# Patient Record
Sex: FEMALE | Race: WHITE | NOT HISPANIC OR LATINO | Employment: OTHER | URBAN - METROPOLITAN AREA
[De-identification: names, ages, dates, MRNs, and addresses within clinical notes are randomized per-mention and may not be internally consistent; named-entity substitution may affect disease eponyms.]

---

## 2021-04-08 DIAGNOSIS — Z23 ENCOUNTER FOR IMMUNIZATION: ICD-10-CM

## 2024-05-15 ENCOUNTER — HOSPITAL ENCOUNTER (INPATIENT)
Dept: RADIOLOGY | Facility: HOSPITAL | Age: 74
LOS: 2 days | Discharge: HOME | DRG: 024 | End: 2024-05-17
Attending: RADIOLOGY | Admitting: EMERGENCY MEDICINE
Payer: COMMERCIAL

## 2024-05-15 ENCOUNTER — DOCUMENTATION (OUTPATIENT)
Dept: OTHER | Facility: HOSPITAL | Age: 74
End: 2024-05-15

## 2024-05-15 ENCOUNTER — APPOINTMENT (OUTPATIENT)
Dept: RADIOLOGY | Facility: HOSPITAL | Age: 74
DRG: 024 | End: 2024-05-15
Payer: COMMERCIAL

## 2024-05-15 ENCOUNTER — ANESTHESIA (OUTPATIENT)
Dept: RADIOLOGY | Facility: HOSPITAL | Age: 74
DRG: 024 | End: 2024-05-15
Payer: COMMERCIAL

## 2024-05-15 ENCOUNTER — HOSPITAL ENCOUNTER (EMERGENCY)
Facility: HOSPITAL | Age: 74
End: 2024-05-15
Attending: STUDENT IN AN ORGANIZED HEALTH CARE EDUCATION/TRAINING PROGRAM
Payer: COMMERCIAL

## 2024-05-15 ENCOUNTER — ANESTHESIA EVENT (OUTPATIENT)
Dept: RADIOLOGY | Facility: HOSPITAL | Age: 74
DRG: 024 | End: 2024-05-15
Payer: COMMERCIAL

## 2024-05-15 ENCOUNTER — APPOINTMENT (EMERGENCY)
Dept: CT IMAGING | Facility: HOSPITAL | Age: 74
End: 2024-05-15
Payer: COMMERCIAL

## 2024-05-15 VITALS
DIASTOLIC BLOOD PRESSURE: 81 MMHG | HEIGHT: 60 IN | SYSTOLIC BLOOD PRESSURE: 162 MMHG | HEART RATE: 83 BPM | RESPIRATION RATE: 18 BRPM | WEIGHT: 152.56 LBS | TEMPERATURE: 98.1 F | BODY MASS INDEX: 29.95 KG/M2 | OXYGEN SATURATION: 95 %

## 2024-05-15 DIAGNOSIS — I63.511 ACUTE ISCHEMIC RIGHT MCA STROKE (HCC): Primary | ICD-10-CM

## 2024-05-15 DIAGNOSIS — R73.03 PREDIABETES: ICD-10-CM

## 2024-05-15 DIAGNOSIS — I63.9 ACUTE CVA (CEREBROVASCULAR ACCIDENT) (HCC): ICD-10-CM

## 2024-05-15 DIAGNOSIS — N13.30 HYDROURETERONEPHROSIS: ICD-10-CM

## 2024-05-15 DIAGNOSIS — I63.511 ACUTE ISCHEMIC RIGHT MCA STROKE (HCC): ICD-10-CM

## 2024-05-15 DIAGNOSIS — R29.810 FACIAL DROOP: Primary | ICD-10-CM

## 2024-05-15 DIAGNOSIS — I63.411 STROKE DUE TO EMBOLISM OF RIGHT MIDDLE CEREBRAL ARTERY (HCC): ICD-10-CM

## 2024-05-15 LAB
ANION GAP SERPL CALCULATED.3IONS-SCNC: 6 MMOL/L (ref 4–13)
APTT PPP: 28 SECONDS (ref 23–37)
BUN SERPL-MCNC: 23 MG/DL (ref 5–25)
CALCIUM SERPL-MCNC: 8.6 MG/DL (ref 8.4–10.2)
CHLORIDE SERPL-SCNC: 104 MMOL/L (ref 96–108)
CHOLEST SERPL-MCNC: 180 MG/DL
CO2 SERPL-SCNC: 27 MMOL/L (ref 21–32)
CREAT SERPL-MCNC: 1.02 MG/DL (ref 0.6–1.3)
ERYTHROCYTE [DISTWIDTH] IN BLOOD BY AUTOMATED COUNT: 12.3 % (ref 11.6–15.1)
EST. AVERAGE GLUCOSE BLD GHB EST-MCNC: 126 MG/DL
GFR SERPL CREATININE-BSD FRML MDRD: 55 ML/MIN/1.73SQ M
GLUCOSE SERPL-MCNC: 99 MG/DL (ref 65–140)
HBA1C MFR BLD: 6 %
HCT VFR BLD AUTO: 38.1 % (ref 34.8–46.1)
HDLC SERPL-MCNC: 45 MG/DL
HGB BLD-MCNC: 12.7 G/DL (ref 11.5–15.4)
INR PPP: 1.17 (ref 0.84–1.19)
LDLC SERPL CALC-MCNC: 99 MG/DL (ref 0–100)
MCH RBC QN AUTO: 30 PG (ref 26.8–34.3)
MCHC RBC AUTO-ENTMCNC: 33.3 G/DL (ref 31.4–37.4)
MCV RBC AUTO: 90 FL (ref 82–98)
NONHDLC SERPL-MCNC: 135 MG/DL
PLATELET # BLD AUTO: 299 THOUSANDS/UL (ref 149–390)
PMV BLD AUTO: 10 FL (ref 8.9–12.7)
POTASSIUM SERPL-SCNC: 3.8 MMOL/L (ref 3.5–5.3)
PROTHROMBIN TIME: 15.2 SECONDS (ref 11.6–14.5)
RBC # BLD AUTO: 4.24 MILLION/UL (ref 3.81–5.12)
SODIUM SERPL-SCNC: 137 MMOL/L (ref 135–147)
TRIGL SERPL-MCNC: 179 MG/DL
WBC # BLD AUTO: 7.34 THOUSAND/UL (ref 4.31–10.16)

## 2024-05-15 PROCEDURE — 82948 REAGENT STRIP/BLOOD GLUCOSE: CPT

## 2024-05-15 PROCEDURE — 93005 ELECTROCARDIOGRAM TRACING: CPT

## 2024-05-15 PROCEDURE — 99285 EMERGENCY DEPT VISIT HI MDM: CPT

## 2024-05-15 PROCEDURE — 76937 US GUIDE VASCULAR ACCESS: CPT

## 2024-05-15 PROCEDURE — C1769 GUIDE WIRE: HCPCS

## 2024-05-15 PROCEDURE — 36223 PLACE CATH CAROTID/INOM ART: CPT

## 2024-05-15 PROCEDURE — 85027 COMPLETE CBC AUTOMATED: CPT | Performed by: STUDENT IN AN ORGANIZED HEALTH CARE EDUCATION/TRAINING PROGRAM

## 2024-05-15 PROCEDURE — 80048 BASIC METABOLIC PNL TOTAL CA: CPT | Performed by: STUDENT IN AN ORGANIZED HEALTH CARE EDUCATION/TRAINING PROGRAM

## 2024-05-15 PROCEDURE — 85610 PROTHROMBIN TIME: CPT | Performed by: STUDENT IN AN ORGANIZED HEALTH CARE EDUCATION/TRAINING PROGRAM

## 2024-05-15 PROCEDURE — C1887 CATHETER, GUIDING: HCPCS

## 2024-05-15 PROCEDURE — 36224 PLACE CATH CAROTD ART: CPT

## 2024-05-15 PROCEDURE — 36215 PLACE CATHETER IN ARTERY: CPT

## 2024-05-15 PROCEDURE — 99291 CRITICAL CARE FIRST HOUR: CPT | Performed by: PSYCHIATRY & NEUROLOGY

## 2024-05-15 PROCEDURE — C1760 CLOSURE DEV, VASC: HCPCS

## 2024-05-15 PROCEDURE — 70498 CT ANGIOGRAPHY NECK: CPT

## 2024-05-15 PROCEDURE — 36415 COLL VENOUS BLD VENIPUNCTURE: CPT | Performed by: STUDENT IN AN ORGANIZED HEALTH CARE EDUCATION/TRAINING PROGRAM

## 2024-05-15 PROCEDURE — 96365 THER/PROPH/DIAG IV INF INIT: CPT

## 2024-05-15 PROCEDURE — 83036 HEMOGLOBIN GLYCOSYLATED A1C: CPT

## 2024-05-15 PROCEDURE — 85730 THROMBOPLASTIN TIME PARTIAL: CPT | Performed by: STUDENT IN AN ORGANIZED HEALTH CARE EDUCATION/TRAINING PROGRAM

## 2024-05-15 PROCEDURE — 80061 LIPID PANEL: CPT

## 2024-05-15 PROCEDURE — C1757 CATH, THROMBECTOMY/EMBOLECT: HCPCS

## 2024-05-15 PROCEDURE — 70551 MRI BRAIN STEM W/O DYE: CPT

## 2024-05-15 PROCEDURE — 70496 CT ANGIOGRAPHY HEAD: CPT

## 2024-05-15 PROCEDURE — 99291 CRITICAL CARE FIRST HOUR: CPT | Performed by: STUDENT IN AN ORGANIZED HEALTH CARE EDUCATION/TRAINING PROGRAM

## 2024-05-15 PROCEDURE — C1894 INTRO/SHEATH, NON-LASER: HCPCS

## 2024-05-15 PROCEDURE — B31RYZZ FLUOROSCOPY OF INTRACRANIAL ARTERIES USING OTHER CONTRAST: ICD-10-PCS | Performed by: RADIOLOGY

## 2024-05-15 PROCEDURE — 03CG3Z7 EXTIRPATION OF MATTER FROM INTRACRANIAL ARTERY USING STENT RETRIEVER, PERCUTANEOUS APPROACH: ICD-10-PCS | Performed by: RADIOLOGY

## 2024-05-15 PROCEDURE — 99223 1ST HOSP IP/OBS HIGH 75: CPT | Performed by: EMERGENCY MEDICINE

## 2024-05-15 RX ORDER — SODIUM CHLORIDE 9 MG/ML
INJECTION, SOLUTION INTRAVENOUS CONTINUOUS PRN
Status: DISCONTINUED | OUTPATIENT
Start: 2024-05-15 | End: 2024-05-15

## 2024-05-15 RX ORDER — HYDRALAZINE HYDROCHLORIDE 20 MG/ML
5 INJECTION INTRAMUSCULAR; INTRAVENOUS EVERY 6 HOURS PRN
Status: DISCONTINUED | OUTPATIENT
Start: 2024-05-15 | End: 2024-05-17 | Stop reason: HOSPADM

## 2024-05-15 RX ORDER — IODIXANOL 320 MG/ML
200 INJECTION, SOLUTION INTRAVASCULAR
Status: COMPLETED | OUTPATIENT
Start: 2024-05-15 | End: 2024-05-15

## 2024-05-15 RX ORDER — SODIUM CHLORIDE 9 MG/ML
1000 INJECTION, SOLUTION INTRAVENOUS CONTINUOUS
Status: DISCONTINUED | OUTPATIENT
Start: 2024-05-15 | End: 2024-05-15 | Stop reason: HOSPADM

## 2024-05-15 RX ORDER — LABETALOL HYDROCHLORIDE 5 MG/ML
10 INJECTION, SOLUTION INTRAVENOUS EVERY 6 HOURS PRN
Status: DISCONTINUED | OUTPATIENT
Start: 2024-05-15 | End: 2024-05-17 | Stop reason: HOSPADM

## 2024-05-15 RX ORDER — ATORVASTATIN CALCIUM 40 MG/1
40 TABLET, FILM COATED ORAL
Status: DISCONTINUED | OUTPATIENT
Start: 2024-05-15 | End: 2024-05-17 | Stop reason: HOSPADM

## 2024-05-15 RX ORDER — FENTANYL CITRATE 50 UG/ML
INJECTION, SOLUTION INTRAMUSCULAR; INTRAVENOUS AS NEEDED
Status: DISCONTINUED | OUTPATIENT
Start: 2024-05-15 | End: 2024-05-15

## 2024-05-15 RX ORDER — CHLORHEXIDINE GLUCONATE ORAL RINSE 1.2 MG/ML
15 SOLUTION DENTAL EVERY 12 HOURS SCHEDULED
Status: DISCONTINUED | OUTPATIENT
Start: 2024-05-15 | End: 2024-05-16

## 2024-05-15 RX ORDER — FENTANYL CITRATE/PF 50 MCG/ML
SYRINGE (ML) INJECTION
Status: DISCONTINUED
Start: 2024-05-15 | End: 2024-05-16 | Stop reason: WASHOUT

## 2024-05-15 RX ORDER — SODIUM CHLORIDE, SODIUM GLUCONATE, SODIUM ACETATE, POTASSIUM CHLORIDE, MAGNESIUM CHLORIDE, SODIUM PHOSPHATE, DIBASIC, AND POTASSIUM PHOSPHATE .53; .5; .37; .037; .03; .012; .00082 G/100ML; G/100ML; G/100ML; G/100ML; G/100ML; G/100ML; G/100ML
1000 INJECTION, SOLUTION INTRAVENOUS ONCE
Status: COMPLETED | OUTPATIENT
Start: 2024-05-15 | End: 2024-05-15

## 2024-05-15 RX ORDER — ASPIRIN 81 MG/1
81 TABLET, CHEWABLE ORAL DAILY
Status: ON HOLD | COMMUNITY
End: 2024-05-17

## 2024-05-15 RX ADMIN — ATORVASTATIN CALCIUM 40 MG: 40 TABLET, FILM COATED ORAL at 20:09

## 2024-05-15 RX ADMIN — FENTANYL CITRATE 25 MCG: 50 INJECTION INTRAMUSCULAR; INTRAVENOUS at 16:36

## 2024-05-15 RX ADMIN — SODIUM CHLORIDE: 0.9 INJECTION, SOLUTION INTRAVENOUS at 15:14

## 2024-05-15 RX ADMIN — FENTANYL CITRATE 25 MCG: 50 INJECTION INTRAMUSCULAR; INTRAVENOUS at 16:31

## 2024-05-15 RX ADMIN — IODIXANOL 55 ML: 320 INJECTION, SOLUTION INTRAVASCULAR at 17:19

## 2024-05-15 RX ADMIN — SODIUM CHLORIDE, SODIUM GLUCONATE, SODIUM ACETATE, POTASSIUM CHLORIDE, MAGNESIUM CHLORIDE, SODIUM PHOSPHATE, DIBASIC, AND POTASSIUM PHOSPHATE 1000 ML: .53; .5; .37; .037; .03; .012; .00082 INJECTION, SOLUTION INTRAVENOUS at 14:56

## 2024-05-15 RX ADMIN — CHLORHEXIDINE GLUCONATE 15 ML: 1.2 SOLUTION ORAL at 21:17

## 2024-05-15 RX ADMIN — SODIUM CHLORIDE 1000 ML/HR: 0.9 INJECTION, SOLUTION INTRAVENOUS at 15:25

## 2024-05-15 RX ADMIN — PHENYLEPHRINE HYDROCHLORIDE 10 MCG/MIN: 10 INJECTION INTRAVENOUS at 16:37

## 2024-05-15 RX ADMIN — LABETALOL HYDROCHLORIDE 10 MG: 5 INJECTION, SOLUTION INTRAVENOUS at 18:39

## 2024-05-15 RX ADMIN — Medication 17 MG: at 15:00

## 2024-05-15 RX ADMIN — FENTANYL CITRATE 25 MCG: 50 INJECTION INTRAMUSCULAR; INTRAVENOUS at 16:39

## 2024-05-15 RX ADMIN — IOHEXOL 85 ML: 350 INJECTION, SOLUTION INTRAVENOUS at 14:42

## 2024-05-15 RX ADMIN — FENTANYL CITRATE 25 MCG: 50 INJECTION INTRAMUSCULAR; INTRAVENOUS at 16:44

## 2024-05-15 NOTE — PLAN OF CARE
Problem: NEUROSENSORY - ADULT  Goal: Achieves stable or improved neurological status  Description: INTERVENTIONS  - Monitor and report changes in neurological status  - Monitor vital signs such as temperature, blood pressure, glucose, and any other labs ordered   - Initiate measures to prevent increased intracranial pressure  - Monitor for seizure activity and implement precautions if appropriate      Outcome: Progressing     Problem: Neurological Deficit  Goal: Neurological status is stable or improving  Description: Interventions:  - Monitor and assess patient's level of consciousness, motor function, sensory function, and level of assistance needed for ADLs.   - Monitor and report changes from baseline. Collaborate with interdisciplinary team to initiate plan and implement interventions as ordered.   - Provide and maintain a safe environment.  - Consider seizure precautions.  - Consider fall precautions.  - Consider aspiration precautions.  - Consider bleeding precautions.  Outcome: Progressing     Problem: METABOLIC, FLUID AND ELECTROLYTES - ADULT  Goal: Electrolytes maintained within normal limits  Description: INTERVENTIONS:  - Monitor labs and assess patient for signs and symptoms of electrolyte imbalances  - Administer electrolyte replacement as ordered  - Monitor response to electrolyte replacements, including repeat lab results as appropriate  - Instruct patient on fluid and nutrition as appropriate  Outcome: Progressing

## 2024-05-15 NOTE — PLAN OF CARE
Problem: Prexisting or High Potential for Compromised Skin Integrity  Goal: Skin integrity is maintained or improved  Description: INTERVENTIONS:  - Identify patients at risk for skin breakdown  - Assess and monitor skin integrity  - Assess and monitor nutrition and hydration status  - Monitor labs   - Assess for incontinence   - Turn and reposition patient  - Assist with mobility/ambulation  - Relieve pressure over bony prominences  - Avoid friction and shearing  - Provide appropriate hygiene as needed including keeping skin clean and dry  - Evaluate need for skin moisturizer/barrier cream  - Collaborate with interdisciplinary team   - Patient/family teaching  - Consider wound care consult   Outcome: Progressing     Problem: NEUROSENSORY - ADULT  Goal: Achieves stable or improved neurological status  Description: INTERVENTIONS  - Monitor and report changes in neurological status  - Monitor vital signs such as temperature, blood pressure, glucose, and any other labs ordered   - Initiate measures to prevent increased intracranial pressure  - Monitor for seizure activity and implement precautions if appropriate      Outcome: Progressing  Goal: Remains free of injury related to seizures activity  Description: INTERVENTIONS  - Maintain airway, patient safety  and administer oxygen as ordered  - Monitor patient for seizure activity, document and report duration and description of seizure to physician/advanced practitioner  - If seizure occurs,  ensure patient safety during seizure  - Reorient patient post seizure  - Seizure pads on all 4 side rails  - Instruct patient/family to notify RN of any seizure activity including if an aura is experienced  - Instruct patient/family to call for assistance with activity based on nursing assessment  - Administer anti-seizure medications if ordered    Outcome: Progressing  Goal: Achieves maximal functionality and self care  Description: INTERVENTIONS  - Monitor swallowing and  airway patency with patient fatigue and changes in neurological status  - Encourage and assist patient to increase activity and self care.   - Encourage visually impaired, hearing impaired and aphasic patients to use assistive/communication devices  Outcome: Progressing     Problem: CARDIOVASCULAR - ADULT  Goal: Maintains optimal cardiac output and hemodynamic stability  Description: INTERVENTIONS:  - Monitor I/O, vital signs and rhythm  - Monitor for S/S and trends of decreased cardiac output  - Administer and titrate ordered vasoactive medications to optimize hemodynamic stability  - Assess quality of pulses, skin color and temperature  - Assess for signs of decreased coronary artery perfusion  - Instruct patient to report change in severity of symptoms  Outcome: Progressing  Goal: Absence of cardiac dysrhythmias or at baseline rhythm  Description: INTERVENTIONS:  - Continuous cardiac monitoring, vital signs, obtain 12 lead EKG if ordered  - Administer antiarrhythmic and heart rate control medications as ordered  - Monitor electrolytes and administer replacement therapy as ordered  Outcome: Progressing     Problem: RESPIRATORY - ADULT  Goal: Achieves optimal ventilation and oxygenation  Description: INTERVENTIONS:  - Assess for changes in respiratory status  - Assess for changes in mentation and behavior  - Position to facilitate oxygenation and minimize respiratory effort  - Oxygen administered by appropriate delivery if ordered  - Initiate smoking cessation education as indicated  - Encourage broncho-pulmonary hygiene including cough, deep breathe, Incentive Spirometry  - Assess the need for suctioning and aspirate as needed  - Assess and instruct to report SOB or any respiratory difficulty  - Respiratory Therapy support as indicated  Outcome: Progressing     Problem: GASTROINTESTINAL - ADULT  Goal: Minimal or absence of nausea and/or vomiting  Description: INTERVENTIONS:  - Administer IV fluids if ordered to  ensure adequate hydration  - Maintain NPO status until nausea and vomiting are resolved  - Nasogastric tube if ordered  - Administer ordered antiemetic medications as needed  - Provide nonpharmacologic comfort measures as appropriate  - Advance diet as tolerated, if ordered  - Consider nutrition services referral to assist patient with adequate nutrition and appropriate food choices  Outcome: Progressing  Goal: Maintains or returns to baseline bowel function  Description: INTERVENTIONS:  - Assess bowel function  - Encourage oral fluids to ensure adequate hydration  - Administer IV fluids if ordered to ensure adequate hydration  - Administer ordered medications as needed  - Encourage mobilization and activity  - Consider nutritional services referral to assist patient with adequate nutrition and appropriate food choices  Outcome: Progressing  Goal: Maintains adequate nutritional intake  Description: INTERVENTIONS:  - Monitor percentage of each meal consumed  - Identify factors contributing to decreased intake, treat as appropriate  - Assist with meals as needed  - Monitor I&O, weight, and lab values if indicated  - Obtain nutrition services referral as needed  Outcome: Progressing  Goal: Establish and maintain optimal ostomy function  Description: INTERVENTIONS:  - Assess bowel function  - Encourage oral fluids to ensure adequate hydration  - Administer IV fluids if ordered to ensure adequate hydration   - Administer ordered medications as needed  - Encourage mobilization and activity  - Nutrition services referral to assist patient with appropriate food choices  - Assess stoma site  - Consider wound care consult   Outcome: Progressing  Goal: Oral mucous membranes remain intact  Description: INTERVENTIONS  - Assess oral mucosa and hygiene practices  - Implement preventative oral hygiene regimen  - Implement oral medicated treatments as ordered  - Initiate Nutrition services referral as needed  Outcome: Progressing      Problem: GENITOURINARY - ADULT  Goal: Maintains or returns to baseline urinary function  Description: INTERVENTIONS:  - Assess urinary function  - Encourage oral fluids to ensure adequate hydration if ordered  - Administer IV fluids as ordered to ensure adequate hydration  - Administer ordered medications as needed  - Offer frequent toileting  - Follow urinary retention protocol if ordered  Outcome: Progressing  Goal: Absence of urinary retention  Description: INTERVENTIONS:  - Assess patient’s ability to void and empty bladder  - Monitor I/O  - Bladder scan as needed  - Discuss with physician/AP medications to alleviate retention as needed  - Discuss catheterization for long term situations as appropriate  Outcome: Progressing  Goal: Urinary catheter remains patent  Description: INTERVENTIONS:  - Assess patency of urinary catheter  - If patient has a chronic fritz, consider changing catheter if non-functioning  - Follow guidelines for intermittent irrigation of non-functioning urinary catheter  Outcome: Progressing     Problem: METABOLIC, FLUID AND ELECTROLYTES - ADULT  Goal: Electrolytes maintained within normal limits  Description: INTERVENTIONS:  - Monitor labs and assess patient for signs and symptoms of electrolyte imbalances  - Administer electrolyte replacement as ordered  - Monitor response to electrolyte replacements, including repeat lab results as appropriate  - Instruct patient on fluid and nutrition as appropriate  Outcome: Progressing  Goal: Fluid balance maintained  Description: INTERVENTIONS:  - Monitor labs   - Monitor I/O and WT  - Instruct patient on fluid and nutrition as appropriate  - Assess for signs & symptoms of volume excess or deficit  Outcome: Progressing  Goal: Glucose maintained within target range  Description: INTERVENTIONS:  - Monitor Blood Glucose as ordered  - Assess for signs and symptoms of hyperglycemia and hypoglycemia  - Administer ordered medications to maintain glucose  within target range  - Assess nutritional intake and initiate nutrition service referral as needed  Outcome: Progressing     Problem: SKIN/TISSUE INTEGRITY - ADULT  Goal: Skin Integrity remains intact(Skin Breakdown Prevention)  Description: Assess:  -Perform Ramon assessment every shift  -Clean and moisturize skin every 4  -Inspect skin when repositioning, toileting, and assisting with ADLS  -Assess under medical devices such as  every   -Assess extremities for adequate circulation and sensation     Bed Management:  -Have minimal linens on bed & keep smooth, unwrinkled  -Change linens as needed when moist or perspiring  -Avoid sitting or lying in one position for more than 2 hours while in bed  -Keep HOB at 30degrees     Toileting:  -Offer bedside commode  -Assess for incontinence every 2  -Use incontinent care products after each incontinent episode such as     Activity:  -Mobilize patient 3 times a day  -Encourage activity and walks on unit  -Encourage or provide ROM exercises   -Turn and reposition patient every 2 Hours  -Use appropriate equipment to lift or move patient in bed  -Instruct/ Assist with weight shifting every 2 when out of bed in chair  -Consider limitation of chair time 4 hour intervals    Skin Care:  -Avoid use of baby powder, tape, friction and shearing, hot water or constrictive clothing  -Relieve pressure over bony prominences using   -Do not massage red bony areas    Next Steps:  -Teach patient strategies to minimize risks such as    -Consider consults to  interdisciplinary teams such as   Outcome: Progressing  Goal: Incision(s), wounds(s) or drain site(s) healing without S/S of infection  Description: INTERVENTIONS  - Assess and document dressing, incision, wound bed, drain sites and surrounding tissue  - Provide patient and family education  - Perform skin care/dressing changes every   Outcome: Progressing  Goal: Pressure injury heals and does not worsen  Description: Interventions:  -  Implement low air loss mattress or specialty surface (Criteria met)  - Apply silicone foam dressing  - Instruct/assist with weight shifting every  minutes when in chair   - Limit chair time to 4 hour intervals  - Use special pressure reducing interventions such as  when in chair   - Apply fecal or urinary incontinence containment device   - Perform passive or active ROM every   - Turn and reposition patient & offload bony prominences every 2 hours   - Utilize friction reducing device or surface for transfers   - Consider consults to  interdisciplinary teams such as   - Use incontinent care products after each incontinent episode such as   - Consider nutrition services referral as needed  Outcome: Progressing     Problem: HEMATOLOGIC - ADULT  Goal: Maintains hematologic stability  Description: INTERVENTIONS  - Assess for signs and symptoms of bleeding or hemorrhage  - Monitor labs  - Administer supportive blood products/factors as ordered and appropriate  Outcome: Progressing     Problem: MUSCULOSKELETAL - ADULT  Goal: Maintain or return mobility to safest level of function  Description: INTERVENTIONS:  - Assess patient's ability to carry out ADLs; assess patient's baseline for ADL function and identify physical deficits which impact ability to perform ADLs (bathing, care of mouth/teeth, toileting, grooming, dressing, etc.)  - Assess/evaluate cause of self-care deficits   - Assess range of motion  - Assess patient's mobility  - Assess patient's need for assistive devices and provide as appropriate  - Encourage maximum independence but intervene and supervise when necessary  - Involve family in performance of ADLs  - Assess for home care needs following discharge   - Consider OT consult to assist with ADL evaluation and planning for discharge  - Provide patient education as appropriate  Outcome: Progressing  Goal: Maintain proper alignment of affected body part  Description: INTERVENTIONS:  - Support, maintain and  protect limb and body alignment  - Provide patient/ family with appropriate education  Outcome: Progressing     Problem: Neurological Deficit  Goal: Neurological status is stable or improving  Description: Interventions:  - Monitor and assess patient's level of consciousness, motor function, sensory function, and level of assistance needed for ADLs.   - Monitor and report changes from baseline. Collaborate with interdisciplinary team to initiate plan and implement interventions as ordered.   - Provide and maintain a safe environment.  - Consider seizure precautions.  - Consider fall precautions.  - Consider aspiration precautions.  - Consider bleeding precautions.  Outcome: Progressing     Problem: Activity Intolerance/Impaired Mobility  Goal: Mobility/activity is maintained at optimum level for patient  Description: Interventions:  - Assess and monitor patient  barriers to mobility and need for assistive/adaptive devices.  - Assess patient's emotional response to limitations.  - Collaborate with interdisciplinary team and initiate plans and interventions as ordered.  - Encourage independent activity per ability.  - Maintain proper body alignment.  - Perform active/passive rom as tolerated/ordered.  - Plan activities to conserve energy.  - Turn patient as appropriate  Outcome: Progressing     Problem: Communication Impairment  Goal: Ability to express needs and understand communication  Description: Assess patient's communication skills and ability to understand information.  Patient will demonstrate use of effective communication techniques, alternative methods of communication and understanding even if not able to speak.     - Encourage communication and provide alternate methods of communication as needed.  - Collaborate with case management/ for discharge needs.  - Include patient/family/caregiver in decisions related to communication.  Outcome: Progressing     Problem: Potential for  Aspiration  Goal: Non-ventilated patient's risk of aspiration is minimized  Description: Assess and monitor vital signs, respiratory status, and labs (WBC).  Monitor for signs of aspiration (tachypnea, cough, rales, wheezing, cyanosis, fever).    - Assess and monitor patient's ability to swallow.  - Place patient up in chair to eat if possible.  - HOB up at 90 degrees to eat if unable to get patient up into chair.  - Supervise patient during oral intake.   - Instruct patient/ family to take small bites.  - Instruct patient/ family to take small single sips when taking liquids.  - Follow patient-specific strategies generated by speech pathologist.  Outcome: Progressing  Goal: Ventilated patient's risk of aspiration is minimized  Description: Assess and monitor vital signs, respiratory status, airway cuff pressure, and labs (WBC).  Monitor for signs of aspiration (tachypnea, cough, rales, wheezing, cyanosis, fever).    - Elevate head of bed 30 degrees if patient has tube feeding.  - Monitor tube feeding.  Outcome: Progressing     Problem: Nutrition  Goal: Nutrition/Hydration status is improving  Description: Monitor and assess patient's nutrition/hydration status for malnutrition (ex- brittle hair, bruises, dry skin, pale skin and conjunctiva, muscle wasting, smooth red tongue, and disorientation). Collaborate with interdisciplinary team and initiate plan and interventions as ordered.  Monitor patient's weight and dietary intake as ordered or per policy. Utilize nutrition screening tool and intervene per policy. Determine patient's food preferences and provide high-protein, high-caloric foods as appropriate.     - Assist patient with eating.  - Allow adequate time for meals.  - Encourage patient to take dietary supplement as ordered.  - Collaborate with clinical nutritionist.  - Include patient/family/caregiver in decisions related to nutrition.  Outcome: Progressing

## 2024-05-15 NOTE — BRIEF OP NOTE (RAD/CATH)
IR STROKE ALERT Procedure Note    PATIENT NAME: Radha Dodson  : 1950  MRN: [id number]    Pre-op Diagnosis:   1. Stroke due to embolism of right middle cerebral artery (HCC)      Post-op Diagnosis:   1. Stroke due to embolism of right middle cerebral artery (HCC)        Surgeon:   Manjit An MD  Assistants:     No qualified resident was available, Resident is only observing    Estimated Blood Loss: 50 cc  Findings:   Distal right MCA M1 occlusion, TICI 0.  Successful mechanical thrombectomy, TICI 3    Right femoral sheath removed, closure device deployed.    Specimens: None    Complications: None    Anesthesia: MAC sedation    Manjit An MD     Date: 5/15/2024  Time: 5:14 PM

## 2024-05-15 NOTE — ED PROVIDER NOTES
History  Chief Complaint   Patient presents with    STROKE Alert     Sudden onset of slurred speech, left facial droop, left arm weakness while eating lunch at 1340.       History provided by:  Patient and EMS personnel    73-year-old female.  Presents with left-sided facial droop. Patient states that she was eating lunch approximately 50 minutes PTA, when she developed left facial droop, LUE weakness. Denies head injury. EMS found the patient to have left sided facial droop and flaccid left upper extremity. Bilateral lower extremity strength. Normal blood glucose. Stroke alert called.     Prior to Admission Medications   Prescriptions Last Dose Informant Patient Reported? Taking?   aspirin 81 mg chewable tablet 5/15/2024  Yes Yes   Sig: Chew 81 mg daily      Facility-Administered Medications: None       History reviewed. No pertinent past medical history.    History reviewed. No pertinent surgical history.    History reviewed. No pertinent family history.  I have reviewed and agree with the history as documented.    E-Cigarette/Vaping    E-Cigarette Use Never User      E-Cigarette/Vaping Substances     Social History     Tobacco Use    Smoking status: Never    Smokeless tobacco: Never   Vaping Use    Vaping status: Never Used   Substance Use Topics    Alcohol use: Not Currently    Drug use: Never     Review of Systems   Unable to perform ROS: Acuity of condition   Neurological:  Positive for facial asymmetry and weakness. Negative for dizziness, speech difficulty, light-headedness, numbness and headaches.   Hematological:  Does not bruise/bleed easily.   Psychiatric/Behavioral:  Negative for confusion and decreased concentration.      Physical Exam  Physical Exam  Vitals and nursing note reviewed.   Constitutional:       General: She is not in acute distress.     Appearance: She is not ill-appearing or toxic-appearing.   HENT:      Head: Normocephalic and atraumatic.      Right Ear: External ear normal.      Left  Ear: External ear normal.   Eyes:      General: No scleral icterus.        Right eye: No discharge.         Left eye: No discharge.      Extraocular Movements: Extraocular movements intact.      Conjunctiva/sclera: Conjunctivae normal.   Cardiovascular:      Rate and Rhythm: Normal rate and regular rhythm.      Pulses: Normal pulses.      Heart sounds: Normal heart sounds. No murmur heard.  Pulmonary:      Effort: Pulmonary effort is normal. No respiratory distress.      Breath sounds: Normal breath sounds. No stridor. No wheezing, rhonchi or rales.   Chest:      Chest wall: No tenderness.   Abdominal:      General: Bowel sounds are normal.      Palpations: Abdomen is soft.      Tenderness: There is no abdominal tenderness. There is no guarding or rebound.   Musculoskeletal:      Right lower leg: No edema.      Left lower leg: No edema.   Skin:     General: Skin is warm and dry.   Neurological:      Mental Status: She is alert and oriented to person, place, and time.      GCS: GCS eye subscore is 4. GCS verbal subscore is 5. GCS motor subscore is 6.      Cranial Nerves: Cranial nerve deficit and facial asymmetry present. No dysarthria.      Motor: No weakness or pronator drift.      Coordination: Coordination normal.   Psychiatric:         Mood and Affect: Mood normal.         Behavior: Behavior normal.         Vital Signs  ED Triage Vitals   Temperature Pulse Respirations Blood Pressure SpO2   05/15/24 1440 05/15/24 1440 05/15/24 1440 05/15/24 1440 05/15/24 1440   98.1 °F (36.7 °C) 93 16 135/97 97 %      Temp Source Heart Rate Source Patient Position - Orthostatic VS BP Location FiO2 (%)   05/15/24 1440 05/15/24 1440 05/15/24 1440 05/15/24 1515 --   Temporal Monitor Lying Right arm       Pain Score       --                  Vitals:    05/15/24 1451 05/15/24 1500 05/15/24 1515 05/15/24 1530   BP: (!) 171/74 163/76 (!) 172/82 162/81   Pulse: 84 83 85 83   Patient Position - Orthostatic VS:  Lying Lying Lying          Visual Acuity  Visual Acuity      Flowsheet Row Most Recent Value   L Pupil Size (mm) 2   R Pupil Size (mm) 2            ED Medications  Medications   sodium chloride 0.9 % infusion (1,000 mL/hr Intravenous New Bag 5/15/24 1525)   iohexol (OMNIPAQUE) 350 MG/ML injection (MULTI-DOSE) 85 mL (85 mL Intravenous Given 5/15/24 1442)   tenecteplase (TNKase) injection 17 mg (17 mg Intravenous Given 5/15/24 1500)   multi-electrolyte (ISOLYTE-S PH 7.4) bolus 1,000 mL (0 mL Intravenous Stopped 5/15/24 1521)       Diagnostic Studies  Results Reviewed       Procedure Component Value Units Date/Time    Basic metabolic panel [[id number]] Collected: 05/15/24 1455    Lab Status: Final result Specimen: Blood from Arm, Left Updated: 05/15/24 1528     Sodium 137 mmol/L      Potassium 3.8 mmol/L      Chloride 104 mmol/L      CO2 27 mmol/L      ANION GAP 6 mmol/L      BUN 23 mg/dL      Creatinine 1.02 mg/dL      Glucose 99 mg/dL      Calcium 8.6 mg/dL      eGFR 55 ml/min/1.73sq m     Narrative:      National Kid[employer] Foundation guidelines for Chronic Kidney Disease (CKD):     Stage 1 with normal or high GFR (GFR > 90 mL/min/1.73 square meters)    Stage 2 Mild CKD (GFR = 60-89 mL/min/1.73 square meters)    Stage 3A Moderate CKD (GFR = 45-59 mL/min/1.73 square meters)    Stage 3B Moderate CKD (GFR = 30-44 mL/min/1.73 square meters)    Stage 4 Severe CKD (GFR = 15-29 mL/min/1.73 square meters)    Stage 5 End Stage CKD (GFR <15 mL/min/1.73 square meters)  Note: GFR calculation is accurate only with a steady state creatinine    Protime-INR [[id number]]  (Abnormal) Collected: 05/15/24 1455    Lab Status: Final result Specimen: Blood from Arm, Left Updated: 05/15/24 1524     Protime 15.2 seconds      INR 1.17    APTT [[id number]]  (Normal) Collected: 05/15/24 1455    Lab Status: Final result Specimen: Blood from Arm, Left Updated: 05/15/24 1524     PTT 28 seconds     CBC and Platelet [[id number]]  (Normal) Collected: 05/15/24 0674     Lab Status: Final result Specimen: Blood from Arm, Left Updated: 05/15/24 1507     WBC 7.34 Thousand/uL      RBC 4.24 Million/uL      Hemoglobin 12.7 g/dL      Hematocrit 38.1 %      MCV 90 fL      MCH 30.0 pg      MCHC 33.3 g/dL      RDW 12.3 %      Platelets 299 Thousands/uL      MPV 10.0 fL                    CT stroke alert brain   Final Result by Jose C Wong MD (05/15 0302)      No acute intracranial abnormality.      Mild chronic microangiopathy.      Findings were directly discussed with Aram Bautista at approximately 2:48 PM.      Workstation performed: [id number]         CTA stroke alert (head/neck)   Final Result by Jose C Wong MD (05/15 1500)      Occlusive thrombus in right MCA distal M1 bifurcation extending into proximal M2 superior and inferior divisions. Contrast opacification is noted beyond this occluded segment likely due to good collaterals.      Severe stenosis in right PCA P2 segment.      Additional chronic/incidental findings as detailed above.      Findings were directly discussed with Aram Bautista at approximately 2:48 PM.                        Workstation performed: [id number]         CT head wo contrast    (Results Pending)          Procedures  ECG 12 Lead Documentation Only    Date/Time: 5/15/2024 2:50 PM    Performed by: Gaston Zeng DO  Authorized by: Gaston Zeng DO    Indications / Diagnosis:  Stroke alert  Patient location:  ED  Previous ECG:     Previous ECG:  Unavailable  Interpretation:     Interpretation: non-specific    Rate:     ECG rate:  86    ECG rate assessment: normal    Rhythm:     Rhythm: sinus rhythm    QRS:     QRS axis:  Normal    QRS intervals:  Normal  Conduction:     Conduction: abnormal      Abnormal conduction comment:  Short AR interval  ST segments:     ST segments:  Normal  T waves:     T waves: normal    CriticalCare Time    Date/Time: 5/15/2024 4:03 PM    Performed by: Gaston Zeng DO  Authorized by: Gaston Zeng DO     Critical care provider statement:     Critical care time (minutes):  40    Critical care time was exclusive of:  Separately billable procedures and treating other patients    Critical care was necessary to treat or prevent imminent or life-threatening deterioration of the following conditions:  CNS failure or compromise    Critical care was time spent personally by me on the following activities:  Blood draw for specimens, obtaining history from patient or surrogate, development of treatment plan with patient or surrogate, discussions with consultants, evaluation of patient's response to treatment, examination of patient, review of old charts, re-evaluation of patient's condition, ordering and review of radiographic studies, ordering and review of laboratory studies and ordering and performing treatments and interventions    ED Course  ED Course as of 05/15/24 1603   Wed May 15, 2024   1439 Vital signs reviewed.  Stroke alert called.  On exam, the patient is awake/alert/oriented with left-sided facial droop.  No signs of upper extremity weakness.  No pronator drift.  Neurology, Dr. Bautista, contacted.   1455 Following CAT scan, the patient appears less alert.  The patient continues to have left-sided facial droop and now has left upper extremity pronator drift.  Neurology contacted.  Will administer TNK.  Neurology is contacting interventional radiology.    1456 Per neurology, the patient has a right M1 occlusion distally with patent vessels beyond that point.  Recommendations include TNK, raising BP to 160-180 ideally, keeping the head of bed completely flat.    1504 CTA imaging +Occlusive thrombus in right MCA distal M1 bifurcation extending into proximal M2 superior and inferior divisions. Contrast opacification is noted beyond this occluded segment likely due to good collaterals.     Severe stenosis in right PCA P2 segment.     1517 Patient is s/p TNK. Last /76. Patient signed EMTALA paperwork. [facility] is  enroute. ETA 20 minutes.      Medical Decision Making  This patient presents with left sided facial droop.   Diagnostic considerations include TIA, ischemic CVA, hemorrhagic CVA, Bell's palsy, intracranial malignancy. See ED Course.         Problems Addressed:  Acute CVA (cerebrovascular accident) (HCC): acute illness or injury  Acute ischemic right MCA stroke (HCC): acute illness or injury  Facial droop: acute illness or injury    Amount and/or Complexity of Data Reviewed  Labs: ordered. Decision-making details documented in ED Course.  Radiology: ordered. Decision-making details documented in ED Course.  ECG/medicine tests: ordered and independent interpretation performed. Decision-making details documented in ED Course.  Discussion of management or test interpretation with external provider(s): The case and treatment plan were discussed with neurology    Risk  Prescription drug management.  Decision regarding hospitalization.  Emergency major surgery.             Disposition  Final diagnoses:   Facial droop   Acute CVA (cerebrovascular accident) (HCC)   Acute ischemic right MCA stroke (HCC)     Time reflects when diagnosis was documented in both MDM as applicable and the Disposition within this note       Time User Action Codes Description Comment    5/15/2024  2:39 PM Gaston Zeng Add [R29.810] Facial droop     5/15/2024  3:03 PM Gaston Zeng Add [I63.9] Acute CVA (cerebrovascular accident) (HCC)     5/15/2024  3:03 PM Gaston Zeng Add [I63.511] Acute ischemic right MCA stroke (HCC)           ED Disposition       ED Disposition   Transfer to Another Facility-In Network    Condition   --    Date/Time   Wed May 15, 2024  3:04 PM    Comment   Radha Dodson should be transferred out to [facility].               MD Documentation      Flowsheet Row Most Recent Value   Patient Condition The patient has been stabilized such that within reasonable medical probability, no material deterioration of the patient condition  or the condition of the unborn child(abhijit) is likely to result from the transfer   Reason for Transfer Level of Care needed not available at this facility   Benefits of Transfer Specialized equipment and/or services available at the receiving facility (Include comment)________________________   Risks of Transfer Potential for delay in receiving treatment   Accepting Physician Lily   Accepting Facility Name, [facility]   Sending MD Zeng          RN Documentation      Flowsheet Row Most Recent Value   Accepting Facility Name, [facility]   Transport Mode Helicopter   Level of Care CCT-Nurse          Follow-up Information    None         Discharge Medication List as of 5/15/2024  3:49 PM        CONTINUE these medications which have NOT CHANGED    Details   aspirin 81 mg chewable tablet Chew 81 mg daily, Historical Med             No discharge procedures on file.    PDMP Review       None            ED Provider  Electronically Signed by             Gaston Zeng DO  05/15/24 7879

## 2024-05-15 NOTE — QUICK NOTE
Radha Cote is a 73-year-old female with no known significant past medical history who presented as a stroke alert with left facial droop and left upper extremity weakness which occurred at 1:30 PM today suddenly while she was having lunch.  According to the H&P, EMS reported that patient initially had flaccid paralysis of the left upper extremity however this had resolved by the time she presented to the ED.  NIHSS 2 in the ED. CTH negative. CTA with distal R M1 occlusion w/ distal flow in M2 branches; severe stenosis of R P2.   Post-CT patient worsened with AMS, LUE and LLE weakness, and thus TNK was administered and patient was transferred to [facility] for evaluation for potential mechanical thrombectomy.    On arrival to [facility], patient went directly to IR. NIHSS as follows:    NIHSS:  1a.Level of Consciousness: 0 = Alert   1b. LOC Questions: 0 = Answers both correctly   1c. LOC Commands: 0 = Obeys both correctly   2. Best Gaze: 0 = Normal   3. Visual: 0 = No visual field loss   4. Facial Palsy: 1=Minor paralysis (flattened nasolabial fold, asymmetric on smiling)   5a. Motor Right Arm: 0=No drift, limb holds 90 (or 45) degrees for full 10 seconds   5b. Motor Left Arm: 0=No drift, limb holds 90 (or 45) degrees for full 10 seconds   6a. Motor Right Le=No drift, limb holds 90 (or 45) degrees for full 10 seconds   6b. Motor Left Le=No drift, limb holds 90 (or 45) degrees for full 10 seconds   7. Limb Ataxia:  0=Absent   8. Sensory: 0=Normal; no sensory loss   9. Best Language:  0=No aphasia, normal   10. Dysarthria: 0=Normal articulation   11. Extinction and Inattention (formerly Neglect): 1=Visual, tactile, auditory, spatial or personal inattention or extinction to bilateral simultaneous stimulation in one of the sensory modalities   Total Score: 2      Additionally, patient noted to have minimal L sided weakness, though no drift.

## 2024-05-15 NOTE — CONSULTS
Consultation - Neurosurgery   Radha Dodson 73 y.o. female MRN: [id number]  Unit/Bed#: ICU OVR Encounter: [id number]      Assessment & Plan     Assessment:  Right MCA occlusion    Plan:  Patient with improved symptoms however not at baseline.  I recommended cerebral angiography with mechanical thrombectomy.  She understands the risks and has elected proceed.    History of Present Illness     HPI: Radha Dodson is a 73 y.o. year old female who presents with acute right MCA syndrome. Last known normal was 1:30 PM.   NIH has been fluctuating.  CTA with distal right MCA M1 occlusion.  She received TNK.    Consults    Review of Systems   Unable to perform ROS: Acuity of condition       Historical Information   No past medical history on file.  No past surgical history on file.  Social History     Substance and Sexual Activity   Alcohol Use Not Currently     Social History     Substance and Sexual Activity   Drug Use Never     E-Cigarette/Vaping    E-Cigarette Use Never User      E-Cigarette/Vaping Substances     Social History     Tobacco Use   Smoking Status Never   Smokeless Tobacco Never     No family history on file.    Meds/Allergies   all current active meds have been reviewed  No Known Allergies    Objective     Intake/Output Summary (Last 24 hours) at 5/15/2024 1703  Last data filed at 5/15/2024 1655  Gross per 24 hour   Intake --   Output 250 ml   Net -250 ml       Physical Exam  Constitutional:       Appearance: Normal appearance.   Eyes:      Pupils: Pupils are equal, round, and reactive to light.   Cardiovascular:      Rate and Rhythm: Normal rate.      Pulses: Normal pulses.   Pulmonary:      Effort: Pulmonary effort is normal.   Musculoskeletal:         General: Normal range of motion.   Skin:     General: Skin is warm.   Neurological:      Mental Status: She is alert.      Cranial Nerves: Cranial nerve deficit present.      Sensory: Sensory deficit present.      Motor: Weakness present.      Comments: Mild  left facial droop  Left side with very subtle weakness relative to the right but no drift.  Mild neglect and  Positive extension.   Psychiatric:         Mood and Affect: Mood normal.         Behavior: Behavior normal.         Thought Content: Thought content normal.         Judgment: Judgment normal.       Neurologic Exam     Cranial Nerves     CN III, IV, VI   Pupils are equal, round, and reactive to light.      Vitals:There were no vitals taken for this visit.,There is no height or weight on file to calculate BMI.     Lab Results: I have personally reviewed pertinent results.      Imaging Studies: I have personally reviewed pertinent reports.      EKG, Pathology, and Other Studies: I have personally reviewed pertinent reports.      VTE Prophylaxis: Sequential compression device (Venodyne)     Code Status: No Order  Advance Directive and Living Will:      Power of :    POLST:      Counseling / Coordination of Care  None

## 2024-05-15 NOTE — STROKE DOCUMENTATION
Patient to be transferred to [facility] IR via [facility]. Accepting provider Dr. An, number for report [phone]. ETA of transportation [phone].

## 2024-05-15 NOTE — ED NOTES
Report given to [facility]. Pt transported to [facility] via [facility]. No s/s of distress, VS stable, A&Ox4, ID band in place     Nancy Graham RN  05/15/24 1825

## 2024-05-15 NOTE — DISCHARGE INSTRUCTIONS
AFTER YOU LEAVE:     Self-care:   Keep your wound clean and dry.  Remove band aid/ dressing tomorrow. You may shower 24 hours after your procedure. Shower and wash groin area or wrist area gently with soap and water: beginning tomorrow. Rinse and pat Dry. Apply new water seal band aid. Repeat this process for 5 days.  If there is any drainage from the puncture site, you should put on a clean bandage. No Powders, creams, lotions or antibiotic ointments for 5 days.  No tub baths, hot tubs or swimming for 5 days.    Watch for bleeding and bruising: It is normal to have a bruise and soreness where the angiogram catheter went in.  Diet:   You may resume your regular diet, Sips of flat soda will help with mild nausea.  Drink more liquids than usual for the next 24 hours        IMMEDIATELY Contact Interventional Radiology at [phone] if any of the following occur:  If your bruise gets larger or if you notice any active bleeding. APPLY DIRECT PRESSURE TO THE BLEEDING SITE.   If you notice increased swelling or have increased pain at the puncture site   If you have any numbness or pain in the extremity of the puncture site   If that extremity seems cold or pale.    You have fever greater than 101  Persistent nausea or vomitting    Follow up with your primary healthcare provider  as directed: Write down your questions so you remember to ask them during your visits.

## 2024-05-15 NOTE — H&P
[facility]  H&P: Critical Care  Name: Radha Dodson 73 y.o. female I MRN: [id number]  Unit/Bed#: ICU 14 I Date of Admission: 5/15/2024   Date of Service: 5/15/2024 I Hospital Day: 0      Assessment & Plan   Neuro:     Diagnosis: Acute R MCA CVA s/p thrombectomy 5/15  NIH 2  TNK 5/15 1500  CTA-Occlusive thrombus in right MCA distal M1 bifurcation extending into proximal M2 superior and inferior divisions. Contrast opacification is noted beyond this occluded segment likely due to good collaterals. Severe stenosis in right PCA P2 segment.    MRI P  Repeat CTH 24h post TNK  A1C P  /179/45/135  ECHO P  Plan:   Neuro/Neurosx consulted  Stroke Pathway  Neuro checks stat CTH for any change GCS> 2 ps  Neurovasc checks/groin per protocol  Statin  ASA/DVT ppx once cleared by neurosx  -140    CV:   No active issues    Pulm:  No active issues    GI:   No active issues    :   No active issues    F/E/N:    No active issues    Heme/Onc:   No active issues    Endo:   No active issues    ID:   No active issues    MSK/Skin:   No active issues    Disposition: Critical care       History of Present Illness     HPI: Radha Dodson is a 73 y.o. with no PMH (does not see physicians) who presents with L facial droop/UE weakness to [facility]. Stroke alert initiated. R MCA occulsion noted, TNK given, Endovascular alert. Now s/p thrombectomy with no neuro deficits.    History obtained from sibling, chart review, and the patient.  Review of Systems   Constitutional: Negative.    Respiratory: Negative.     Cardiovascular: Negative.    Gastrointestinal: Negative.    Genitourinary: Negative.    Musculoskeletal: Negative.    Neurological: Negative.    All other systems reviewed and are negative.     Historical Information   No past medical history on file. No past surgical history on file.   Current Outpatient Medications   Medication Instructions    aspirin 81 mg, Oral, Daily    No Known Allergies    Social History     Tobacco Use    Smoking status: Never    Smokeless tobacco: Never   Vaping Use    Vaping status: Never Used   Substance Use Topics    Alcohol use: Not Currently    Drug use: Never    No family history on file.       Objective                            Vitals I/O      Most Recent Min/Max in 24hrs   Temp 98.5 °F (36.9 °C) Temp  Min: 98.1 °F (36.7 °C)  Max: 98.5 °F (36.9 °C)   Pulse 70 Pulse  Min: 70  Max: 93   Resp 18 Resp  Min: 12  Max: 18   /78 BP  Min: 125/68  Max: 172/82   O2 Sat 95 % SpO2  Min: 94 %  Max: 98 %      Intake/Output Summary (Last 24 hours) at 5/15/2024 1857  Last data filed at 5/15/2024 1721  Gross per 24 hour   Intake 800 ml   Output 250 ml   Net 550 ml       Diet Regular; Regular House    Invasive Monitoring           Physical Exam   Physical Exam  Eyes:      Pupils: Pupils are equal, round, and reactive to light.   Skin:     General: Skin is warm and dry.   HENT:      Head: Normocephalic and atraumatic.      Mouth/Throat:      Mouth: Mucous membranes are moist.   Cardiovascular:      Rate and Rhythm: Normal rate and regular rhythm.      Pulses: Normal pulses.      Heart sounds: Normal heart sounds.   Musculoskeletal:         General: No swelling. Normal range of motion.      Right lower leg: No edema.      Left lower leg: No edema.   Abdominal: General: Bowel sounds are normal. There is no distension.      Palpations: Abdomen is soft.      Tenderness: There is no abdominal tenderness.   Constitutional:       General: She is not in acute distress.  Pulmonary:      Effort: Pulmonary effort is normal. No respiratory distress.      Breath sounds: Normal breath sounds.   Neurological:      General: No focal deficit present.      Mental Status: She is alert and oriented to person, place and time.      GCS: GCS eye subscore is 4. GCS verbal subscore is 5. GCS motor subscore is 6.      Motor: Strength full and intact in all extremities. No motor deficit.            Diagnostic  Studies      EKG: tel  Imaging:  I have personally reviewed pertinent reports.   and I have personally reviewed pertinent films in PACS     Medications:  Scheduled PRN   chlorhexidine, 15 mL, Q12H CAMPOS      hydrALAZINE, 5 mg, Q6H PRN  labetalol, 10 mg, Q6H PRN       Continuous          Labs:    CBC    Recent Labs     05/15/24  1455   WBC 7.34   HGB 12.7   HCT 38.1        BMP    Recent Labs     05/15/24  1455   SODIUM 137   K 3.8      CO2 27   AGAP 6   BUN 23   CREATININE 1.02   CALCIUM 8.6       Coags    Recent Labs     05/15/24  1455   INR 1.17   PTT 28        Additional Electrolytes  No recent results       Blood Gas    No recent results  No recent results LFTs  No recent results    Infectious  No recent results  Glucose  Recent Labs     05/15/24  1455   GLUC 99               ERAN River

## 2024-05-15 NOTE — ANESTHESIA POSTPROCEDURE EVALUATION
Post-Op Assessment Note    CV Status:  Stable    Pain management: adequate       Mental Status:  Alert and awake   Hydration Status:  Stable   PONV Controlled:  Controlled   Airway Patency:  Patent     Post Op Vitals Reviewed: Yes    No anethesia notable event occurred.    Staff: CRNA   Comments: pt transported on monitor, VSS, awake and talking through out transport            BP   151/54   Temp      Pulse  80   Resp   18   SpO2   94 room air

## 2024-05-15 NOTE — TELEMEDICINE
TeleConsultation - Stroke   Radha Dodson 73 y.o. female MRN: [id number]  Unit/Bed#: ED 01 Encounter: [id number]      VIRTUAL CARE DOCUMENTATION:     1. This service was provided via Telemedicine using [employer]     2. Parties in the room with patient during teleconsult Patient only    3. Confidentiality My office door was closed     4. Participants No one else was in the room    5. Patient acknowledged consent and understanding of privacy and security of the  Telemedicine consult. I informed the patient that I have reviewed their record in Epic and presented the opportunity for them to ask any questions regarding the visit today.  The patient agreed to participate.    6. Time spent 45 minutes.       Assessment & Plan   Assessment:   72 y/o F with no known PMHx that presents with L facial droop and fluctuating L hemiparesis, now again improved s/p TNK administration, in the setting of R distal M1 occlusion w/ distal flow and severe R P2 stenosis.    TPA Decision: After a discussion of risks, benefits and alternatives reviewing inclusion and exclusion criteria the decision was made to proceed with thrombolytic therapy. Specifically discussed were the potential benefits, risks, and side effects of the proposed stroke intervention(s) and care; the likelihood of the patient achieving their goals; and any potential problems that might occur as a result of the intervention(s); reasonable alternatives to the proposed stroke intervention(s) and care. The discussion encompasses risks, benefits and side effects related to the alternatives and the risks related to not receiving the proposed stroke intervention(s) and care. Verbal consent was obtained from the patient..  Consent was obtained byGaston Zeng DO.    Plan:   -continue neurochecks; notify with changes  -admit to ICU at \[facility]\""  -HCT reviewed - no evidence of acute ischemia/hemorrhage  -CTA reviewed - R distal M1 stenosis w/ distal flow; severe R P2  stenosis  -obtain MRI brain w/o contrast  -obtain TTE  -telemetry  -24 hr CT - this can be deferred if MRI is done around this time  -if 24 hr scan is unremarkable can initiate ASA and DVT ppx  -initiate Atorvastatin 40mg daily  -BP goal <180/105 post-TNK  -maintain HOB flat; provide fluids with SBP goal ideally 160-180 unless thrombectomy is performed and successful  -rest of care as per NCC/Neuro IR at [facility]    Recommendations for outpatient neurological follow up have yet to be determined.      Reason for Consult / Principal Problem: stroke alert  Hx and PE limited by: N/A  Patient last known well: 1:30pm 5/15  Stroke alert called: 2:32pm, prehospital  Neurology time of arrival: immediate, by phone    HPI: Radha Dodson is a 73 y.o. female without known significant PMHx who presents with onset of L facial droop and LUE weakness. LKN 1:30pm while she was having lunch and suddenly had deficits develop. EMS reported initially had flaccid paralysis of the LUE but by the time of ED arrival had full strength with only L facial droop as residual. NIHSS reported as 2 with no other deficit noted initially. Went for HCT and CTA, which were personally reviewed - no evidence of acute ischemia/hemorrhage; distal R M1 occlusion w/ distal flow in M2 branches; severe stenosis of R P2.    Post-CT, patient had worsened clinically, now with altered mental status and return of considerable weakness in the LUE and LLE. As such, decision was made to proceed with TNK administration. There were no known contraindications. TNK was administered at 3pm. Discussed case with Neuro IR (Dr. An) and pt is to be transferred to [facility] for potential mechanical thrombectomy and monitoring.    Post-TNK pt was evaluated on tele and now again had residual L facial droop but no other deficits noted; NIHSS 2.    Consult to Neurology  Consult performed by: Aram Bautista DO  Consult ordered by: Gaston Zeng DO        Review of Systems   Neurological:   Positive for facial asymmetry and weakness.   All other systems reviewed and are negative.      Historical Information   No past medical history on file.  No past surgical history on file.  Social History   Social History     Substance and Sexual Activity   Alcohol Use Not on file     Social History     Substance and Sexual Activity   Drug Use Not on file     E-Cigarette/Vaping     E-Cigarette/Vaping Substances     Social History     Tobacco Use   Smoking Status Not on file   Smokeless Tobacco Not on file     Family History: History reviewed. No pertinent family history.    Review of previous medical records was completed.    Meds/Allergies   all current active meds have been reviewed, current meds:   Current Facility-Administered Medications   Medication Dose Route Frequency    multi-electrolyte (ISOLYTE-S PH 7.4) bolus 1,000 mL  1,000 mL Intravenous Once   , and PTA meds:   Prior to Admission Medications   Prescriptions Last Dose Informant Patient Reported? Taking?   aspirin 81 mg chewable tablet 5/15/2024  Yes Yes   Sig: Chew 81 mg daily      Facility-Administered Medications: None       Not on File    Objective   Vitals:There were no vitals taken for this visit.,There is no height or weight on file to calculate BMI.  No intake or output data in the 24 hours ending 05/15/24 1444    Invasive Devices:   Invasive Devices       Peripheral Intravenous Line  Duration             Peripheral IV 05/15/24 Dorsal (posterior);Right Hand <1 day    Peripheral IV 05/15/24 Left Antecubital <1 day                    Physical Exam  Constitutional:       Appearance: She is well-developed.   HENT:      Head: Normocephalic and atraumatic.   Eyes:      Extraocular Movements: EOM normal.      Conjunctiva/sclera: Conjunctivae normal.   Pulmonary:      Effort: No respiratory distress.   Abdominal:      General: There is no distension.   Musculoskeletal:         General: No swelling.      Cervical back: No rigidity.   Skin:      Coloration: Skin is not jaundiced.   Neurological:      Mental Status: She is alert and oriented to person, place, and time.      Motor: Motor strength is normal.     Coordination: Finger-Nose-Finger Test and Heel to Shin Test normal.   Psychiatric:         Speech: Speech normal.       Neurologic Exam     Mental Status   Oriented to person, place, and time.   Attention: normal. Concentration: normal.   Speech: speech is normal   Level of consciousness: alert  Knowledge: good.   Able to name object. Able to repeat. Normal comprehension.     Cranial Nerves     CN II   Visual fields full to confrontation.     CN III, IV, VI   Extraocular motions are normal.     CN V   Facial sensation intact.     CN VIII   Hearing: intact    CN XII   Tongue deviation: none  L lower facial droop     Motor Exam     Strength   Strength 5/5 throughout.     Sensory Exam   Light touch normal.     Gait, Coordination, and Reflexes     Coordination   Finger to nose coordination: normal  Heel to shin coordination: normal      NIHSS:  1a.Level of Consciousness: 0 = Alert   1b. LOC Questions: 0 = Answers both correctly   1c. LOC Commands: 0 = Obeys both correctly   2. Best Gaze: 0 = Normal   3. Visual: 0 = No visual field loss   4. Facial Palsy: 2=Partial paralysis (total or near total paralysis of the lower face)   5a. Motor Right Arm: 0=No drift, limb holds 90 (or 45) degrees for full 10 seconds   5b. Motor Left Arm: 0=No drift, limb holds 90 (or 45) degrees for full 10 seconds   6a. Motor Right Le=No drift, limb holds 90 (or 45) degrees for full 10 seconds   6b. Motor Left Le=No drift, limb holds 90 (or 45) degrees for full 10 seconds   7. Limb Ataxia:  0=Absent   8. Sensory: 0=Normal; no sensory loss   9. Best Language:  0=No aphasia, normal   10. Dysarthria: 0=Normal articulation   11. Extinction and Inattention (formerly Neglect): 0=No abnormality   Total Score: 2     Time NIHSS was completed: 3:10pm 5/15    Modified [place] Score:  0  "(No baseline symptoms/disability)    Lab Results: I have personally reviewed pertinent reports.  , CBC:   Results from last 7 days   Lab Units 05/15/24  1455   WBC Thousand/uL 7.34   RBC Million/uL 4.24   HEMOGLOBIN g/dL 12.7   HEMATOCRIT % 38.1   MCV fL 90   PLATELETS Thousands/uL 299   , BMP/CMP:       Invalid input(s): \"ALBUMIN\", HgBA1C:   , Coagulation:   , Lipid Profile:     Imaging Studies: I have personally reviewed pertinent films in PACS with a Radiologist.  EKG, Pathology, and Other Studies: I have personally reviewed pertinent reports.    VTE Prophylaxis: Sequential compression device (Venodyne)     Counseling / Coordination of Care  Total Critical Care time spent 45 minutes excluding procedures, teaching and family updates.      "

## 2024-05-15 NOTE — EMTALA/ACUTE CARE TRANSFER
[facility] EMERGENCY DEPARTMENT  [address] [facility] [address]  Dept: [phone]      EMTALA TRANSFER CONSENT    NAME Radha Dodson                                         1950                              MRN [id number]    I have been informed of my rights regarding examination, treatment, and transfer   by Dr. Gaston Zeng DO    Benefits: Specialized equipment and/or services available at the receiving facility (Include comment)________________________    Risks: Potential for delay in receiving treatment      Consent for Transfer:  I acknowledge that my medical condition has been evaluated and explained to me by the emergency department physician or other qualified medical person and/or my attending physician, who has recommended that I be transferred to the service of  Accepting Physician: Lily at Accepting Facility Name, City & State : [facility]. The above potential benefits of such transfer, the potential risks associated with such transfer, and the probable risks of not being transferred have been explained to me, and I fully understand them.  The doctor has explained that, in my case, the benefits of transfer outweigh the risks.  I agree to be transferred.    I authorize the performance of emergency medical procedures and treatments upon me in both transit and upon arrival at the receiving facility.  Additionally, I authorize the release of any and all medical records to the receiving facility and request they be transported with me, if possible.  I understand that the safest mode of transportation during a medical emergency is an ambulance and that the Hospital advocates the use of this mode of transport. Risks of traveling to the receiving facility by car, including absence of medical control, life sustaining equipment, such as oxygen, and medical personnel has been explained to me and I fully understand them.    (FLAVIA CORRECT BOX BELOW)  [  ]  I consent  to the stated transfer and to be transported by ambulance/helicopter.  [  ]  I consent to the stated transfer, but refuse transportation by ambulance and accept full responsibility for my transportation by car.  I understand the risks of non-ambulance transfers and I exonerate the Hospital and its staff from any deterioration in my condition that results from this refusal.    X___________________________________________    DATE  05/15/24  TIME________  Signature of patient or legally responsible individual signing on patient behalf           RELATIONSHIP TO PATIENT_________________________          Provider Certification    NAME Radha Dodson                                         1950                              MRN [id number]    A medical screening exam was performed on the above named patient.  Based on the examination:    Condition Necessitating Transfer The primary encounter diagnosis was Facial droop. Diagnoses of Acute CVA (cerebrovascular accident) (HCC) and Acute ischemic right MCA stroke (HCC) were also pertinent to this visit.    Patient Condition: The patient has been stabilized such that within reasonable medical probability, no material deterioration of the patient condition or the condition of the unborn child(abhijit) is likely to result from the transfer    Reason for Transfer: Level of Care needed not available at this facility    Transfer Requirements: Facility [facility]   Space available and qualified personnel available for treatment as acknowledged by    Agreed to accept transfer and to provide appropriate medical treatment as acknowledged by       Lily  Appropriate medical records of the examination and treatment of the patient are provided at the time of transfer   STAFF INITIAL WHEN COMPLETED _______  Transfer will be performed by qualified personnel from    and appropriate transfer equipment as required, including the use of necessary and appropriate life support  measures.    Provider Certification: I have examined the patient and explained the following risks and benefits of being transferred/refusing transfer to the patient/family:         Based on these reasonable risks and benefits to the patient and/or the unborn child(abhijit), and based upon the information available at the time of the patient’s examination, I certify that the medical benefits reasonably to be expected from the provision of appropriate medical treatments at another medical facility outweigh the increasing risks, if any, to the individual’s medical condition, and in the case of labor to the unborn child, from effecting the transfer.    X____________________________________________ DATE 05/15/24        TIME_______      ORIGINAL - SEND TO MEDICAL RECORDS   COPY - SEND WITH PATIENT DURING TRANSFER

## 2024-05-16 ENCOUNTER — APPOINTMENT (INPATIENT)
Dept: RADIOLOGY | Facility: HOSPITAL | Age: 74
DRG: 024 | End: 2024-05-16
Payer: COMMERCIAL

## 2024-05-16 ENCOUNTER — APPOINTMENT (INPATIENT)
Dept: NON INVASIVE DIAGNOSTICS | Facility: HOSPITAL | Age: 74
DRG: 024 | End: 2024-05-16
Payer: COMMERCIAL

## 2024-05-16 PROBLEM — Z86.73 H/O ISCHEMIC RIGHT MCA STROKE: Status: ACTIVE | Noted: 2024-05-16

## 2024-05-16 PROBLEM — I63.511 ACUTE ISCHEMIC RIGHT MCA STROKE (HCC): Status: ACTIVE | Noted: 2024-05-16

## 2024-05-16 LAB
ALBUMIN SERPL BCP-MCNC: 3.4 G/DL (ref 3.5–5)
ALP SERPL-CCNC: 59 U/L (ref 34–104)
ALT SERPL W P-5'-P-CCNC: 17 U/L (ref 7–52)
ANION GAP SERPL CALCULATED.3IONS-SCNC: 9 MMOL/L (ref 4–13)
AORTIC ROOT: 2.4 CM
APICAL FOUR CHAMBER EJECTION FRACTION: 55 %
AST SERPL W P-5'-P-CCNC: 27 U/L (ref 13–39)
ATRIAL RATE: 86 BPM
AV PEAK GRADIENT: 6 MMHG
BASOPHILS # BLD AUTO: 0.04 THOUSANDS/ÂΜL (ref 0–0.1)
BASOPHILS NFR BLD AUTO: 1 % (ref 0–1)
BILIRUB SERPL-MCNC: 0.65 MG/DL (ref 0.2–1)
BSA FOR ECHO PROCEDURE: 1.63 M2
BUN SERPL-MCNC: 22 MG/DL (ref 5–25)
CALCIUM ALBUM COR SERPL-MCNC: 9 MG/DL (ref 8.3–10.1)
CALCIUM SERPL-MCNC: 8.5 MG/DL (ref 8.4–10.2)
CHLORIDE SERPL-SCNC: 105 MMOL/L (ref 96–108)
CO2 SERPL-SCNC: 25 MMOL/L (ref 21–32)
CREAT SERPL-MCNC: 0.96 MG/DL (ref 0.6–1.3)
E WAVE DECELERATION TIME: 173 MS
E/A RATIO: 1.37
EOSINOPHIL # BLD AUTO: 0.2 THOUSAND/ÂΜL (ref 0–0.61)
EOSINOPHIL NFR BLD AUTO: 3 % (ref 0–6)
ERYTHROCYTE [DISTWIDTH] IN BLOOD BY AUTOMATED COUNT: 12.5 % (ref 11.6–15.1)
FRACTIONAL SHORTENING: 29 (ref 28–44)
GFR SERPL CREATININE-BSD FRML MDRD: 59 ML/MIN/1.73SQ M
GLUCOSE SERPL-MCNC: 93 MG/DL (ref 65–140)
HCT VFR BLD AUTO: 35.2 % (ref 34.8–46.1)
HGB BLD-MCNC: 11.7 G/DL (ref 11.5–15.4)
IMM GRANULOCYTES # BLD AUTO: 0.01 THOUSAND/UL (ref 0–0.2)
IMM GRANULOCYTES NFR BLD AUTO: 0 % (ref 0–2)
INTERVENTRICULAR SEPTUM IN DIASTOLE (PARASTERNAL SHORT AXIS VIEW): 1.2 CM
INTERVENTRICULAR SEPTUM: 1.2 CM (ref 0.6–1.1)
LAAS-AP2: 17.8 CM2
LAAS-AP4: 16.3 CM2
LEFT ATRIUM SIZE: 4 CM
LEFT ATRIUM VOLUME (MOD BIPLANE): 43 ML
LEFT ATRIUM VOLUME INDEX (MOD BIPLANE): 26.2 ML/M2
LEFT INTERNAL DIMENSION IN SYSTOLE: 3.5 CM (ref 2.1–4)
LEFT VENTRICULAR INTERNAL DIMENSION IN DIASTOLE: 4.9 CM (ref 3.5–6)
LEFT VENTRICULAR POSTERIOR WALL IN END DIASTOLE: 1.1 CM
LEFT VENTRICULAR STROKE VOLUME: 61 ML
LVSV (TEICH): 61 ML
LYMPHOCYTES # BLD AUTO: 2.35 THOUSANDS/ÂΜL (ref 0.6–4.47)
LYMPHOCYTES NFR BLD AUTO: 31 % (ref 14–44)
MAGNESIUM SERPL-MCNC: 2 MG/DL (ref 1.9–2.7)
MCH RBC QN AUTO: 30.4 PG (ref 26.8–34.3)
MCHC RBC AUTO-ENTMCNC: 33.2 G/DL (ref 31.4–37.4)
MCV RBC AUTO: 91 FL (ref 82–98)
MONOCYTES # BLD AUTO: 0.99 THOUSAND/ÂΜL (ref 0.17–1.22)
MONOCYTES NFR BLD AUTO: 13 % (ref 4–12)
MV E'TISSUE VEL-LAT: 12 CM/S
MV E'TISSUE VEL-SEP: 8 CM/S
MV PEAK A VEL: 0.46 M/S
MV PEAK E VEL: 63 CM/S
MV STENOSIS PRESSURE HALF TIME: 50 MS
MV VALVE AREA P 1/2 METHOD: 4.4
NEUTROPHILS # BLD AUTO: 4 THOUSANDS/ÂΜL (ref 1.85–7.62)
NEUTS SEG NFR BLD AUTO: 52 % (ref 43–75)
NRBC BLD AUTO-RTO: 0 /100 WBCS
P AXIS: 56 DEGREES
PHOSPHATE SERPL-MCNC: 3.8 MG/DL (ref 2.3–4.1)
PLATELET # BLD AUTO: 272 THOUSANDS/UL (ref 149–390)
PMV BLD AUTO: 10.5 FL (ref 8.9–12.7)
POTASSIUM SERPL-SCNC: 4.9 MMOL/L (ref 3.5–5.3)
PR INTERVAL: 110 MS
PROT SERPL-MCNC: 6.3 G/DL (ref 6.4–8.4)
QRS AXIS: 1 DEGREES
QRSD INTERVAL: 68 MS
QT INTERVAL: 424 MS
QTC INTERVAL: 507 MS
RBC # BLD AUTO: 3.85 MILLION/UL (ref 3.81–5.12)
RIGHT ATRIAL 2D VOLUME: 24 ML
RIGHT ATRIUM AREA SYSTOLE A4C: 11.6 CM2
RIGHT VENTRICLE ID DIMENSION: 2.8 CM
SL CV LEFT ATRIUM LENGTH A2C: 5.3 CM
SL CV LV EF: 55
SL CV PED ECHO LEFT VENTRICLE DIASTOLIC VOLUME (MOD BIPLANE) 2D: 111 ML
SL CV PED ECHO LEFT VENTRICLE SYSTOLIC VOLUME (MOD BIPLANE) 2D: 49 ML
SODIUM SERPL-SCNC: 139 MMOL/L (ref 135–147)
T WAVE AXIS: -66 DEGREES
TRICUSPID ANNULAR PLANE SYSTOLIC EXCURSION: 2 CM
VENTRICULAR RATE: 86 BPM
WBC # BLD AUTO: 7.59 THOUSAND/UL (ref 4.31–10.16)

## 2024-05-16 PROCEDURE — 80053 COMPREHEN METABOLIC PANEL: CPT

## 2024-05-16 PROCEDURE — 93306 TTE W/DOPPLER COMPLETE: CPT | Performed by: INTERNAL MEDICINE

## 2024-05-16 PROCEDURE — 99232 SBSQ HOSP IP/OBS MODERATE 35: CPT | Performed by: NURSE PRACTITIONER

## 2024-05-16 PROCEDURE — 85025 COMPLETE CBC W/AUTO DIFF WBC: CPT

## 2024-05-16 PROCEDURE — NC001 PR NO CHARGE: Performed by: RADIOLOGY

## 2024-05-16 PROCEDURE — 84100 ASSAY OF PHOSPHORUS: CPT

## 2024-05-16 PROCEDURE — 70450 CT HEAD/BRAIN W/O DYE: CPT

## 2024-05-16 PROCEDURE — 99232 SBSQ HOSP IP/OBS MODERATE 35: CPT | Performed by: INTERNAL MEDICINE

## 2024-05-16 PROCEDURE — 74177 CT ABD & PELVIS W/CONTRAST: CPT

## 2024-05-16 PROCEDURE — 83735 ASSAY OF MAGNESIUM: CPT

## 2024-05-16 PROCEDURE — 97166 OT EVAL MOD COMPLEX 45 MIN: CPT

## 2024-05-16 PROCEDURE — NC001 PR NO CHARGE: Performed by: INTERNAL MEDICINE

## 2024-05-16 PROCEDURE — 99232 SBSQ HOSP IP/OBS MODERATE 35: CPT | Performed by: PSYCHIATRY & NEUROLOGY

## 2024-05-16 PROCEDURE — C8929 TTE W OR WO FOL WCON,DOPPLER: HCPCS

## 2024-05-16 PROCEDURE — 97163 PT EVAL HIGH COMPLEX 45 MIN: CPT

## 2024-05-16 PROCEDURE — 71260 CT THORAX DX C+: CPT

## 2024-05-16 RX ORDER — ASPIRIN 81 MG/1
81 TABLET, CHEWABLE ORAL DAILY
Status: DISCONTINUED | OUTPATIENT
Start: 2024-05-16 | End: 2024-05-17 | Stop reason: HOSPADM

## 2024-05-16 RX ORDER — MULTIVITAMIN
1 TABLET ORAL DAILY
COMMUNITY

## 2024-05-16 RX ADMIN — ATORVASTATIN CALCIUM 40 MG: 40 TABLET, FILM COATED ORAL at 16:19

## 2024-05-16 RX ADMIN — ASPIRIN 81 MG CHEWABLE TABLET 81 MG: 81 TABLET CHEWABLE at 20:52

## 2024-05-16 RX ADMIN — CHLORHEXIDINE GLUCONATE 15 ML: 1.2 SOLUTION ORAL at 09:15

## 2024-05-16 RX ADMIN — LABETALOL HYDROCHLORIDE 10 MG: 5 INJECTION, SOLUTION INTRAVENOUS at 14:32

## 2024-05-16 RX ADMIN — IOHEXOL 100 ML: 350 INJECTION, SOLUTION INTRAVENOUS at 19:53

## 2024-05-16 RX ADMIN — PERFLUTREN 0.6 ML/MIN: 6.52 INJECTION, SUSPENSION INTRAVENOUS at 09:00

## 2024-05-16 NOTE — ASSESSMENT & PLAN NOTE
PPD 1 right MCA thrombectomy, TICI 3  Presented with right MCA syndrome (L facial droop and LUE weakness).  NIH fluctuating.  CTA demonstrated distal right MCA M1 occlusion.  She did receive TNK.  Patient reported taking aspirin 81 mg daily at home.    Imaging:    MRI brain wo 5/15/24:Very small acute infarct in the right frontal lobe. No acute hemorrhage.Mild chronic microangiopathy    Plan:  Continue frequent neurological checks  STAT CT head with any neurological decline including drop in GCS of 2 points within 1 hour  Plan to repeat CT head today 24 hours post TNK  Reviewed imaging with patient  Neurology following, input appreciated  -140, defer further management to neurology  AP/AC medication per neurology  PT/OT/speech therapy following  Mobilize patient as tolerated  Medical management and pain control per primary team  DVT PPX: SCDs    Neurosurgery will sign off, further stroke management per neurology, call with any further questions or concerns.

## 2024-05-16 NOTE — ASSESSMENT & PLAN NOTE
73-year-old female who has no known PMH, but does not routinely see doctors, who presented as a stroke alert with left facial droop and left upper extremity weakness on 5/15 which with sudden symptom onset at 13:30 while she was having lunch. EMS reported that patient initially had flaccid paralysis of the left upper extremity; however, this had resolved by the time she presented to the ED. NIHSS 2 in the ED. CTH negative. CTA with distal R M1 occlusion w/ distal flow in M2 branches; severe stenosis of R P2.   Post-CT patient worsened with AMS, and LUE/LLE weakness, and thus TNK was administered. The patient was then transferred to [facility] for intervention and is now s/p successful TICI 3 mechanical thrombectomy.    Work-up:  CTH:   No acute intracranial abnormality. Mild chronic microangiopathy.  CTA H/N:   Occlusive thrombus in right MCA distal M1 bifurcation extending into proximal M2 superior and inferior divisions. Contrast opacification is noted beyond this occluded segment likely due to good collaterals. Severe stenosis in right PCA P2 segment.  Additional chronic/incidental findings as detailed above.  MRI brain:   Very small acute infarct in the right frontal lobe. No acute hemorrhage. Mild chronic microangiopathy  A1C 6.0  LDL 99    MRI with very small residual R frontal lobe infarct s/p TNK and successful thrombectomy with neuro exam unremarkable aside from minimal L proximal weakness which patient reports may have been present prior to stroke. Concern for central embolic etiology. Will likely need long term cardiac monitoring. Plan as detailed below.    Plan:  Was on TNK protocol - TNK given 5/15 1500  Repeat CTH 24 hours post TNK was negative  DAPT for 21 days  Continue ASA 81 mg daily indefinitely  Load Plavix and continue 75 mg daily for 21 days  Atorvastatin 40mg   Echo completed, unremarkable  Outpatient Cardiology follow-up with Zio patch vs Loop recorder  Monitored on Telemetry  Secondary stroke risk  factor modification  Goal normotension  Goal of euglycemia and normothermia  PT/OT/ST  Stroke Education  Cardiology follow up outpatient for zio patch/loop recorder  Neurology outpatient follow up  Internal medicine outpatient follow up for establishing care

## 2024-05-16 NOTE — ANESTHESIA PREPROCEDURE EVALUATION
Procedure:  IR STROKE ALERT    Relevant Problems   No relevant active problems   Stroke symptoms     Physical Exam    Airway    Mallampati score: II         Dental   No notable dental hx     Cardiovascular      Pulmonary      Other Findings  post-pubertal.      Anesthesia Plan  ASA Score- 3 Emergent    Anesthesia Type- IV sedation with anesthesia with ASA Monitors.         Additional Monitors:     Airway Plan:     Comment: I, Dr. Fuller, the attending physician, have personally seen and evaluated the patient prior to anesthetic care.  I have reviewed the pre-anesthetic record, and other medical records if appropriate to the anesthetic care.  If a CRNA is involved in the case, I have reviewed the CRNA assessment, if present, and agree.  The patient is in a suitable condition to proceed with my formulated anesthetic plan.  .       Plan Factors-    Chart reviewed.                      Induction- intravenous.    Postoperative Plan-     Perioperative Resuscitation Plan - Level 1 - Full Code.       Informed Consent-   I personally reviewed this patient with the CRNA. Discussed and agreed on the Anesthesia Plan with the CRNA..

## 2024-05-16 NOTE — PROGRESS NOTES
Progress Note - Neurology   Radha Dodson 73 y.o. female [id number]  Unit/Bed#: ICU 14/ICU 14    Assessment/Plan:  Acute ischemic right MCA stroke (HCC)  Assessment & Plan  73-year-old female who has no known PMH, but does not routinely see doctors, who presented as a stroke alert with left facial droop and left upper extremity weakness on 5/15 which with sudden symptom onset at 13:30 while she was having lunch. EMS reported that patient initially had flaccid paralysis of the left upper extremity; however, this had resolved by the time she presented to the ED. NIHSS 2 in the ED. CTH negative. CTA with distal R M1 occlusion w/ distal flow in M2 branches; severe stenosis of R P2.   Post-CT patient worsened with AMS, and LUE/LLE weakness, and thus TNK was administered. The patient was then transferred [facility] and is now s/p successful TICI 3 mechanical thrombectomy.    Work-up:  CTH:   No acute intracranial abnormality. Mild chronic microangiopathy.  CTA H/N:   Occlusive thrombus in right MCA distal M1 bifurcation extending into proximal M2 superior and inferior divisions. Contrast opacification is noted beyond this occluded segment likely due to good collaterals. Severe stenosis in right PCA P2 segment.  Additional chronic/incidental findings as detailed above.  MRI brain:   Very small acute infarct in the right frontal lobe. No acute hemorrhage. Mild chronic microangiopathy  A1C 6.0  LDL 99    MRI with very small residual R frontal lobe infarct s/p TNK and successful thrombectomy with neuro exam unremarkable aside from minimal L proximal weakness which patient reports may have been present prior to stroke. Concern for central embolic etiology. Awaiting Echo read. Will likely need long term cardiac monitoring. Plan as detailed below.    Plan:  Acute ischemic stroke protocol  TNK protocol - TNK given 5/15 1500  Repeat CTH 24 hours post TNK as per protocol  Hold AC/AP until repeat CTH completed and read  as stable, then start Aspirin 81mg  Atorvastatin 40mg   Echo completed, read pending  Pending result will likely recommend outpatient Cardiology follow-up with Zio patch vs Loop recorder  Telemetry  Secondary stroke risk factor modification  Permissive HTN with gradual return to goal of normotension 24-28 hours post symptom onset  Goal of euglycemia and normothermia  PT/OT/ST  Stroke Education  Frequent neuro checks  Stat CT head with worsening neuro exam  Notify neurology with any changes in exam       Radha Dodson will need follow up in in 4 weeks with neurovascular attending or advance practitioner. She will not require outpatient neurological testing.    Subjective:   Patient reports she is doing well and states she is at her baseline. She denies any new neurologic deficits.    No past medical history on file.  No past surgical history on file.  No family history on file.  Social History     Socioeconomic History    Marital status: Single     Spouse name: Not on file    Number of children: Not on file    Years of education: Not on file    Highest education level: Not on file   Occupational History    Not on file   Tobacco Use    Smoking status: Never    Smokeless tobacco: Never   Vaping Use    Vaping status: Never Used   Substance and Sexual Activity    Alcohol use: Not Currently    Drug use: Never    Sexual activity: Not on file   Other Topics Concern    Not on file   Social History Narrative    Not on file     Social Determinants of Health     Financial Resource Strain: Not on file   Food Insecurity: Not on file   Transportation Needs: Not on file   Physical Activity: Not on file   Stress: Not on file   Social Connections: Not on file   Intimate Partner Violence: Not on file   Housing Stability: Not on file       Medications: Reviewed in detail by me.    ROS: Review of Systems A 12 point ROS was completed and aside from some questionable proximal LLE weakness all remaining systems were negative.       Vitals:  /70 (BP Location: Right arm)   Pulse 72   Temp 98.3 °F (36.8 °C) (Oral)   Resp 22   Ht 5' (1.524 m)   Wt 66.2 kg (146 lb)   SpO2 98%   BMI 28.51 kg/m²     Physical Exam:   Physical Exam  Constitutional:       General: She is not in acute distress.     Appearance: Normal appearance. She is well-developed. She is not ill-appearing or toxic-appearing.   HENT:      Head: Normocephalic and atraumatic.   Eyes:      Extraocular Movements: Extraocular movements intact and EOM normal.      Conjunctiva/sclera: Conjunctivae normal.   Cardiovascular:      Rate and Rhythm: Normal rate and regular rhythm.   Pulmonary:      Effort: Pulmonary effort is normal. No respiratory distress.      Breath sounds: No stridor.   Musculoskeletal:         General: No tenderness or deformity. Normal range of motion.      Cervical back: Normal range of motion and neck supple.   Skin:     General: Skin is warm and dry.   Neurological:      Mental Status: She is alert and oriented to person, place, and time.      Coordination: Finger-Nose-Finger Test and Heel to Shin Test normal.   Psychiatric:         Speech: Speech normal.       Neurologic Exam     Mental Status   Oriented to person, place, and time.   Follows 2 step commands.   Attention: normal. Concentration: normal.   Speech: speech is normal (No dysarthria or aphasia)  Level of consciousness: alert  Knowledge: good.   Normal comprehension.     Cranial Nerves     CN II   Visual acuity: normal (grossly)  Right visual field deficit: none  Left visual field deficit: none     CN III, IV, VI   Extraocular motions are normal.   Nystagmus: none   Conjugate gaze: present    CN V   Facial sensation intact.     CN VII   Facial expression full, symmetric.     CN VIII   Hearing: intact    CN XII   Tongue deviation: none    Motor Exam   Muscle bulk: normal  Overall muscle tone: normal  Right arm pronator drift: absent  Left arm pronator drift: absent    Strength   Strength 5/5 except as  noted.   Proximal LLE 5-     Sensory Exam   Light touch normal.     Gait, Coordination, and Reflexes     Coordination   Finger to nose coordination: normal  Heel to shin coordination: normal      Labs: Reviewed in detail by me.     Imaging: I have personally reviewed pertinent imaging and PACS reports.     VTE Prophylaxis: Sequential compression device (Venodyne)     Total time spent today 35 minutes. Greater than 50% of total time was spent with the patient and / or family counseling and / or coordination of care. A description of the counseling / coordination of care: discussing presenting symptoms, stroke pathophysiology, suspected etiology, pending work-up and overall plan of care with patient and critical care team.

## 2024-05-16 NOTE — PROGRESS NOTES
Critical Care Interval Transfer Note:    Please refer to progress note from earlier today for full details.     Barriers to discharge:   Complete stroke work-up     Consults: IP CONSULT TO CASE MANAGEMENT  IP CONSULT TO NEUROLOGY  IP CONSULT TO NEUROSURGERY    Recommended to review admission imaging for incidental findings and document in discharge navigator: Chart reviewed, no known incidental findings noted at this time.      Discharge Plan: TBD    Patient seen and evaluated by Critical Care today and deemed to be appropriate for transfer to [facility] with Telemetry. Spoke to Senior Resident Libertad Hoffman from Neurology to accept transfer. Critical care can be contacted via [facility] with any questions or concerns.

## 2024-05-16 NOTE — PLAN OF CARE
Problem: Prexisting or High Potential for Compromised Skin Integrity  Goal: Skin integrity is maintained or improved  Description: INTERVENTIONS:  - Identify patients at risk for skin breakdown  - Assess and monitor skin integrity  - Assess and monitor nutrition and hydration status  - Monitor labs   - Assess for incontinence   - Turn and reposition patient  - Assist with mobility/ambulation  - Relieve pressure over bony prominences  - Avoid friction and shearing  - Provide appropriate hygiene as needed including keeping skin clean and dry  - Evaluate need for skin moisturizer/barrier cream  - Collaborate with interdisciplinary team   - Patient/family teaching  - Consider wound care consult   Outcome: Progressing     Problem: NEUROSENSORY - ADULT  Goal: Achieves stable or improved neurological status  Description: INTERVENTIONS  - Monitor and report changes in neurological status  - Monitor vital signs such as temperature, blood pressure, glucose, and any other labs ordered   - Initiate measures to prevent increased intracranial pressure  - Monitor for seizure activity and implement precautions if appropriate      Outcome: Progressing  Goal: Remains free of injury related to seizures activity  Description: INTERVENTIONS  - Maintain airway, patient safety  and administer oxygen as ordered  - Monitor patient for seizure activity, document and report duration and description of seizure to physician/advanced practitioner  - If seizure occurs,  ensure patient safety during seizure  - Reorient patient post seizure  - Seizure pads on all 4 side rails  - Instruct patient/family to notify RN of any seizure activity including if an aura is experienced  - Instruct patient/family to call for assistance with activity based on nursing assessment  - Administer anti-seizure medications if ordered    Outcome: Progressing  Goal: Achieves maximal functionality and self care  Description: INTERVENTIONS  - Monitor swallowing and  airway patency with patient fatigue and changes in neurological status  - Encourage and assist patient to increase activity and self care.   - Encourage visually impaired, hearing impaired and aphasic patients to use assistive/communication devices  Outcome: Progressing     Problem: CARDIOVASCULAR - ADULT  Goal: Maintains optimal cardiac output and hemodynamic stability  Description: INTERVENTIONS:  - Monitor I/O, vital signs and rhythm  - Monitor for S/S and trends of decreased cardiac output  - Administer and titrate ordered vasoactive medications to optimize hemodynamic stability  - Assess quality of pulses, skin color and temperature  - Assess for signs of decreased coronary artery perfusion  - Instruct patient to report change in severity of symptoms  Outcome: Progressing  Goal: Absence of cardiac dysrhythmias or at baseline rhythm  Description: INTERVENTIONS:  - Continuous cardiac monitoring, vital signs, obtain 12 lead EKG if ordered  - Administer antiarrhythmic and heart rate control medications as ordered  - Monitor electrolytes and administer replacement therapy as ordered  Outcome: Progressing     Problem: RESPIRATORY - ADULT  Goal: Achieves optimal ventilation and oxygenation  Description: INTERVENTIONS:  - Assess for changes in respiratory status  - Assess for changes in mentation and behavior  - Position to facilitate oxygenation and minimize respiratory effort  - Oxygen administered by appropriate delivery if ordered  - Initiate smoking cessation education as indicated  - Encourage broncho-pulmonary hygiene including cough, deep breathe, Incentive Spirometry  - Assess the need for suctioning and aspirate as needed  - Assess and instruct to report SOB or any respiratory difficulty  - Respiratory Therapy support as indicated  Outcome: Progressing     Problem: GASTROINTESTINAL - ADULT  Goal: Minimal or absence of nausea and/or vomiting  Description: INTERVENTIONS:  - Administer IV fluids if ordered to  ensure adequate hydration  - Maintain NPO status until nausea and vomiting are resolved  - Nasogastric tube if ordered  - Administer ordered antiemetic medications as needed  - Provide nonpharmacologic comfort measures as appropriate  - Advance diet as tolerated, if ordered  - Consider nutrition services referral to assist patient with adequate nutrition and appropriate food choices  Outcome: Progressing  Goal: Maintains or returns to baseline bowel function  Description: INTERVENTIONS:  - Assess bowel function  - Encourage oral fluids to ensure adequate hydration  - Administer IV fluids if ordered to ensure adequate hydration  - Administer ordered medications as needed  - Encourage mobilization and activity  - Consider nutritional services referral to assist patient with adequate nutrition and appropriate food choices  Outcome: Progressing  Goal: Maintains adequate nutritional intake  Description: INTERVENTIONS:  - Monitor percentage of each meal consumed  - Identify factors contributing to decreased intake, treat as appropriate  - Assist with meals as needed  - Monitor I&O, weight, and lab values if indicated  - Obtain nutrition services referral as needed  Outcome: Progressing  Goal: Establish and maintain optimal ostomy function  Description: INTERVENTIONS:  - Assess bowel function  - Encourage oral fluids to ensure adequate hydration  - Administer IV fluids if ordered to ensure adequate hydration   - Administer ordered medications as needed  - Encourage mobilization and activity  - Nutrition services referral to assist patient with appropriate food choices  - Assess stoma site  - Consider wound care consult   Outcome: Progressing  Goal: Oral mucous membranes remain intact  Description: INTERVENTIONS  - Assess oral mucosa and hygiene practices  - Implement preventative oral hygiene regimen  - Implement oral medicated treatments as ordered  - Initiate Nutrition services referral as needed  Outcome: Progressing      Problem: GENITOURINARY - ADULT  Goal: Maintains or returns to baseline urinary function  Description: INTERVENTIONS:  - Assess urinary function  - Encourage oral fluids to ensure adequate hydration if ordered  - Administer IV fluids as ordered to ensure adequate hydration  - Administer ordered medications as needed  - Offer frequent toileting  - Follow urinary retention protocol if ordered  Outcome: Progressing  Goal: Absence of urinary retention  Description: INTERVENTIONS:  - Assess patient’s ability to void and empty bladder  - Monitor I/O  - Bladder scan as needed  - Discuss with physician/AP medications to alleviate retention as needed  - Discuss catheterization for long term situations as appropriate  Outcome: Progressing  Goal: Urinary catheter remains patent  Description: INTERVENTIONS:  - Assess patency of urinary catheter  - If patient has a chronic fritz, consider changing catheter if non-functioning  - Follow guidelines for intermittent irrigation of non-functioning urinary catheter  Outcome: Progressing     Problem: METABOLIC, FLUID AND ELECTROLYTES - ADULT  Goal: Electrolytes maintained within normal limits  Description: INTERVENTIONS:  - Monitor labs and assess patient for signs and symptoms of electrolyte imbalances  - Administer electrolyte replacement as ordered  - Monitor response to electrolyte replacements, including repeat lab results as appropriate  - Instruct patient on fluid and nutrition as appropriate  Outcome: Progressing  Goal: Fluid balance maintained  Description: INTERVENTIONS:  - Monitor labs   - Monitor I/O and WT  - Instruct patient on fluid and nutrition as appropriate  - Assess for signs & symptoms of volume excess or deficit  Outcome: Progressing  Goal: Glucose maintained within target range  Description: INTERVENTIONS:  - Monitor Blood Glucose as ordered  - Assess for signs and symptoms of hyperglycemia and hypoglycemia  - Administer ordered medications to maintain glucose  within target range  - Assess nutritional intake and initiate nutrition service referral as needed  Outcome: Progressing     Problem: SKIN/TISSUE INTEGRITY - ADULT  Goal: Skin Integrity remains intact(Skin Breakdown Prevention)  Description: Assess:  -Perform Ramon assessment every shift  -Clean and moisturize skin every shift  -Inspect skin when repositioning, toileting, and assisting with ADLS  -Assess under medical devices such as bracelets every shift  -Assess extremities for adequate circulation and sensation     Bed Management:  -Have minimal linens on bed & keep smooth, unwrinkled  -Change linens as needed when moist or perspiring  -Avoid sitting or lying in one position for more than 2 hours while in bed  -Keep HOB at 30 degrees     Toileting:  -Offer bedside commode  -Assess for incontinence every shift  -Use incontinent care products after each incontinent episode such as foaming skin cleanser    Activity:  -Mobilize patient 3 times a day  -Encourage activity and walks on unit  -Encourage or provide ROM exercises   -Turn and reposition patient every 2 Hours  -Use appropriate equipment to lift or move patient in bed  -Instruct/ Assist with weight shifting every 2 hours when out of bed in chair  -Consider limitation of chair time 4 hour intervals    Skin Care:  -Avoid use of baby powder, tape, friction and shearing, hot water or constrictive clothing  -Relieve pressure over bony prominences using allevyns  -Do not massage red bony areas      Outcome: Progressing  Goal: Incision(s), wounds(s) or drain site(s) healing without S/S of infection  Description: INTERVENTIONS  - Assess and document dressing, incision, wound bed, drain sites and surrounding tissue  - Provide patient and family education  - Perform skin care/dressing changes every shift  Outcome: Progressing  Goal: Pressure injury heals and does not worsen  Description: Interventions:  - Implement low air loss mattress or specialty surface (Criteria  met)  - Apply silicone foam dressing  - Instruct/assist with weight shifting every 120 minutes when in chair   - Limit chair time to 4 hour intervals  - Use special pressure reducing interventions such as waffle cushion when in chair   - Apply fecal or urinary incontinence containment device   - Perform passive or active ROM every shift  - Turn and reposition patient & offload bony prominences every 2 hours   - Utilize friction reducing device or surface for transfers   - Consider consults to  interdisciplinary teams such as PT/OT  - Use incontinent care products after each incontinent episode such as foaming skin cleanser  - Consider nutrition services referral as needed  Outcome: Progressing     Problem: HEMATOLOGIC - ADULT  Goal: Maintains hematologic stability  Description: INTERVENTIONS  - Assess for signs and symptoms of bleeding or hemorrhage  - Monitor labs  - Administer supportive blood products/factors as ordered and appropriate  Outcome: Progressing     Problem: MUSCULOSKELETAL - ADULT  Goal: Maintain or return mobility to safest level of function  Description: INTERVENTIONS:  - Assess patient's ability to carry out ADLs; assess patient's baseline for ADL function and identify physical deficits which impact ability to perform ADLs (bathing, care of mouth/teeth, toileting, grooming, dressing, etc.)  - Assess/evaluate cause of self-care deficits   - Assess range of motion  - Assess patient's mobility  - Assess patient's need for assistive devices and provide as appropriate  - Encourage maximum independence but intervene and supervise when necessary  - Involve family in performance of ADLs  - Assess for home care needs following discharge   - Consider OT consult to assist with ADL evaluation and planning for discharge  - Provide patient education as appropriate  Outcome: Progressing  Goal: Maintain proper alignment of affected body part  Description: INTERVENTIONS:  - Support, maintain and protect limb and  body alignment  - Provide patient/ family with appropriate education  Outcome: Progressing     Problem: Neurological Deficit  Goal: Neurological status is stable or improving  Description: Interventions:  - Monitor and assess patient's level of consciousness, motor function, sensory function, and level of assistance needed for ADLs.   - Monitor and report changes from baseline. Collaborate with interdisciplinary team to initiate plan and implement interventions as ordered.   - Provide and maintain a safe environment.  - Consider seizure precautions.  - Consider fall precautions.  - Consider aspiration precautions.  - Consider bleeding precautions.  Outcome: Progressing     Problem: Activity Intolerance/Impaired Mobility  Goal: Mobility/activity is maintained at optimum level for patient  Description: Interventions:  - Assess and monitor patient  barriers to mobility and need for assistive/adaptive devices.  - Assess patient's emotional response to limitations.  - Collaborate with interdisciplinary team and initiate plans and interventions as ordered.  - Encourage independent activity per ability.  - Maintain proper body alignment.  - Perform active/passive rom as tolerated/ordered.  - Plan activities to conserve energy.  - Turn patient as appropriate  Outcome: Progressing     Problem: Communication Impairment  Goal: Ability to express needs and understand communication  Description: Assess patient's communication skills and ability to understand information.  Patient will demonstrate use of effective communication techniques, alternative methods of communication and understanding even if not able to speak.     - Encourage communication and provide alternate methods of communication as needed.  - Collaborate with case management/ for discharge needs.  - Include patient/family/caregiver in decisions related to communication.  Outcome: Progressing     Problem: Potential for Aspiration  Goal:  Non-ventilated patient's risk of aspiration is minimized  Description: Assess and monitor vital signs, respiratory status, and labs (WBC).  Monitor for signs of aspiration (tachypnea, cough, rales, wheezing, cyanosis, fever).    - Assess and monitor patient's ability to swallow.  - Place patient up in chair to eat if possible.  - HOB up at 90 degrees to eat if unable to get patient up into chair.  - Supervise patient during oral intake.   - Instruct patient/ family to take small bites.  - Instruct patient/ family to take small single sips when taking liquids.  - Follow patient-specific strategies generated by speech pathologist.  Outcome: Progressing  Goal: Ventilated patient's risk of aspiration is minimized  Description: Assess and monitor vital signs, respiratory status, airway cuff pressure, and labs (WBC).  Monitor for signs of aspiration (tachypnea, cough, rales, wheezing, cyanosis, fever).    - Elevate head of bed 30 degrees if patient has tube feeding.  - Monitor tube feeding.  Outcome: Progressing     Problem: Nutrition  Goal: Nutrition/Hydration status is improving  Description: Monitor and assess patient's nutrition/hydration status for malnutrition (ex- brittle hair, bruises, dry skin, pale skin and conjunctiva, muscle wasting, smooth red tongue, and disorientation). Collaborate with interdisciplinary team and initiate plan and interventions as ordered.  Monitor patient's weight and dietary intake as ordered or per policy. Utilize nutrition screening tool and intervene per policy. Determine patient's food preferences and provide high-protein, high-caloric foods as appropriate.     - Assist patient with eating.  - Allow adequate time for meals.  - Encourage patient to take dietary supplement as ordered.  - Collaborate with clinical nutritionist.  - Include patient/family/caregiver in decisions related to nutrition.  Outcome: Progressing

## 2024-05-16 NOTE — UTILIZATION REVIEW
Initial Clinical Review    Admission: Date/Time/Statement:   Admission Orders (From admission, onward)       Ordered        05/15/24 1749  Inpatient Admission  Once                          Orders Placed This Encounter   Procedures    Inpatient Admission     Standing Status:   Standing     Number of Occurrences:   1     Order Specific Question:   Level of Care     Answer:   Critical Care [15]     Order Specific Question:   Estimated length of stay     Answer:   More than 2 Midnights     Order Specific Question:   Certification     Answer:   I certify that inpatient services are medically necessary for this patient for a duration of greater than two midnights. See H&P and MD Progress Notes for additional information about the patient's course of treatment.     ED Arrival Information       Patient not seen in ED                         Initial Presentation: 73 y.o. female, Transfer from [facility] ED initially presented there today on 5/15 for L facial droop/UE weakness to [facility]. Stroke alert initiated. R MCA occulsion noted, TNK given, Endovascular alert. Now s/p thrombectomy with no neuro deficits.   Admit to Inpatient Dx; Acute R MCA CVA, S/p thrombectomy 5/15. Stroke workup. NIH 2. TNK 5/15 1500. MRI. Repeat CT Head 24h post TNK. Echo. Neurology and Neurosurgery consult. Neurovascular and neuro checks. Statin.   CTA-Occlusive thrombus in right MCA distal M1 bifurcation extending into proximal M2 superior and inferior divisions. Contrast opacification is noted beyond this occluded segment likely due to good collaterals. Severe stenosis in right PCA P2 segment.          5/15  Neurosurgery cons; Right MCA occlusion.   Pt improved symptoms however not at baseline. Recommend Cerebral angiography with mechanical thrombectomy      5/15 S/p IR - Findings:   Distal right MCA M1 occlusion, TICI 0.  Successful mechanical thrombectomy, TICI 3  Right femoral sheath removed, closure device deployed.    Date: 5/16   Day 2:    Progress notes; MRI and Echo pending.     Per Neurology; Echo. Will need long term cardiac monitoring.  MRI with very small residual R frontal lobe infarct s/p TNK and successful thrombectomy with neuro exam unremarkable aside from minimal L proximal weakness which patient reports may have been present prior to stroke. Concern for central embolic etiology.      Neurosurgery cons; PPD 1 right MCA thrombectomy, TICI 3.    Frequent neuro checks. Plan to repeat CT head today 24 hours post TNK. AP/AC meds.     Vitals   Temperature Pulse Respirations Blood Pressure SpO2   05/15/24 1730 05/15/24 1730 05/15/24 1730 05/15/24 1730 05/15/24 1730   98.5 °F (36.9 °C) 78 18 125/68 94 %      Temp Source Heart Rate Source Patient Position - Orthostatic VS BP Location FiO2 (%)   05/15/24 1730 05/16/24 0800 05/16/24 0800 05/16/24 0800 --   Oral Monitor Lying Right arm       Pain Score       05/15/24 1730       No Pain          Wt Readings from Last 1 Encounters:   05/16/24 66.2 kg (146 lb)     Additional Vital Signs:   5/16/24 0800 98.3 °F (36.8 °C) 62 18 119/67 97 96 % None (Room air) Lying   05/16/24 0630 -- 64 24 Abnormal  125/61 80 96 % -- --   05/16/24 0615 -- 62 16 119/86 98 96 % -- --     05/16/24 0015 98.7 °F (37.1 °C) 60 26 Abnormal  -- -- 96 % -- --   05/15/24 2300 -- 60 18 123/76 108 -- -- --   05/15/24 2030 -- 76 38 Abnormal  106/63 71 95 % -- --   05/15/24 2015 -- 68 28 Abnormal  117/60 87 96 % -- --   05/15/24 2000 98.2 °F (36.8 °C) 70 36 Abnormal  152/76 112 96 % -- --   05/15/24 1930 -- 68 -- 158/79 119 -- -- --   05/15/24 1915 -- 64 17 142/71 106 94 % --      Pertinent Labs/Diagnostic Test Results:   MRI brain wo contrast   Final Result by E. Alec Schoenberger, MD (05/16 0826)   Very small acute infarct in the right frontal lobe. No acute hemorrhage   Mild chronic microangiopathy      Workstation performed: [id number]         IR stroke alert    (Results Pending)     CT head wo contrast (5/15) -  (Results Pending)          Results from last 7 days   Lab Units 05/16/24  0622 05/15/24  1455   WBC Thousand/uL 7.59 7.34   HEMOGLOBIN g/dL 11.7 12.7   HEMATOCRIT % 35.2 38.1   PLATELETS Thousands/uL 272 299   TOTAL NEUT ABS Thousands/µL 4.00  --          Results from last 7 days   Lab Units 05/16/24  0622 05/15/24  1455   SODIUM mmol/L 139 137   POTASSIUM mmol/L 4.9 3.8   CHLORIDE mmol/L 105 104   CO2 mmol/L 25 27   ANION GAP mmol/L 9 6   BUN mg/dL 22 23   CREATININE mg/dL 0.96 1.02   EGFR ml/min/1.73sq m 59 55   CALCIUM mg/dL 8.5 8.6   MAGNESIUM mg/dL 2.0  --    PHOSPHORUS mg/dL 3.8  --      Results from last 7 days   Lab Units 05/16/24  0622   AST U/L 27   ALT U/L 17   ALK PHOS U/L 59   TOTAL PROTEIN g/dL 6.3*   ALBUMIN g/dL 3.4*   TOTAL BILIRUBIN mg/dL 0.65         Results from last 7 days   Lab Units 05/16/24  0622 05/15/24  1455   GLUCOSE RANDOM mg/dL 93 99         Results from last 7 days   Lab Units 05/15/24  1455   HEMOGLOBIN A1C % 6.0*   EAG mg/dl 126       Results from last 7 days   Lab Units 05/15/24  1455   PROTIME seconds 15.2*   INR  1.17   PTT seconds 28       No past medical history on file.  Present on Admission:  **None**      Admitting Diagnosis: Stroke due to embolism of right middle cerebral artery (HCC) [I63.411]  Age/Sex: 73 y.o. female    Admission Orders:  Scheduled Medications:  atorvastatin, 40 mg, Oral, Daily With Dinner  chlorhexidine, 15 mL, Mouth/Throat, Q12H CAMPOS      Continuous IV Infusions: None     PRN Meds:  hydrALAZINE, 5 mg, Intravenous, Q6H PRN  labetalol, 10 mg, Intravenous, Q6H PRN 5/15 x1        IP CONSULT TO CASE MANAGEMENT  IP CONSULT TO NEUROLOGY  IP CONSULT TO NEUROSURGERY    Network Utilization Review Department  ATTENTION: Please call with any questions or concerns to [phone] and carefully listen to the prompts so that you are directed to the right person. All voicemails are confidential.   For Discharge needs, contact Care Management DC Support Team at [phone] opt. [phone]  Send all  requests for admission clinical reviews, approved or denied determinations and any other requests to dedicated fax number below belonging to the campus where the patient is receiving treatment. List of dedicated fax numbers for the Facilities:  FACILITY NAME UR FAX NUMBER   ADMISSION DENIALS (Administrative/Medical Necessity) [phone]   DISCHARGE SUPPORT TEAM (NETWORK) [phone]   PARENT CHILD HEALTH (Maternity/NICU/Pediatrics) [phone]   [facility] [phone]   [facility] [phone]   [facility] [phone]   [facility] [phone]   [facility] [phone]   [facility] [phone]   [facility] [phone]   [facility] [phone]   [facility] [phone]   [facility] [phone]   [facility] [phone]   [facility] [phone]

## 2024-05-16 NOTE — PROGRESS NOTES
[facility]  Progress Note: Critical Care  Name: Radha Dodson 73 y.o. female I MRN: [id number]  Unit/Bed#: ICU 14 I Date of Admission: 5/15/2024   Date of Service: 5/16/2024 I Hospital Day: 1    Assessment & Plan   Neuro:                        Diagnosis: Acute R MCA CVAs/p thrombectomy 5/15  NIH 2  TNK 5/15 1500  CTA-Occlusive thrombus in right MCA distal M1 bifurcation extending into proximal M2 superior and inferior divisions. Contrast opacification is noted beyond this occluded segment likely due to good collaterals. Severe stenosis in right PCA P2 segment.        MRI P  Repeat CTH 24h post TNK  A1C 6  /179/45/135  ECHO P  Plan:   Neuro/Neurosx consulted  Stroke Pathway  Neuro checks stat CTH for any change GCS> 2 ps  Neurovasc checks/groin per protocol  Statin  ASA/DVT ppx once cleared by neurosx  -140     CV:   No active issues     Pulm:  No active issues     GI:   No active issues     :   No active issues     F/E/N:    No active issues     Heme/Onc:   No active issues     Endo:   No active issues     ID:   No active issues     MSK/Skin:   No active issues    Disposition: Med Surg with Telemetry    ICU Core Measures     A: Assess, Prevent, and Manage Pain Has pain been assessed? Yes  Need for changes to pain regimen? No   B: Both SAT/SAT  N/A   C: Choice of Sedation RASS Goal: 0 Alert and Calm  Need for changes to sedation or analgesia regimen? No   D: Delirium CAM-ICU: Negative   E: Early Mobility  Plan for early mobility? Yes   F: Family Engagement Plan for family engagement today? Yes         Prophylaxis:  VTE Contraindicated secondary to: f/u neurosx   Stress Ulcer  not ordered        Significant 24hr Events     24hr events: s/p thrombectomy     Subjective     Review of Systems   Constitutional: Negative.    Respiratory: Negative.     Cardiovascular: Negative.    Gastrointestinal: Negative.    Genitourinary: Negative.    Musculoskeletal: Negative.     Neurological: Negative.    All other systems reviewed and are negative.       Objective                            Vitals I/O      Most Recent Min/Max in 24hrs   Temp 98.7 °F (37.1 °C) Temp  Min: 98.1 °F (36.7 °C)  Max: 98.7 °F (37.1 °C)   Pulse 60 Pulse  Min: 60  Max: 93   Resp (!) 26 Resp  Min: 12  Max: 38   /76 BP  Min: 106/63  Max: 172/82   O2 Sat 96 % SpO2  Min: 94 %  Max: 98 %      Intake/Output Summary (Last 24 hours) at 5/16/2024 0126  Last data filed at 5/15/2024 2101  Gross per 24 hour   Intake 1000 ml   Output 650 ml   Net 350 ml       Diet Regular; Regular House    Invasive Monitoring           Physical Exam   Physical Exam  Eyes:      Pupils: Pupils are equal, round, and reactive to light.   Skin:     General: Skin is warm and dry.   HENT:      Head: Normocephalic and atraumatic.      Mouth/Throat:      Mouth: Mucous membranes are moist.   Cardiovascular:      Rate and Rhythm: Normal rate and regular rhythm.      Pulses: Normal pulses.      Heart sounds: Normal heart sounds.   Musculoskeletal:         General: No swelling. Normal range of motion.      Right lower leg: No edema.      Left lower leg: No edema.      Comments: R groin CDI   Abdominal: General: Bowel sounds are normal. There is no distension.      Palpations: Abdomen is soft.      Tenderness: There is no abdominal tenderness.   Constitutional:       General: She is not in acute distress.  Pulmonary:      Effort: Pulmonary effort is normal. No respiratory distress.      Breath sounds: Normal breath sounds.   Neurological:      General: No focal deficit present.      Mental Status: She is alert and oriented to person, place and time.      GCS: GCS eye subscore is 4. GCS verbal subscore is 5. GCS motor subscore is 6.      Motor: Strength full and intact in all extremities. No motor deficit.            Diagnostic Studies      EKG: tele  Imaging:  I have personally reviewed pertinent reports.   and I have personally reviewed pertinent  films in PACS     Medications:  Scheduled PRN   atorvastatin, 40 mg, Daily With Dinner  chlorhexidine, 15 mL, Q12H CAMPOS      hydrALAZINE, 5 mg, Q6H PRN  labetalol, 10 mg, Q6H PRN       Continuous          Labs:    CBC    Recent Labs     05/15/24  1455   WBC 7.34   HGB 12.7   HCT 38.1        BMP    Recent Labs     05/15/24  1455   SODIUM 137   K 3.8      CO2 27   AGAP 6   BUN 23   CREATININE 1.02   CALCIUM 8.6       Coags    Recent Labs     05/15/24  1455   INR 1.17   PTT 28        Additional Electrolytes  No recent results       Blood Gas    No recent results  No recent results LFTs  No recent results    Infectious  No recent results  Glucose  Recent Labs     05/15/24  1455   GLUC 99               ERAN [place]

## 2024-05-16 NOTE — PHYSICAL THERAPY NOTE
PHYSICAL THERAPY EVALUATION  NAME:  Radha Dodson  DATE: 05/16/24    AGE:   73 y.o.  Mrn:   [id number]  ADMIT DX:  Stroke due to embolism of right middle cerebral artery (HCC) [I63.411]    No past medical history on file.  No past surgical history on file.    Length Of Stay: 1  PHYSICAL THERAPY EVALUATION :    05/16/24 0944   PT Last Visit   PT Visit Date 05/16/24   Note Type   Note type Evaluation   Education   Education Provided Yes  (BE-Fast stroke education/ safety/ fall prevention)   End of Consult   Patient Position at End of Consult Bedside chair;All needs within reach   Pain Assessment   Pain Assessment Tool 0-10   Pain Score No Pain   Restrictions/Precautions   Weight Bearing Precautions Per Order No   Other Precautions Multiple lines;Telemetry   Home Living   Type of Home House   Home Layout Two level  (3STE)   Bathroom Shower/Tub Tub/shower unit   Bathroom Toilet Standard   Home Equipment   (none denies)   Prior Function   Level of [place] Independent with ADLs;Independent with functional mobility;Independent with IADLS   Lives With Spouse   Receives Help From Family   IADLs Independent with driving;Independent with meal prep;Independent with medication management   Falls in the last 6 months 0   Vocational Part time employment   Comments At baseline, pt reports being functionally indep and not using AD for mobility; I w/ ADL's and IADLs ; lives w/ spouse windy 2SH w/ 3STE (drives) works PT   General   Family/Caregiver Present No   Cognition   Overall Cognitive Status WFL   Attention Within functional limits   Orientation Level Oriented X4   Memory Within functional limits   Following Commands Follows all commands and directions without difficulty   Subjective   Subjective I feel good.   RUE Assessment   RUE Assessment WFL   LUE Assessment   LUE Assessment WFL   RLE Assessment   RLE Assessment WFL  (5/5)   LLE Assessment   LLE Assessment WFL  (5/5)   Vision-Basic Assessment   Current Vision No  visual deficits   Coordination   Movements are Fluid and Coordinated 1   Sensation WFL   Finger to Nose & Finger to Finger  Intact   Heel to Shin Intact   Light Touch   RLE Light Touch Grossly intact   LLE Light Touch Grossly intact   Sharp/Dull   RLE Sharp/Dull Grossly intact   LLE Sharp/Dull Grossly intact   Proprioception   RLE Proprioception Grossly intact   LLE Proprioception Grossly Intact   Bed Mobility   Supine to Sit 7  Independent   Sit to Supine 7  Independent   Transfers   Sit to Stand 5  Supervision  (> indep)   Stand to Sit 5  Supervision  (> indep)   Additional Comments no AD   Ambulation/Elevation   Gait pattern WNL   Gait Assistance 5  Supervision  (progressing to indep w/o AD)   Assistive Device None   Distance > 350/ head turning; obstacles w/o LOB or difficulty. stable vitals and no dizziness   Stair Management Assistance 5  Supervision   Stair Management Technique One rail R;Alternating pattern;Foreward   Number of Stairs 10   Balance   Static Sitting Normal   Dynamic Sitting Good   Static Standing Fair +   Dynamic Standing Fair +   Ambulatory Fair +   Endurance Deficit   Endurance Deficit No   Activity Tolerance   Activity Tolerance Patient tolerated treatment well   Medical Staff Made Aware OT and restorative   Nurse Made Aware yes   Assessment   Prognosis Good   Assessment Pt is 73 y.o. female seen for PT evaluation s/p admit to [facility] on 5/15/2024  w/  L facial droop and L sided weakness.CTA demonstrated distal right MCA M1 occlusion.  Pt received TNK and underwent Right MCA thrombectomy, TICI 3 on 5/15.   PT consulted for mobility and to assist w/ d/c planning recommendations.  Pt  has no past medical history on file. (Does not regularly f/u w/ doctors)   Due to acute medical issues, ongoing medical management  for primary dx; continuous monitoring, trending labs;  multiple lines, stroke pathway; note unstable clinical picture (high complexity).  At baseline, pt reports being  functionally indep and not using AD for mobility; I w/ ADL's and IADLs ; lives w/ spouse windy 2SH w/ 3STE (drives) works PT. Currently,  pt  is requiring indep A for bed skills; S> indep for functional transfers and distant S for ambulation > 350' + stairs + HR (step over step).   Pt presents functioning S> indep w/o AD and and currently w/o notable deficits.   (Please find additional objective findings from PT assessment regarding body systems outlined above.) Pt w/ no acute PT needs identified. PT signing off. Pt cleared for d/c w/o DME needs when medically stable.   Goals   Patient Goals go home   Discharge Recommendation   Rehab Resource Intensity Level, PT No post-acute rehabilitation needs   AM-PAC Basic Mobility Inpatient   Turning in Flat Bed Without Bedrails 4   Lying on Back to Sitting on Edge of Flat Bed Without Bedrails 4   Moving Bed to Chair 4   Standing Up From Chair Using Arms 4   Walk in Room 4   Climb 3-5 Stairs With Railing 4   Basic Mobility Inpatient Raw Score 24   Basic Mobility Standardized Score 57.68   [facility] Highest Level Of Mobility   -HLM Goal 8: Walk 250 feet or more   -HLM Achieved 8: Walk 250 feet ot more   Barthel Index   Grooming Score 5   Dressing Score 10   Toilet Use Score 10   Transfers (Bed/Chair) Score 15   Mobility (Level Surface) Score 15     The patient's AM-PAC Basic Mobility Inpatient Short Form Raw Score is 24. A Raw score of greater than 16 suggests the patient may benefit from discharge to home. Please also refer to the recommendation of the Physical Therapist for safe discharge planning.

## 2024-05-16 NOTE — CASE MANAGEMENT
Case Management Assessment & Discharge Planning Note    Patient name Radha Dodson  Location ICU 14/ICU 14 MRN [id number]  : 1950 Date 2024       Current Admission Date: 5/15/2024  Current Admission Diagnosis:Acute ischemic right MCA stroke (HCC)  Patient Active Problem List    Diagnosis Date Noted Date Diagnosed    Acute ischemic right MCA stroke (HCC) 2024       LOS (days): 1  Geometric Mean LOS (GMLOS) (days):   Days to GMLOS:     OBJECTIVE:    Risk of Unplanned Readmission Score: 7.7         Current admission status: Inpatient       Preferred Pharmacy: No Pharmacies Listed  Primary Care Provider: No primary care provider on file.    Primary Insurance: [employer]  REP  Secondary Insurance:     ASSESSMENT:  Active Health Care Proxies    There are no active Health Care Proxies on file.                 Readmission Root Cause  30 Day Readmission: No    Patient Information  Admitted from:: Other (comment) ([place] [facility])  Mental Status: Alert  During Assessment patient was accompanied by: Not accompanied during assessment  Assessment information provided by:: Patient  Primary Caregiver: Self  Support Systems: Spouse/significant other, Family members  County of Residence: [facility]  What city do you live in?: [place]  Home entry access options. Select all that apply.: Stairs  Number of steps to enter home.: 2  Do the steps have railings?: Yes  Type of Current Residence: [address] [place] [address] home    Activities of Daily Living Prior to Admission  Functional Status: Independent  Completes ADLs independently?: Yes  Ambulates independently?: Yes  Does patient use assisted devices?: No  Does patient currently own DME?: No  Does patient have a history of Outpatient Therapy (PT/OT)?: No  Does the patient have a history of Short-Term Rehab?: No  Does patient have a history of HHC?: No  Does patient currently have HHC?: No         Patient Information Continued  Income Source: Employed (Works at [employer])  Does patient have  prescription coverage?: Yes  Does patient receive dialysis treatments?: No  Does patient have a history of substance abuse?: No         Means of Transportation  Means of Transport to Appts:: Drives Self      Social Determinants of Health (SDOH)      Flowsheet Row Most Recent Value   Housing Stability    In the last 12 months, was there a time when you were not able to pay the mortgage or rent on time? N   In the past 12 months, how many times have you moved where you were living? 1   At any time in the past 12 months, were you homeless or living in a shelter (including now)? N   Transportation Needs    In the past 12 months, has lack of transportation kept you from medical appointments or from getting medications? no   In the past 12 months, has lack of transportation kept you from meetings, work, or from getting things needed for daily living? No   Food Insecurity    Within the past 12 months, you worried that your food would run out before you got the money to buy more. Never true   Within the past 12 months, the food you bought just didn't last and you didn't have money to get more. Never true   Utilities    In the past 12 months has the electric, gas, oil, or water company threatened to shut off services in your home? No            DISCHARGE DETAILS:    Discharge planning discussed with:: Patient  Freedom of Choice: Yes  Comments - Freedom of Choice: d/c home no needs  CM contacted family/caregiver?: No- see comments  Were Treatment Team discharge recommendations reviewed with patient/caregiver?: Yes  Did patient/caregiver verbalize understanding of patient care needs?: Yes  Were patient/caregiver advised of the risks associated with not following Treatment Team discharge recommendations?: Yes    Contacts  Patient Contacts: Patient  Contact Method: In Person    Requested Home Health Care         Is the patient interested in HHC at discharge?: No    DME Referral Provided  Referral made for DME?: No               Treatment Team Recommendation: Home  Discharge Destination Plan:: Home  Transport at Discharge :  (Lyft vs family)

## 2024-05-16 NOTE — PROGRESS NOTES
"[facility]  Progress Note  Name: Radha Dodson I  MRN: [id number]  Unit/Bed#: ICU 14 I Date of Admission: 5/15/2024   Date of Service: 5/16/2024 I Hospital Day: 1    Assessment & Plan   Acute ischemic right MCA stroke (HCC)  Assessment & Plan  PPD 1 right MCA thrombectomy, TICI 3  Presented with right MCA syndrome (L facial droop and LUE weakness).  NIH fluctuating.  CTA demonstrated distal right MCA M1 occlusion.  She did receive TNK.  Patient reported taking aspirin 81 mg daily at home.    Imaging:    MRI brain wo 5/15/24:Very small acute infarct in the right frontal lobe. No acute hemorrhage.Mild chronic microangiopathy    Plan:  Continue frequent neurological checks  STAT CT head with any neurological decline including drop in GCS of 2 points within 1 hour  Plan to repeat CT head today 24 hours post TNK  Reviewed imaging with patient  Neurology following, input appreciated  -140, defer further management to neurology  AP/AC medication per neurology  PT/OT/speech therapy following  Mobilize patient as tolerated  Medical management and pain control per primary team  DVT PPX: SCDs    Neurosurgery will sign off, further stroke management per neurology, call with any further questions or concerns.             Subjective/Objective     Chief Complaint: \"Hi\"    Subjective: Patient states she feels great.  She offers no complaints.  She states she has been ambulating.  She is eating without difficulties.    Objective: Patient comfortably laying in bed, NAD.    I/O         05/14 0701  05/15 0700 05/15 0701  05/16 0700 05/16 0701  05/17 0700    P.O.  200     I.V. (mL/kg)  800 (12)     Total Intake(mL/kg)  1000 (15)     Urine (mL/kg/hr)  1550     Other  200     Blood  50     Total Output  1800     Net  -800                    Invasive Devices       Peripheral Intravenous Line  Duration             Peripheral IV 05/15/24 Dorsal (posterior);Right Hand 1 day    Peripheral IV " 05/15/24 Left Antecubital <1 day                    Physical Exam:  Vitals: Blood pressure 147/65, pulse 78, temperature 98.3 °F (36.8 °C), temperature source Oral, resp. rate (!) 49, height 5' (1.524 m), weight 66.2 kg (146 lb), SpO2 99%.,Body mass index is 28.51 kg/m².    Hemodynamic Monitoring: MAP:      General appearance: alert, appears stated age, cooperative and no distress  Head: Normocephalic, without obvious abnormality, atraumatic  Eyes: EOMI, PERRL, conjugate gaze  Neck: supple, symmetrical, trachea midline   Lungs: non labored breathing  Heart: regular heart rate  Neurologic:   Mental status: Alert, oriented x3, thought content appropriate, speech is clear, following commands  Cranial nerves: grossly intact (Cranial nerves II-XII)  Sensory: normal to light touch in all extremities x 4  Motor: moving all extremities without focal weakness, strength 5/5 throughout  Reflexes: 2+ and symmetric, no Ghulam's or clonus appreciated  Coordination: finger to nose normal bilaterally, no drift bilaterally  Right groin site: Pressure dressing removed, site clean, dry, intact.  Small area of ecchymosis.  No erythema or drainage noted.  Bilateral pedal pulses 2+    Lab Results:  Results from last 7 days   Lab Units 05/16/24  0622 05/15/24  1455   WBC Thousand/uL 7.59 7.34   HEMOGLOBIN g/dL 11.7 12.7   HEMATOCRIT % 35.2 38.1   PLATELETS Thousands/uL 272 299   SEGS PCT % 52  --    MONO PCT % 13*  --    EOS PCT % 3  --      Results from last 7 days   Lab Units 05/16/24  0622 05/15/24  1455   SODIUM mmol/L 139 137   POTASSIUM mmol/L 4.9 3.8   CHLORIDE mmol/L 105 104   CO2 mmol/L 25 27   BUN mg/dL 22 23   CREATININE mg/dL 0.96 1.02   CALCIUM mg/dL 8.5 8.6   ALK PHOS U/L 59  --    ALT U/L 17  --    AST U/L 27  --      Results from last 7 days   Lab Units 05/16/24  0622   MAGNESIUM mg/dL 2.0     Results from last 7 days   Lab Units 05/16/24  0622   PHOSPHORUS mg/dL 3.8     Results from last 7 days   Lab Units  "05/15/24  1455   INR  1.17   PTT seconds 28     No results found for: \"TROPONINT\"  ABG:No results found for: \"PHART\", \"BGK3WYR\", \"PO2ART\", \"VSG1OAK\", \"U0HUVKVV\", \"BEART\", \"SOURCE\"    Imaging Studies: I have personally reviewed pertinent reports.   and I have personally reviewed pertinent films in PACS    MRI brain wo contrast    Result Date: 5/16/2024  Impression: Very small acute infarct in the right frontal lobe. No acute hemorrhage Mild chronic microangiopathy Workstation performed: [id number]     CTA stroke alert (head/neck)    Result Date: 5/15/2024  Impression: Occlusive thrombus in right MCA distal M1 bifurcation extending into proximal M2 superior and inferior divisions. Contrast opacification is noted beyond this occluded segment likely due to good collaterals. Severe stenosis in right PCA P2 segment. Additional chronic/incidental findings as detailed above. Findings were directly discussed with Aram Bautista at approximately 2:48 PM. Workstation performed: [id number]     CT stroke alert brain    Result Date: 5/15/2024  Impression: No acute intracranial abnormality. Mild chronic microangiopathy. Findings were directly discussed with Aram Bautista at approximately 2:48 PM. Workstation performed: [id number]       EKG, Pathology, and Other Studies: I have personally reviewed pertinent reports.        VTE Mechanical Prophylaxis: sequential compression device     "

## 2024-05-16 NOTE — OCCUPATIONAL THERAPY NOTE
Occupational Therapy Evaluation     Patient Name: Radha Dodson  Today's Date: 5/16/2024  Problem List  Active Problems:    Acute ischemic right MCA stroke (HCC)    Past Medical History  No past medical history on file.  Past Surgical History  No past surgical history on file.      05/16/24 1001   OT Last Visit   OT Visit Date 05/16/24   Note Type   Note type Evaluation   Pain Assessment   Pain Assessment Tool 0-10   Pain Score No Pain   Restrictions/Precautions   Weight Bearing Precautions Per Order No   Other Precautions Multiple lines;Telemetry   Home Living   Type of Home House   Home Layout Two level  (3 ROXANA)   Bathroom Shower/Tub Tub/shower unit   Bathroom Toilet Standard   Bathroom Equipment Other (Comment)  (denies any)   Home Equipment Other (Comment)  (denies any)   Prior Function   Level of [place] Independent with ADLs;Independent with functional mobility;Independent with IADLS   Lives With Spouse   Receives Help From Family   IADLs Independent with driving;Independent with meal prep;Independent with medication management   Falls in the last 6 months 0   Vocational Part time employment   Comments (+)    Lifestyle   Autonomy I w/ ADLS/IADLS, transfers and functional mobility PTA   Reciprocal Relationships Pt lives w/ her spouse   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)   Intrinsic Gratification 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   ADL   Eating Assistance 7  Independent   Grooming Assistance 7  Independent   UB Bathing Assistance 7  Independent   LB Bathing Assistance 7  Independent   UB Dressing Assistance 7  Independent   LB Dressing Assistance 7  Independent   Toileting Assistance  7  Independent   Functional Assistance 7  Independent   Bed Mobility   Supine to Sit Unable to assess   Sit to Supine Unable to assess   Additional Comments pt exiting bathroom upon entrance into room; seated up OOB in recliner at end of session   Transfers    Sit to Stand 7  Independent   Stand to Sit 7  Independent   Additional Comments no AD/DME Used   Functional Mobility   Functional Mobility 7  Independent   Additional Comments pt engaged in short household distance functional mobility independently, no AD/DME used   Balance   Static Sitting Normal   Dynamic Sitting Good   Static Standing Good   Dynamic Standing Good   Ambulatory Good   Higher level balance Side stepping   Activity Tolerance   Activity Tolerance Patient tolerated treatment well   Medical Staff Made Aware PT   Nurse Made Aware yes   RUE Assessment   RUE Assessment WFL   LUE Assessment   LUE Assessment WFL   Hand Function   Gross Motor Coordination Functional   Fine Motor Coordination Functional   Sensation   Light Touch No apparent deficits   Proprioception   Proprioception No apparent deficits   Vision-Basic Assessment   Current Vision No visual deficits   Vision - Complex Assessment   Ocular Range of Motion Intact   Perception   Inattention/Neglect Appears intact   Cognition   Overall Cognitive Status WFL   Arousal/Participation Responsive;Cooperative   Attention Within functional limits   Orientation Level Oriented X4   Memory Within functional limits   Following Commands Follows all commands and directions without difficulty   Comments pt is pleasant and cooperative   Assessment   Limitation Decreased endurance;Decreased high-level ADLs   Prognosis Fair   Assessment Pt is a 72 y/o female seen for OT eval s/p adm to [facility] w/ L facial droop, UE weakness. Transferred from [facility], s/p TNK and thrombectomy, dx'd w/ R MCA CVA. Pt  has no past medical history on file. Pt with active OT orders and activity as tolerated orders. Pt lives with her spouse in 2 SH, 3 ROXANA. Pt was I w/  ADLS and IADLS, drove, & required no use of DME PTA. Pt is not currently demonstrating any significant deficits in occupational performance at this time. Overall pt is functioning at an independent level w/o AD/DME. Pt has family  support available as needed; reports no concerns regarding D/C home once medically stable. Pt has no further immediate acute skilled OT needs; will D/C OT at this time.   Goals   Patient Goals to go home   Discharge Recommendation   Rehab Resource Intensity Level, OT No post-acute rehabilitation needs   Additional Comments  The patient's raw score on the AM-PAC Daily Activity Inpatient Short Form is 24. A raw score of greater than or equal to 19 suggests the patient may benefit from discharge to home. Please refer to the recommendation of the Occupational Therapist for safe discharge planning.   Additional Comments 2 Pt seen as a co-session due to the patient's co-morbidities, clinically unstable presentation, and present impairments which are a regression from the patient's baseline.   AM-[facility] Daily Activity Inpatient   Lower Body Dressing 4   Bathing 4   Toileting 4   Upper Body Dressing 4   Grooming 4   Eating 4   Daily Activity Raw Score 24   Daily Activity Standardized Score (Calc for Raw Score >=11) 57.54   AM-PAC Applied Cognition Inpatient   Following a Speech/Presentation 4   Understanding Ordinary Conversation 4   Taking Medications 4   Remembering Where Things Are Placed or Put Away 4   Remembering List of 4-5 Errands 4   Taking Care of Complicated Tasks 4   Applied Cognition Raw Score 24   Applied Cognition Standardized Score 62.21   Barthel Index   Feeding 10   Bathing 5   Grooming Score 5   Dressing Score 10   Bladder Score 10   Bowels Score 10   Toilet Use Score 10   Transfers (Bed/Chair) Score 15   Mobility (Level Surface) Score 15   Stairs Score 10   Barthel Index Score 100   End of Consult   Education Provided Yes   Patient Position at End of Consult Bedside chair;All needs within reach   Nurse Communication Nurse aware of consult     Gila Guzman MS, OTR/L

## 2024-05-16 NOTE — NURSING NOTE
Patient out of bed to chair. No impairments walking/standing/mobility. Able to enjoy dinner in chair. Walked to bathroom and brushed teeth.    MRI planned for 10:45 pm.    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    Patient is friendly and a pleasure to care for.    Constantly on phone with sister.

## 2024-05-16 NOTE — QUICK NOTE
Inpatient consult to Neurology  Consult performed by: Aleksandra Mcdermott  Consult ordered by: Alondra Riley,         Duplicate consult request. Please see initial consult performed on 5/15/24.

## 2024-05-16 NOTE — RESTORATIVE TECHNICIAN NOTE
Restorative Technician Note      Patient Name: Radha Dodson     Restorative Tech Visit Date: 05/16/24  Note Type: Mobility  Patient Position Upon Consult: Supine  Activity Performed: Ambulated  Assistive Device: Other (Comment) (HHA X 1)  Education Provided: Yes  Patient Position at End of Consult: Standing; All needs within reach (with PT/OT)    Rylie Freeman Restorative Tech

## 2024-05-17 VITALS
RESPIRATION RATE: 18 BRPM | SYSTOLIC BLOOD PRESSURE: 147 MMHG | OXYGEN SATURATION: 95 % | BODY MASS INDEX: 28.66 KG/M2 | HEART RATE: 67 BPM | HEIGHT: 60 IN | DIASTOLIC BLOOD PRESSURE: 80 MMHG | TEMPERATURE: 98.1 F | WEIGHT: 146 LBS

## 2024-05-17 PROBLEM — N13.30 HYDROURETERONEPHROSIS: Status: ACTIVE | Noted: 2024-05-17

## 2024-05-17 PROBLEM — R73.03 PREDIABETES: Status: ACTIVE | Noted: 2024-05-17

## 2024-05-17 LAB
ANION GAP SERPL CALCULATED.3IONS-SCNC: 10 MMOL/L (ref 4–13)
BACTERIA UR QL AUTO: NORMAL /HPF
BILIRUB UR QL STRIP: NEGATIVE
BUN SERPL-MCNC: 20 MG/DL (ref 5–25)
CALCIUM SERPL-MCNC: 9.2 MG/DL (ref 8.4–10.2)
CHLORIDE SERPL-SCNC: 103 MMOL/L (ref 96–108)
CLARITY UR: CLEAR
CO2 SERPL-SCNC: 27 MMOL/L (ref 21–32)
COLOR UR: NORMAL
CREAT SERPL-MCNC: 1.1 MG/DL (ref 0.6–1.3)
GFR SERPL CREATININE-BSD FRML MDRD: 50 ML/MIN/1.73SQ M
GLUCOSE SERPL-MCNC: 92 MG/DL (ref 65–140)
GLUCOSE UR STRIP-MCNC: NEGATIVE MG/DL
HGB UR QL STRIP.AUTO: NEGATIVE
KETONES UR STRIP-MCNC: NEGATIVE MG/DL
LEUKOCYTE ESTERASE UR QL STRIP: NEGATIVE
NITRITE UR QL STRIP: NEGATIVE
NON-SQ EPI CELLS URNS QL MICRO: NORMAL /HPF
PH UR STRIP.AUTO: 6.5 [PH]
POTASSIUM SERPL-SCNC: 4.1 MMOL/L (ref 3.5–5.3)
PROT UR STRIP-MCNC: NEGATIVE MG/DL
RBC #/AREA URNS AUTO: NORMAL /HPF
SODIUM SERPL-SCNC: 140 MMOL/L (ref 135–147)
SP GR UR STRIP.AUTO: 1.02 (ref 1–1.03)
UROBILINOGEN UR STRIP-ACNC: <2 MG/DL
WBC #/AREA URNS AUTO: NORMAL /HPF

## 2024-05-17 PROCEDURE — 80048 BASIC METABOLIC PNL TOTAL CA: CPT

## 2024-05-17 PROCEDURE — 81001 URINALYSIS AUTO W/SCOPE: CPT

## 2024-05-17 RX ORDER — CLOPIDOGREL BISULFATE 75 MG/1
75 TABLET ORAL DAILY
Status: DISCONTINUED | OUTPATIENT
Start: 2024-05-18 | End: 2024-05-17 | Stop reason: HOSPADM

## 2024-05-17 RX ORDER — ASPIRIN 81 MG/1
81 TABLET, CHEWABLE ORAL DAILY
Qty: 20 TABLET | Refills: 0 | Status: SHIPPED | OUTPATIENT
Start: 2024-05-18 | End: 2024-06-07

## 2024-05-17 RX ORDER — CLOPIDOGREL BISULFATE 75 MG/1
75 TABLET ORAL DAILY
Qty: 20 TABLET | Refills: 0 | Status: CANCELLED | OUTPATIENT
Start: 2024-05-18 | End: 2024-06-07

## 2024-05-17 RX ORDER — ATORVASTATIN CALCIUM 40 MG/1
40 TABLET, FILM COATED ORAL
Qty: 30 TABLET | Refills: 5 | Status: SHIPPED | OUTPATIENT
Start: 2024-05-17

## 2024-05-17 RX ORDER — ATORVASTATIN CALCIUM 40 MG/1
40 TABLET, FILM COATED ORAL
Qty: 30 TABLET | Refills: 0 | Status: CANCELLED | OUTPATIENT
Start: 2024-05-17

## 2024-05-17 RX ORDER — CLOPIDOGREL BISULFATE 75 MG/1
300 TABLET ORAL ONCE
Status: COMPLETED | OUTPATIENT
Start: 2024-05-17 | End: 2024-05-17

## 2024-05-17 RX ORDER — CLOPIDOGREL BISULFATE 75 MG/1
75 TABLET ORAL DAILY
Qty: 30 TABLET | Refills: 0 | Status: SHIPPED | OUTPATIENT
Start: 2024-05-18 | End: 2024-05-24 | Stop reason: SDUPTHER

## 2024-05-17 RX ORDER — CLOPIDOGREL BISULFATE 75 MG/1
75 TABLET ORAL DAILY
Qty: 20 TABLET | Refills: 0 | Status: SHIPPED | OUTPATIENT
Start: 2024-05-18 | End: 2024-05-17

## 2024-05-17 RX ADMIN — ASPIRIN 81 MG CHEWABLE TABLET 81 MG: 81 TABLET CHEWABLE at 07:46

## 2024-05-17 RX ADMIN — CLOPIDOGREL BISULFATE 300 MG: 75 TABLET ORAL at 12:37

## 2024-05-17 RX ADMIN — ATORVASTATIN CALCIUM 40 MG: 40 TABLET, FILM COATED ORAL at 15:41

## 2024-05-17 NOTE — PLAN OF CARE
Problem: Prexisting or High Potential for Compromised Skin Integrity  Goal: Skin integrity is maintained or improved  Description: INTERVENTIONS:  - Identify patients at risk for skin breakdown  - Assess and monitor skin integrity  - Assess and monitor nutrition and hydration status  - Monitor labs   - Assess for incontinence   - Turn and reposition patient  - Assist with mobility/ambulation  - Relieve pressure over bony prominences  - Avoid friction and shearing  - Provide appropriate hygiene as needed including keeping skin clean and dry  - Evaluate need for skin moisturizer/barrier cream  - Collaborate with interdisciplinary team   - Patient/family teaching  - Consider wound care consult   5/17/2024 0958 by KEVIN BROOKS  Outcome: Progressing  5/17/2024 0901 by KEVIN BROOKS  Outcome: Progressing     Problem: NEUROSENSORY - ADULT  Goal: Achieves stable or improved neurological status  Description: INTERVENTIONS  - Monitor and report changes in neurological status  - Monitor vital signs such as temperature, blood pressure, glucose, and any other labs ordered   - Initiate measures to prevent increased intracranial pressure  - Monitor for seizure activity and implement precautions if appropriate      5/17/2024 0958 by KEVIN BROOKS  Outcome: Progressing  5/17/2024 0901 by KEVIN BROOKS  Outcome: Progressing  Goal: Remains free of injury related to seizures activity  Description: INTERVENTIONS  - Maintain airway, patient safety  and administer oxygen as ordered  - Monitor patient for seizure activity, document and report duration and description of seizure to physician/advanced practitioner  - If seizure occurs,  ensure patient safety during seizure  - Reorient patient post seizure  - Seizure pads on all 4 side rails  - Instruct patient/family to notify RN of any seizure activity including if an aura is experienced  - Instruct patient/family to call for assistance with activity based on nursing  assessment  - Administer anti-seizure medications if ordered    5/17/2024 0958 by KEVIN BROOKS  Outcome: Progressing  5/17/2024 0901 by KEVIN BROOKS  Outcome: Progressing  Goal: Achieves maximal functionality and self care  Description: INTERVENTIONS  - Monitor swallowing and airway patency with patient fatigue and changes in neurological status  - Encourage and assist patient to increase activity and self care.   - Encourage visually impaired, hearing impaired and aphasic patients to use assistive/communication devices  5/17/2024 0958 by KEVIN BROOKS  Outcome: Progressing  5/17/2024 0901 by KEVIN BROOKS  Outcome: Progressing     Problem: CARDIOVASCULAR - ADULT  Goal: Maintains optimal cardiac output and hemodynamic stability  Description: INTERVENTIONS:  - Monitor I/O, vital signs and rhythm  - Monitor for S/S and trends of decreased cardiac output  - Administer and titrate ordered vasoactive medications to optimize hemodynamic stability  - Assess quality of pulses, skin color and temperature  - Assess for signs of decreased coronary artery perfusion  - Instruct patient to report change in severity of symptoms  5/17/2024 0958 by KEVIN BROOKS  Outcome: Progressing  5/17/2024 0901 by KEVIN BROOKS  Outcome: Progressing  Goal: Absence of cardiac dysrhythmias or at baseline rhythm  Description: INTERVENTIONS:  - Continuous cardiac monitoring, vital signs, obtain 12 lead EKG if ordered  - Administer antiarrhythmic and heart rate control medications as ordered  - Monitor electrolytes and administer replacement therapy as ordered  5/17/2024 0958 by KEVIN BROOKS  Outcome: Progressing  5/17/2024 0901 by KEVIN BROOKS  Outcome: Progressing

## 2024-05-17 NOTE — ASSESSMENT & PLAN NOTE
73-year-old female who has no known PMH, but does not routinely see doctors, who presented as a stroke alert with left facial droop and left upper extremity weakness on 5/15 which with sudden symptom onset at 13:30 while she was having lunch. EMS reported that patient initially had flaccid paralysis of the left upper extremity; however, this had resolved by the time she presented to the ED. NIHSS 2 in the ED. CTH negative. CTA with distal R M1 occlusion w/ distal flow in M2 branches; severe stenosis of R P2.   Post-CT patient worsened with AMS, and LUE/LLE weakness, and thus TNK was administered. The patient was then transferred to hospitals for intervention and is now s/p successful TICI 3 mechanical thrombectomy.    Work-up:  CTH:   No acute intracranial abnormality. Mild chronic microangiopathy.  CTA H/N:   Occlusive thrombus in right MCA distal M1 bifurcation extending into proximal M2 superior and inferior divisions. Contrast opacification is noted beyond this occluded segment likely due to good collaterals. Severe stenosis in right PCA P2 segment.  Additional chronic/incidental findings as detailed above.  MRI brain:   Very small acute infarct in the right frontal lobe. No acute hemorrhage. Mild chronic microangiopathy  A1C 6.0  LDL 99    MRI with very small residual R frontal lobe infarct s/p TNK and successful thrombectomy with neuro exam unremarkable aside from minimal L proximal weakness which patient reports may have been present prior to stroke. Concern for central embolic etiology. Will likely need long term cardiac monitoring. Plan as detailed below.    Plan:  Was on TNK protocol - TNK given 5/15 1500  Repeat CTH 24 hours post TNK was negative  DAPT for 21 days  Continue ASA 81 mg daily for 21 days  Load Plavix and continue 75 mg daily indefinitely  Atorvastatin 40mg   Echo completed, unremarkable  Outpatient Cardiology follow-up with Zio patch vs Loop recorder  Monitored on Telemetry  Secondary stroke risk  factor modification  Goal normotension  Goal of euglycemia and normothermia  PT/OT/ST  Stroke Education  Cardiology follow up outpatient for zio patch/loop recorder  Neurology outpatient follow up  Internal medicine outpatient follow up for establishing care   2-4 times/mo

## 2024-05-17 NOTE — ASSESSMENT & PLAN NOTE
Patient has mild hydroureteronephrosis bilaterally  Unclear etiology, will likely need cystoscopy  No current issues urinating  Patient will require follow up with Urology

## 2024-05-17 NOTE — UTILIZATION REVIEW
SEE INITIAL REVIEW AT BOTTOM    Date: 5/17       Day 3: Has surpassed a 2nd midnight with active treatments and services.        Continued Stay Review    Date: 5/17                          Current Patient Class: INPT     Level of Care:   5/15 -  5/16   critical   5/16 - current   [facility]     HPI:73 y.o. female initially admitted on 5/15       5/16    Patient is doing well. Obvious deficits. bilateral hip flexion trace weakness 4+ appears position in bed is contributing. symptoms not one-sided. She does mention that at times she has some proximal left lower extremity weakness in the past. She has dilated left atrium on 2-d echo. Given that information and age, paroxysmal afib is high in the differential....  lower suspicion for hypercoagulable state. MRI brain is showing a small rt frontal lobe acute infarct suggesting large penumbra in the setting of the rt m1 thrombus and that most of the tissue was saved with TICI 3 mechanical thrombectomy results. Pt also had iv tnk and 24 hour post iv tnk head ct is pending. Pt will need xeopatch if no paf detected here.   On  telemetry - if the xeopatch is nonrevealing then will need a cardiac loop implant.       5/17   0133 -   gcs 15   0727 -   gcs 15    5/17       Assessment/Plan:       Vital Signs:   05/17/24 0727 -- -- -- -- -- -- None (Room air) --   05/16/24 22:36:36 99.1 °F (37.3 °C) 68 18 131/67 84 97 % -- --   05/16/24 17:29:34 98.2 °F (36.8 °C) 72 16 128/78 95 97 % None (Room air) Lying   05/16/24 1700 -- 64 38 Abnormal  -- -- 97 % None (Room air) Lying   05/16/24 1623 -- 60 26 Abnormal  145/78 107 96 % None (Room air) Lying   05/16/24 1600 98.7 °F (37.1 °C) 60 33 Abnormal  -- -- 97 % None (Room air) --   05/16/24 1502 -- 60 18 142/72 98 97 % None (Room air) Lying   05/16/24 1400 -- 74 30 Abnormal  162/79 108 96 % None (Room air) Lying   05/16/24 1300 -- 66 26 Abnormal  145/70 107 97 % None (Room air) Lying   05/16/24 1207 -- 64 19 132/75 98 97 % None (Room air)  Lying   05/16/24 1200 98.3 °F (36.8 °C) 70 39 Abnormal  -- -- 98 % None (Room air) --   05/16/24 1100 -- 70 36 Abnormal  121/78 94 97 % None (Room air) Lying   05/16/24 1000 -- 78 49 Abnormal  147/65 104 99 % None (Room air) Sitting   05/16/24 0900 -- 72 22 133/70 88 98 % None (Room air) Lying   05/16/24 0830 -- 70 -- 125/61 -- -- -- --   05/16/24 0800 98.3 °F (36.8 °C) 62 18 119/67 97 96 % None (Room air) Lying       Pertinent Labs/Diagnostic Results:     5/17  CTAP:     No findings of occult malignancy.   Marked laxity of the pelvic floor, with bladder and anorectal junction below the pubococcygeal line. Likely secondary mild hydroureteronephrosis.   Fibrotic changes in the infrahilar lower lobes.           Results from last 7 days   Lab Units 05/16/24  0622 05/15/24  1455   WBC Thousand/uL 7.59 7.34   HEMOGLOBIN g/dL 11.7 12.7   HEMATOCRIT % 35.2 38.1   PLATELETS Thousands/uL 272 299   TOTAL NEUT ABS Thousands/µL 4.00  --          Results from last 7 days   Lab Units 05/17/24  0511 05/16/24  0622 05/15/24  1455   SODIUM mmol/L 140 139 137   POTASSIUM mmol/L 4.1 4.9 3.8   CHLORIDE mmol/L 103 105 104   CO2 mmol/L 27 25 27   ANION GAP mmol/L 10 9 6   BUN mg/dL 20 22 23   CREATININE mg/dL 1.10 0.96 1.02   EGFR ml/min/1.73sq m 50 59 55   CALCIUM mg/dL 9.2 8.5 8.6   MAGNESIUM mg/dL  --  2.0  --    PHOSPHORUS mg/dL  --  3.8  --      Results from last 7 days   Lab Units 05/16/24 0622   AST U/L 27   ALT U/L 17   ALK PHOS U/L 59   TOTAL PROTEIN g/dL 6.3*   ALBUMIN g/dL 3.4*   TOTAL BILIRUBIN mg/dL 0.65         Results from last 7 days   Lab Units 05/17/24  0511 05/16/24  0622 05/15/24  1455   GLUCOSE RANDOM mg/dL 92 93 99         Results from last 7 days   Lab Units 05/15/24  1455   HEMOGLOBIN A1C % 6.0*   EAG mg/dl 126     Results from last 7 days   Lab Units 05/15/24  1455   PROTIME seconds 15.2*   INR  1.17   PTT seconds 28     Medications:   Scheduled Medications:  aspirin, 81 mg, Oral, Daily  atorvastatin, 40 mg,  Oral, Daily With Dinner  clopidogrel, 300 mg, Oral, Once   Followed by  [START ON 5/18/2024] clopidogrel, 75 mg, Oral, Daily      Continuous IV Infusions:     PRN Meds:  hydrALAZINE, 5 mg, Intravenous, Q6H PRN  labetalol, 10 mg, Intravenous, Q6H PRN        Discharge Plan: tbd    Network Utilization Review Department  ATTENTION: Please call with any questions or concerns to [phone] and carefully listen to the prompts so that you are directed to the right person. All voicemails are confidential.   For Discharge needs, contact Care Management DC Support Team at [phone] opt. 2  Send all requests for admission clinical reviews, approved or denied determinations and any other requests to dedicated fax number below belonging to the campus where the patient is receiving treatment. List of dedicated fax numbers for the Facilities:  FACILITY NAME UR FAX NUMBER   ADMISSION DENIALS (Administrative/Medical Necessity) [phone]   DISCHARGE SUPPORT TEAM (NETWORK) [phone]   PARENT CHILD HEALTH (Maternity/NICU/Pediatrics) [phone]   [facility] [phone]   [facility] [phone]   [facility] [phone]   [facility] [phone]   [facility] [phone]   [facility] [phone]   [facility] [phone]   [facility] [phone]   [facility] [phone]   [facility] [phone]   [facility] [phone]   [facility] [phone]

## 2024-05-17 NOTE — DISCHARGE SUMMARY
NEUROLOGY RESIDENCY - DISCHARGE SUMMARY     Name: Radha Dodson   Age & Sex: 73 y.o. female   MRN: [id number]  [facility]   Encounter: [id number]    Discharging Resident Physician: John Hunt MD  Attending: Kyle Hahn MD  PCP: No primary care provider on file.  Admission Date: 5/15/2024  Discharge Date: 05/17/24    Radha Dodson will need follow up in in 6 weeks with general Neurology attending/AP/Resident .  She will not require outpatient neurological testing.  Will require outpatient ZIO patch/loop recorder.    ASSESSMENT & PLAN     * Acute ischemic right MCA stroke (HCC)  Assessment & Plan  73-year-old female who has no known PMH, but does not routinely see doctors, who presented as a stroke alert with left facial droop and left upper extremity weakness on 5/15 which with sudden symptom onset at 13:30 while she was having lunch. EMS reported that patient initially had flaccid paralysis of the left upper extremity; however, this had resolved by the time she presented to the ED. NIHSS 2 in the ED. CTH negative. CTA with distal R M1 occlusion w/ distal flow in M2 branches; severe stenosis of R P2.   Post-CT patient worsened with AMS, and LUE/LLE weakness, and thus TNK was administered. The patient was then transferred to [facility] for intervention and is now s/p successful TICI 3 mechanical thrombectomy.    Work-up:  CTH:   No acute intracranial abnormality. Mild chronic microangiopathy.  CTA H/N:   Occlusive thrombus in right MCA distal M1 bifurcation extending into proximal M2 superior and inferior divisions. Contrast opacification is noted beyond this occluded segment likely due to good collaterals. Severe stenosis in right PCA P2 segment.  Additional chronic/incidental findings as detailed above.  MRI brain:   Very small acute infarct in the right frontal lobe. No acute hemorrhage. Mild chronic microangiopathy  A1C 6.0  LDL 99    MRI with very small residual R frontal lobe infarct s/p  TNK and successful thrombectomy with neuro exam unremarkable aside from minimal L proximal weakness which patient reports may have been present prior to stroke. Concern for central embolic etiology. Will likely need long term cardiac monitoring. Plan as detailed below.    Plan:  Was on TNK protocol - TNK given 5/15 1500  Repeat CTH 24 hours post TNK was negative  DAPT for 21 days  Continue ASA 81 mg daily for 21 days  Load Plavix and continue 75 mg daily indefinitely  Atorvastatin 40mg   Echo completed, unremarkable  Outpatient Cardiology follow-up with Zio patch vs Loop recorder  Monitored on Telemetry  Secondary stroke risk factor modification  Goal normotension  Goal of euglycemia and normothermia  PT/OT/ST  Stroke Education  Cardiology follow up outpatient for zio patch/loop recorder  Neurology outpatient follow up  Internal medicine outpatient follow up for establishing care    Hydroureteronephrosis  Assessment & Plan  Patient has mild hydroureteronephrosis bilaterally  Unclear etiology, will likely need cystoscopy  No current issues urinating, UA unremarkable  Patient will require follow up with Urology    Prediabetes  Assessment & Plan  Hemoglobin A1c 6.0 consistent with prediabetes  Follow-up with primary care provider  Diet modifications and lifestyle modification with increased physical activity             Disposition:     Home    Reason for Admission: Stroke    Consultations During Hospital Stay:  IP CONSULT TO CASE MANAGEMENT  IP CONSULT TO NEUROLOGY  IP CONSULT TO NEUROSURGERY    Procedures Performed:   Thrombectomy    Significant Findings / Test Results:   Labs: Hemoglobin A1c 6.0 (5/15/2024), LDL 99 (5/15/2024)    CT chest abdomen pelvis w contrast    Result Date: 5/17/2024  Impression: No findings of occult malignancy. Marked laxity of the pelvic floor, with bladder and anorectal junction below the pubococcygeal line. Likely secondary mild hydroureteronephrosis. Fibrotic changes in the infrahilar  lower lobes. Other chronic findings, as per the body of the report. Workstation performed: [id number]     IR stroke alert    Result Date: 5/16/2024  Impression: Right MCA M1 occlusion, TICI 0. Successful mechanical thrombectomy with TICI 3 reperfusion. Workstation performed: [id number]     CT head wo contrast    Result Date: 5/16/2024  Impression: No acute infarct identified. Tiny right frontal cortical infarct identified on yesterday's MRI is not well seen. No acute hemorrhage or mass effect. Stable chronic microangiopathy. Workstation performed: [id number]     MRI brain wo contrast    Result Date: 5/16/2024  Impression: Very small acute infarct in the right frontal lobe. No acute hemorrhage Mild chronic microangiopathy Workstation performed: [id number]     CTA stroke alert (head/neck)    Result Date: 5/15/2024  Impression: Occlusive thrombus in right MCA distal M1 bifurcation extending into proximal M2 superior and inferior divisions. Contrast opacification is noted beyond this occluded segment likely due to good collaterals. Severe stenosis in right PCA P2 segment. Additional chronic/incidental findings as detailed above. Findings were directly discussed with Aram Bautista at approximately 2:48 PM. Workstation performed: [id number]     CT stroke alert brain    Result Date: 5/15/2024  Impression: No acute intracranial abnormality. Mild chronic microangiopathy. Findings were directly discussed with Aram Bautista at approximately 2:48 PM. Workstation performed: [id number]       Incidental Findings:   None     Test Results Pending at Discharge (will require follow up):   None     Outpatient Tests Requested:  Zio patch, cardiology follow up    Complications:  None    Hospital Course:     Radha Dodson is a 73 y.o. female patient with no PMH since patient does not regularly see doctors, who originally presented to the hospital on 5/15/2024 after being transferred from [facility].  Patient went to [facility] the ED first because of  left facial droop and upper extremity weakness, which was resolved before getting to the ED, but then after CAT scan she had recurrence of the symptoms with AMS, left upper extremity weakness and left facial droop for which she received TNK and was transferred to [facility] for thrombectomy which was successfully done.  On imaging patient was found to have distal right MCA M1 occlusion.  She was monitored in the ICU and 24-hour CAT scan was unremarkable after which she was transferred to neurology service on a regular floor.  Patient's heart rhythm has been in sinus.  She will continue taking aspirin and statin.  She was found to have prediabetes with hemoglobin A1c 6.0.  Also CAT scan of the chest abdomen pelvis was done to see if there is any malignant process but no evidence was found.  There was marked laxity of the pelvic floor with the bladder and and rectal junction below the pubococcygeal line likely secondary to mild hydroureteronephrosis. Patient will need to a establish care and follow up with PCP for these aforementioned findings. Patient is getting discharged on ASA for total of 21 days, and will continue Plavix indefinitely.    Condition at Discharge: good     Discharge Day Visit / Exam:     Subjective:  Patient feels well, denies any issues and feels well. Denied headache, blurry vision, seizures, changes in mentation, is alert and fully oriented.     Vitals: Blood Pressure: 147/80 (05/17/24 1510)  Pulse: 67 (05/17/24 1510)  Temperature: 98.1 °F (36.7 °C) (05/17/24 1510)  Temp Source: Oral (05/16/24 1729)  Respirations: 18 (05/16/24 2236)  Height: 5' (152.4 cm) (05/16/24 0830)  Weight - Scale: 66.2 kg (146 lb) (05/16/24 0830)  SpO2: 95 % (05/17/24 1510)    Exam:     Physical Exam  Vitals and nursing note reviewed.   Constitutional:       General: She is not in acute distress.     Appearance: She is well-developed.   HENT:      Head: Normocephalic and atraumatic.   Eyes:      Extraocular Movements: EOM  normal.      Conjunctiva/sclera: Conjunctivae normal.      Pupils: Pupils are equal, round, and reactive to light.   Cardiovascular:      Rate and Rhythm: Normal rate and regular rhythm.      Heart sounds: No murmur heard.  Pulmonary:      Effort: Pulmonary effort is normal. No respiratory distress.      Breath sounds: Normal breath sounds.   Abdominal:      Palpations: Abdomen is soft.      Tenderness: There is no abdominal tenderness.   Musculoskeletal:         General: No swelling.      Cervical back: Neck supple.   Skin:     General: Skin is warm and dry.      Capillary Refill: Capillary refill takes less than 2 seconds.   Neurological:      Mental Status: She is alert and oriented to person, place, and time.      Motor: Motor strength is normal.     Gait: Gait is intact.   Psychiatric:         Mood and Affect: Mood normal.         Speech: Speech normal.         Neurologic Exam     Mental Status   Oriented to person, place, and time.   Attention: normal. Concentration: normal.   Speech: speech is normal   Level of consciousness: alert  Knowledge: good.     Cranial Nerves     CN II   Visual fields full to confrontation.     CN III, IV, VI   Pupils are equal, round, and reactive to light.  Extraocular motions are normal.     CN V   Facial sensation intact.     CN VII   Facial expression full, symmetric.     CN VIII   Hearing impaired: Impaired chronically.    CN IX, X   CN IX normal.   CN X normal.     CN XI   CN XI normal.     CN XII   CN XII normal.     Motor Exam   Muscle bulk: normal  Overall muscle tone: normal    Strength   Strength 5/5 throughout.     Sensory Exam   Light touch normal.     Gait, Coordination, and Reflexes     Gait  Gait: normal    Tremor   Resting tremor: absent  Intention tremor: absent  Action tremor: absent      Discussion with Family: Patient declined call to family.    Discharge instructions/Information to patient and family:   See after visit summary for information provided to  patient and family.      Provisions for Follow-Up Care:  See after visit summary for information related to follow-up care and any pertinent home health orders.      Planned Readmission: None      Discharge Medications:  See after visit summary for reconciled discharge medications provided to patient and family.      ** Please Note: This note has been constructed using a voice recognition system **      ======    I have discussed the patient's history, physical exam findings, assessment, and plan in detail with attending, Dr. Hahn    Thank you for allowing me to participate in the care of your patient, Radha Dodson.    John Hunt MD  [facility] Internal Medicine Residency, PGY-3  Neurology rotation

## 2024-05-17 NOTE — ASSESSMENT & PLAN NOTE
Patient has mild hydroureteronephrosis bilaterally  Unclear etiology, will likely need cystoscopy  No current issues urinating, UA unremarkable  Patient will require follow up with Urology

## 2024-05-17 NOTE — PLAN OF CARE
Problem: Neurological Deficit  Goal: Neurological status is stable or improving  Description: Interventions:  - Monitor and assess patient's level of consciousness, motor function, sensory function, and level of assistance needed for ADLs.   - Monitor and report changes from baseline. Collaborate with interdisciplinary team to initiate plan and implement interventions as ordered.   - Provide and maintain a safe environment.  - Consider seizure precautions.  - Consider fall precautions.  - Consider aspiration precautions.  - Consider bleeding precautions.  Outcome: Progressing     Problem: Potential for Aspiration  Goal: Non-ventilated patient's risk of aspiration is minimized  Description: Assess and monitor vital signs, respiratory status, and labs (WBC).  Monitor for signs of aspiration (tachypnea, cough, rales, wheezing, cyanosis, fever).    - Assess and monitor patient's ability to swallow.  - Place patient up in chair to eat if possible.  - HOB up at 90 degrees to eat if unable to get patient up into chair.  - Supervise patient during oral intake.   - Instruct patient/ family to take small bites.  - Instruct patient/ family to take small single sips when taking liquids.  - Follow patient-specific strategies generated by speech pathologist.  Outcome: Progressing

## 2024-05-17 NOTE — CASE MANAGEMENT
Case Management Discharge Planning Note    Patient name Radha Postic  Location [facility] 703/[facility] 703-01 MRN [id number]  : 1950 Date 2024       Current Admission Date: 5/15/2024  Current Admission Diagnosis:Acute ischemic right MCA stroke (HCC)   Patient Active Problem List    Diagnosis Date Noted Date Diagnosed    Prediabetes 2024     Hydroureteronephrosis 2024     Acute ischemic right MCA stroke (HCC) 2024       LOS (days): 2  Geometric Mean LOS (GMLOS) (days): 4  Days to GMLOS:2.2     OBJECTIVE:  Risk of Unplanned Readmission Score: 7.74         Current admission status: Inpatient   Preferred Pharmacy:   [id number] [place], [place] - [address] [place] [id number]  Phone: [phone] Fax: [phone]    Primary Care Provider: No primary care provider on file.    Primary Insurance: [facility]  Secondary Insurance:     DISCHARGE DETAILS:    Discharge planning discussed with:: Patient  Freedom of Choice: Yes  Comments - Freedom of Choice: Pt left CM a voicemail. Pt was concerned about having transportation home. CM explained a Lyft could be arranged. Patient also concerned that she will not have transportation to future doctor's appointments in [place]. CM called [employer] [facility] they no loner have transportation to [place] or the [facility] unless heidi patient is on Medical Assistance. CM called [address] [place] [address] Neurology office spoke to staff who suggested CM reach out to Arleth Campos Nurse Navigator. CM sent a message through Teams to Kaveh Reinoso will arrange a  after the patient is discharged to assist with transportation. CM discussed with the patient that there is a Primary Care office in [place] and Cardiology has an office in [place]. Neurology is the only provider the patient will need assistance with follow up visits for. CM also gave the patient a  P[employer] application if she needs Assistance with  Prescription Drug Coverage. Patient did say that there is a possiblity that family might be able to transort if her discharge order is written before the famly gets to the hospital.  CM contacted family/caregiver?: Yes  Were Treatment Team discharge recommendations reviewed with patient/caregiver?: Yes  Did patient/caregiver verbalize understanding of patient care needs?: Yes  Were patient/caregiver advised of the risks associated with not following Treatment Team discharge recommendations?: Yes         Requested Home Health Care         Is the patient interested in HHC at discharge?: No    DME Referral Provided  Referral made for DME?: No    Other Referral/Resources/Interventions Provided:  Interventions: Other (Specify), Transportation to treatment         Treatment Team Recommendation: Home  Discharge Destination Plan:: Home

## 2024-05-17 NOTE — ASSESSMENT & PLAN NOTE
Hemoglobin A1c 6.0 consistent with prediabetes  Follow-up with primary care provider  Diet modifications and lifestyle modification with increased physical activity

## 2024-05-17 NOTE — DISCHARGE INSTR - AVS FIRST PAGE
Dear Radha Dodson,     It was our pleasure to care for you here at [facility].  It is our hope that we were always able to exceed the expected standards for your care during your stay.  You were hospitalized due to Stroke.  [facility] John Hunt MD under the service of Kyle Hahn MD with the [facility], while you were hospitalized. For follow up as well as any medication refills, we recommend that you follow up with your primary care physician. However, at this time we provide for you here, the most important instructions / recommendations at discharge:     Notable Medication Adjustments -   Please take taking Plavix 75 mgdaily indefinitely  Please start taking Plavix daily for 20 days until 6/7/2024  Please start taking Atorvastatin 40 mg daily indefinitely  Testing Required after Discharge -   None  ** Please contact your PCP to request testing orders for any of the testing recommended here **  Important follow up information -   Please establish care with PCP  Please follow up with Neurology outpatient in the office for your stroke  Please follow up with Urology since your CT scan of the abdomen showed some mild distention of both kidneys. You also had simple cysts in kidneys which is most likely not concerning  Please follow up with Cardiology to get a Zio-patch/Loop recorder (a heart rhythm monitor) to make sure you don't have any abnormal heart rhythm  Other Instructions -   Please check blood pressure at home and follow up with PCP since you might require blood pressure medication going forward if it stays high  Please follow up regarding your HgbA1c (blood test), which was 6.0 and means that you have a risk of developing diabetes. Please read prediabetes clinical references included in your paperwork  Please review this entire after visit summary as additional general instructions including medication list,  appointments, activity, diet, any pertinent wound care, and other additional recommendations from your care team that may be provided for you.      Sincerely,     John Hunt MD

## 2024-05-17 NOTE — RESTORATIVE TECHNICIAN NOTE
Restorative Technician Note      Patient Name: Radha Dodson     Note Type: Mobility  Patient Position Upon Consult: Supine  Activity Performed: Ambulated; Dangled; Stood  Assistive Device: Other (Comment) (none)  Education Provided: Yes  Patient Position at End of Consult: All needs within reach; Supine; Bed/Chair alarm activated  Danielle ZUÑIGA, Restorative Technician,

## 2024-05-17 NOTE — PROGRESS NOTES
"Met with patient at bedside in room 703. Introduced my role. Provided stroke education including stroke education booklet and magnet. Reviewed stroke-like symptoms. Reviewed stroke risk factors.     Per inpatient neurology notes:  \"Radha Dodson will need follow up in in 4 weeks with neurovascular attending or advance practitioner. She will not require outpatient neurological testing.\"    Offered to schedule stroke HFU, patient is agreeable to this. Scheduled appointment. Provided appointment details including the office's phone number. Call bell placed within reach. Patient denies any questions or concerns at this time. She was appreciative for the call.   "

## 2024-05-20 ENCOUNTER — TELEPHONE (OUTPATIENT)
Dept: NEUROLOGY | Facility: CLINIC | Age: 74
End: 2024-05-20

## 2024-05-20 NOTE — UTILIZATION REVIEW
NOTIFICATION OF ADMISSION DISCHARGE   This is a Notification of Discharge from [facility]. Please be advised that this patient has been discharge from our facility. Below you will find the admission and discharge date and time including the patient’s disposition.   UTILIZATION REVIEW CONTACT:  Abril Andersen  Utilization   Network Utilization Review Department  Phone: [phone] x carefully listen to the prompts. All voicemails are confidential.  Email: [email or web address]     ADMISSION INFORMATION  PRESENTATION DATE: 5/15/2024  4:33 PM  OBERVATION ADMISSION DATE:   INPATIENT ADMISSION DATE: 5/15/24  5:49 PM   DISCHARGE DATE: 5/17/2024  6:16 PM   DISPOSITION:Home/Self Care    Network Utilization Review Department  ATTENTION: Please call with any questions or concerns to [phone] and carefully listen to the prompts so that you are directed to the right person. All voicemails are confidential.   For Discharge needs, contact Care Management DC Support Team at [phone] opt. 2  Send all requests for admission clinical reviews, approved or denied determinations and any other requests to dedicated fax number below belonging to the campus where the patient is receiving treatment. List of dedicated fax numbers for the Facilities:  FACILITY NAME UR FAX NUMBER   ADMISSION DENIALS (Administrative/Medical Necessity) [phone]   DISCHARGE SUPPORT TEAM ([facility]) [phone]   PARENT CHILD HEALTH (Maternity/NICU/Pediatrics) [phone]   [facility] [phone]   [facility] [phone]   [facility] [phone]   [facility] [phone]   [facility] [phone]   [facility] [phone]   [facility] [phone]   [facility] [phone]    [facility] [phone]   [facility] [phone]   [facility] [phone]   [facility] [phone]

## 2024-05-20 NOTE — TELEPHONE ENCOUNTER
Pebbles Booth, RN with care coordination messaged this RN stating the patient is going to need assistance to obtain transportation to the neurology HFU on 6/17/24. Will send a message to the neurology social work team for assistance.

## 2024-05-20 NOTE — TELEPHONE ENCOUNTER
MSW phoned pt and she confirmed that she has [facility]. Pt provided provider services number [phone]. Pt agreeable for this writer to inquire about transportation benefits.     MSW phoned [facility] and spoke with Gilberto who informed by rep that pt is     Active with [facility]   40 one way rides (20 round trips)  Up to 60 miles radious    Access to care   [phone]    Reference [id number]        MSW phoned pt and provided information about transportation services with insurance. Pt will be contacting access to care to schedule future transportation to medical appts.

## 2024-05-21 LAB — GLUCOSE SERPL-MCNC: 131 MG/DL (ref 65–140)

## 2024-05-22 ENCOUNTER — TRANSITIONAL CARE MANAGEMENT (OUTPATIENT)
Dept: FAMILY MEDICINE CLINIC | Facility: CLINIC | Age: 74
End: 2024-05-22

## 2024-05-24 ENCOUNTER — OFFICE VISIT (OUTPATIENT)
Dept: FAMILY MEDICINE CLINIC | Facility: CLINIC | Age: 74
End: 2024-05-24
Payer: COMMERCIAL

## 2024-05-24 ENCOUNTER — TELEPHONE (OUTPATIENT)
Dept: NEUROLOGY | Facility: CLINIC | Age: 74
End: 2024-05-24

## 2024-05-24 VITALS
SYSTOLIC BLOOD PRESSURE: 112 MMHG | HEIGHT: 60 IN | HEART RATE: 92 BPM | BODY MASS INDEX: 28.66 KG/M2 | OXYGEN SATURATION: 94 % | DIASTOLIC BLOOD PRESSURE: 62 MMHG | WEIGHT: 146 LBS

## 2024-05-24 DIAGNOSIS — I63.511 ACUTE ISCHEMIC RIGHT MCA STROKE (HCC): Primary | ICD-10-CM

## 2024-05-24 DIAGNOSIS — R73.03 PREDIABETES: ICD-10-CM

## 2024-05-24 DIAGNOSIS — N13.30 HYDROURETERONEPHROSIS: ICD-10-CM

## 2024-05-24 PROCEDURE — 99496 TRANSJ CARE MGMT HIGH F2F 7D: CPT | Performed by: FAMILY MEDICINE

## 2024-05-24 RX ORDER — CLOPIDOGREL BISULFATE 75 MG/1
75 TABLET ORAL DAILY
Qty: 30 TABLET | Refills: 5 | Status: SHIPPED | OUTPATIENT
Start: 2024-05-24

## 2024-05-24 NOTE — PROGRESS NOTES
Transition of Care Visit  Name: Radha Dodson      : 1950      MRN: [id number]  Encounter Provider: Ketan Mahmood MD  Encounter Date: 2024   Encounter department: [facility] PRIMARY [facility]ARE    Assessment & Plan   1. Acute ischemic right MCA stroke (HCC)  Assessment & Plan:  Discussed problem.  Apparently has had complete resolution of symptoms and done very well.  Reviewed hospital report and reconciled medication.  Is going to follow-up with neurology but will also need to set up for cardiology.  May stop the aspirin after 2 weeks  Orders:  -     Ambulatory Referral to Cardiology; Future; Expected date: 2024  -     clopidogrel (PLAVIX) 75 mg tablet; Take 1 tablet (75 mg total) by mouth daily  2. Hydroureteronephrosis  Assessment & Plan:  Discussed problem.  Will set up with urology  Orders:  -     Ambulatory Referral to Urology; Future; Expected date: 2024  3. Prediabetes  Assessment & Plan:  Would just recommend watching sweets and starch in diet at this time      Depression Screening and Follow-up Plan: Patient was screened for depression during today's encounter. They screened negative with a PHQ-2 score of 0.        History of Present Illness     Transitional Care Management Review:   Radha Dodson is a 73 y.o. female here for TCM follow up.     During the TCM phone call patient stated:  TCM Call       Type Value    Date and time call was made  2024  3:41 PM    Hospital care reviewed  Records reviewed    Patient was hospitialized at  [facility]    Date of Admission  05/15/24    Date of discharge  24    Diagnosis  Acute ischemic right MCA stroke    Disposition  Home    Were the patients medications reviewed and updated  No    Current Symptoms  None          TCM Call       Type Value    Post hospital issues  None    Scheduled for follow up?  Yes    Patients specialists  Cardiologist    Did you obtain your prescribed medications  Yes    Do  you need help managing your prescriptions or medications  No    Is transportation to your appointment needed  Yes    I have advised the patient to call PCP with any new or worsening symptoms  Emilia Ventura, [facility]    Support System  Family          Patient seen for first office check.  Had been feeling well and developed left-sided weakness and was hospitalized with stroke and the right middle cerebral artery.  This was embolic in nature and the clot was removed and has had complete resolution of symptoms.  Was found to have hydronephrosis in the hospital and also question about possible prediabetes.  It was recommended for follow-up with urology and cardiology.  Does have appointment with neurology is currently taking aspirin, Plavix and atorvastatin.  Was told to stop the aspirin after 3 weeks.  Seen today for transition of care      Review of Systems   Constitutional:  Negative for activity change, appetite change, chills, fatigue, fever and unexpected weight change.   HENT:  Negative for congestion, rhinorrhea, trouble swallowing and voice change.    Eyes:  Negative for visual disturbance.   Respiratory:  Negative for apnea, cough, chest tightness and shortness of breath.    Cardiovascular:  Negative for chest pain, palpitations and leg swelling.   Gastrointestinal:  Negative for abdominal distention, abdominal pain, constipation and diarrhea.   Endocrine: Negative for polyuria (Nocturia x 1).   Genitourinary:  Positive for enuresis.   Musculoskeletal:  Negative for arthralgias.   Skin:  Negative for rash.   Allergic/Immunologic: Negative for environmental allergies.   Neurological:  Positive for weakness. Negative for dizziness, speech difficulty, light-headedness, numbness and headaches.   Hematological:  Negative for adenopathy.   Psychiatric/Behavioral:  Negative for agitation, confusion and sleep disturbance.      Objective     Blood Pressure 112/62 (BP Location: Left arm, Patient Position: Sitting, Cuff Size:  Standard)   Pulse 92   Height 5' (1.524 m)   Weight 66.2 kg (146 lb)   Oxygen Saturation 94%   Body Mass Index 28.51 kg/m²     Physical Exam  Vitals and nursing note reviewed.   Constitutional:       Appearance: Normal appearance. She is not ill-appearing.   HENT:      Head: Normocephalic.      Right Ear: Tympanic membrane, ear canal and external ear normal.      Left Ear: Tympanic membrane, ear canal and external ear normal.      Nose: Nose normal.      Mouth/Throat:      Mouth: Mucous membranes are moist.   Eyes:      Extraocular Movements: Extraocular movements intact.      Conjunctiva/sclera: Conjunctivae normal.      Pupils: Pupils are equal, round, and reactive to light.   Neck:      Vascular: No carotid bruit.   Cardiovascular:      Rate and Rhythm: Normal rate. Rhythm irregular.      Pulses: Normal pulses.      Heart sounds: Normal heart sounds. No murmur (Rate is 78) heard.  Pulmonary:      Effort: Pulmonary effort is normal.      Breath sounds: Normal breath sounds.   Abdominal:      General: Abdomen is flat. There is no distension.      Palpations: Abdomen is soft. There is no mass.      Tenderness: There is no abdominal tenderness.   Musculoskeletal:         General: No swelling.      Cervical back: Normal range of motion and neck supple. No tenderness.      Right lower leg: No edema.      Left lower leg: No edema.   Lymphadenopathy:      Cervical: No cervical adenopathy.   Skin:     General: Skin is warm and dry.      Findings: No rash.   Neurological:      General: No focal deficit present.      Mental Status: She is alert and oriented to person, place, and time.      Cranial Nerves: No cranial nerve deficit.      Sensory: No sensory deficit.      Motor: No weakness.      Coordination: Coordination normal.      Gait: Gait normal.      Deep Tendon Reflexes: Reflexes normal.   Psychiatric:         Mood and Affect: Mood normal.         Behavior: Behavior normal.         Thought Content: Thought  content normal.         Judgment: Judgment normal.       Medications have been reviewed by provider in current encounter    Administrative Statements

## 2024-05-24 NOTE — PATIENT INSTRUCTIONS
Overall seems to be doing remarkably well after the stroke and does not notice any left over problems.  Will set up with urology for the hydronephrosis and also with cardiology as is probably going to need extended monitoring as the heart is always suspicious as a source for the embolus.  Is due to see neurology.  May stop the aspirin after 2 more weeks but should continue on the Plavix and the Lipitor

## 2024-05-24 NOTE — TELEPHONE ENCOUNTER
Post CVA Discharge Follow Up  Hospitalization: 5/15/24-5/17/23    Called patient to obtain an update. Patient did not answer. Left a voice message requesting for a return call. Provided the office's phone number.

## 2024-05-24 NOTE — TELEPHONE ENCOUNTER
Post CVA Discharge Follow Up  Hospitalization: 5/15/24-5/17/24    Called patient to obtain an update. Patient reports she saw her PCP today and the PCP believes she is doing well. She is at work right now. Offered to call her back this Wednesday when this RN returns to the office. Patient is agreeable to this. She was appreciative.

## 2024-05-24 NOTE — ASSESSMENT & PLAN NOTE
Discussed problem.  Apparently has had complete resolution of symptoms and done very well.  Reviewed hospital report and reconciled medication.  Is going to follow-up with neurology but will also need to set up for cardiology.  May stop the aspirin after 2 weeks

## 2024-05-29 NOTE — TELEPHONE ENCOUNTER
Post CVA Discharge Follow Up  Hospitalization: 5/15/24-5/17/24    Called patient as previously planned to obtain an update. Patient did not answer. Left a voice message requesting for a return call. Provided the office's phone number.

## 2024-05-31 NOTE — TELEPHONE ENCOUNTER
Post CVA Discharge Follow Up  Hospitalization: 5/15/24-5/17/23    Called patient to obtain an update. Patient did not answer . Left a voice message requesting for a return call. Provided the office's phone number.

## 2024-06-10 ENCOUNTER — TELEPHONE (OUTPATIENT)
Age: 74
End: 2024-06-10

## 2024-06-10 NOTE — TELEPHONE ENCOUNTER
Patient contacted the office this afternoon stating that she is scheduled for a cleaning with her dentist on 06/20/24. She is currently taking Plavix and is asking if she needs to do anything prior to her dental visit because of being on this medication. Please advise.

## 2024-06-11 NOTE — TELEPHONE ENCOUNTER
MSW phoned [facility] at [phone] and spoke with Robin who informed that pt schedule her transportation to neuro upcoming appt 6/17    They will  patient around 915am-930am   Once patient completes appointment she will have to call them directly for them to send a

## 2024-06-14 ENCOUNTER — OFFICE VISIT (OUTPATIENT)
Dept: CARDIOLOGY CLINIC | Facility: CLINIC | Age: 74
End: 2024-06-14
Payer: COMMERCIAL

## 2024-06-14 VITALS
SYSTOLIC BLOOD PRESSURE: 144 MMHG | DIASTOLIC BLOOD PRESSURE: 80 MMHG | WEIGHT: 145.2 LBS | HEIGHT: 60 IN | HEART RATE: 73 BPM | BODY MASS INDEX: 28.51 KG/M2 | OXYGEN SATURATION: 98 %

## 2024-06-14 DIAGNOSIS — E78.1 HYPERTRIGLYCERIDEMIA: ICD-10-CM

## 2024-06-14 DIAGNOSIS — R73.03 PREDIABETES: ICD-10-CM

## 2024-06-14 DIAGNOSIS — I63.511 ACUTE ISCHEMIC RIGHT MCA STROKE (HCC): Primary | ICD-10-CM

## 2024-06-14 DIAGNOSIS — R00.2 PALPITATIONS: ICD-10-CM

## 2024-06-14 PROCEDURE — 99204 OFFICE O/P NEW MOD 45 MIN: CPT | Performed by: INTERNAL MEDICINE

## 2024-06-14 NOTE — ASSESSMENT & PLAN NOTE
2-week ZIO monitor to rule out any atrial fibrillation.  If no atrial fibrillation is documented, she will be scheduled for an implantable loop recorder.

## 2024-06-14 NOTE — ASSESSMENT & PLAN NOTE
Recent labs showed LDL of less than 100 mg/dL with slightly elevated triglycerides.  This is likely due to her prediabetes.  We discussed dietary modification for that.  Once her atrial fibrillation evaluation is complete, we may reduce her statin dose

## 2024-06-14 NOTE — ASSESSMENT & PLAN NOTE
No obvious etiology was found for her recent embolic stroke.  However the clinical history is suggestive of possible previously undiagnosed atrial fibrillation.  We will proceed with a 2-week ZIO monitor and if it shows no atrial fibrillation, she will be scheduled for a implantable loop recorder in view of high probability of atrial fibrillation.  Echocardiogram during recent hospitalization in May 2024 showed normal sinus rhythm with no other significant valvular pathology and no evidence of intracardiac shunting.

## 2024-06-14 NOTE — PROGRESS NOTES
[facility]  [address]  [address]  [facility] [id number] [phone]      Patient name: Radha Dodson   YOB: 1950   MR no: [id number]      Diagnosis ICD-10-CM Associated Orders   1. Acute ischemic right MCA stroke (HCC)  I63.511 Ambulatory Referral to Cardiology     AMB extended holter monitor     Ambulatory referral to Cardiac Electrophysiology      2. Palpitations  R00.2 Ambulatory referral to Cardiac Electrophysiology      3. Prediabetes  R73.03       4. Hypertriglyceridemia  E78.1         ASSESSMENT AND RECOMMENDATIONS:  1. Acute ischemic right MCA stroke (HCC)  Assessment & Plan:  No obvious etiology was found for her recent embolic stroke.  However the clinical history is suggestive of possible previously undiagnosed atrial fibrillation.  We will proceed with a 2-week ZIO monitor and if it shows no atrial fibrillation, she will be scheduled for a implantable loop recorder in view of high probability of atrial fibrillation.  Echocardiogram during recent hospitalization in May 2024 showed normal sinus rhythm with no other significant valvular pathology and no evidence of intracardiac shunting.  Orders:  -     Ambulatory Referral to Cardiology  -     AMB extended holter monitor; Future; Expected date: 06/14/2024  -     Ambulatory referral to Cardiac Electrophysiology; Future; Expected date: 07/15/2024  2. Palpitations  Assessment & Plan:  2-week ZIO monitor to rule out any atrial fibrillation.  If no atrial fibrillation is documented, she will be scheduled for an implantable loop recorder.  Orders:  -     Ambulatory referral to Cardiac Electrophysiology; Future; Expected date: 07/15/2024  3. Prediabetes  Assessment & Plan:  Patient was advised regarding dietary modification for prediabetes.  4. Hypertriglyceridemia  Assessment & Plan:  Recent labs showed LDL of less than 100 mg/dL with slightly elevated triglycerides.  This is  likely due to her prediabetes.  We discussed dietary modification for that.  Once her atrial fibrillation evaluation is complete, we may reduce her statin dose       CHIEF COMPLAINT:  Stroke    HPI:  73-year-old female with no significant past medical history who was recently hospitalized in May 2024 with an acute right MCA stroke with left sided hemiparesis and left facial droop.  Because of worsening of symptoms, she received TNK and mechanical thrombectomy with excellent recovery.  She was referred to cardiology as an outpatient to rule out atrial fibrillation as a possible cause of her recent stroke.  She is doing quite well since her hospitalization and is quite active without any chest pain, dyspnea exertion or syncope.  However she does admit that she has been noticing very brief infrequent fluttering in her chest for the last year or so but never thought much about it.    History reviewed. No pertinent past medical history.     Past Surgical History:   Procedure Laterality Date    IR STROKE ALERT  5/15/2024        Social History     Tobacco Use    Smoking status: Never    Smokeless tobacco: Never   Vaping Use    Vaping status: Never Used   Substance Use Topics    Alcohol use: Not Currently    Drug use: Never       Family history: Unremarkable    No Known Allergies      Current Outpatient Medications:     atorvastatin (LIPITOR) 40 mg tablet, Take 1 tablet (40 mg total) by mouth daily with dinner, Disp: 30 tablet, Rfl: 5    clopidogrel (PLAVIX) 75 mg tablet, Take 1 tablet (75 mg total) by mouth daily, Disp: 30 tablet, Rfl: 5    Multiple Vitamin (multivitamin) tablet, Take 1 tablet by mouth daily, Disp: , Rfl:     aspirin 81 mg chewable tablet, Chew 1 tablet (81 mg total) daily for 20 days Do not start before May 18, 2024. (Patient not taking: Reported on 6/14/2024), Disp: 20 tablet, Rfl: 0     Lab Results   Component Value Date    CREATININE 1.10 05/17/2024    CREATININE 0.96 05/16/2024    CREATININE 1.02  05/15/2024    K 4.1 05/17/2024    K 4.9 05/16/2024    K 3.8 05/15/2024    SODIUM 140 05/17/2024    SODIUM 139 05/16/2024    SODIUM 137 05/15/2024    LDLCALC 99 05/15/2024    TRIG 179 (H) 05/15/2024    HDL 45 (L) 05/15/2024    CHOLESTEROL 180 05/15/2024       I have personally reviewed the EKG from May 15, 2024 which showed normal sinus rhythm with no other significant abnormalities.    REVIEW OF SYSTEMS   Positive for: As per HPI  Negative for: All remaining as reviewed below and in HPI.    SYSTEM SYMPTOMS REVIEWED:  General--weight change, fever, night sweats  Respiratory--cough, wheezing, shortness of breath, sputum production  Cardiovascular--chest pain, syncope, dyspnea on exertion, edema, decline in exercise tolerance, claudication   Gastrointestinal--persistent vomiting, diarrhea, abdominal distention, blood in stool   Muscular or skeletal--joint pain or swelling   Neurologic--headaches, syncope, abnormal movement  Hematologic--history of easy bruising and bleeding   Endocrine--thyroid enlargement, heat or cold intolerance, polyuria   Psychiatric--anxiety, depression     Vitals:    06/14/24 1050   BP: 144/80   Pulse: 73   SpO2: 98%   Weight: 65.9 kg (145 lb 3.2 oz)   Height: 5' (1.524 m)       Wt Readings from Last 3 Encounters:   06/14/24 65.9 kg (145 lb 3.2 oz)   05/24/24 66.2 kg (146 lb)   05/16/24 66.2 kg (146 lb)        Body mass index is 28.36 kg/m².     General physical examination:    General appearance: Alert, no acute distress, appears stated age, mildly overweight.  HEENT: Mucous membranes are moist.  No obvious abnormality noted.  Neck: Supple with no lymphadenopathy.  No JVD.  Carotid pulses are intact.  No carotid bruit.  Cardiovascular system: Regular rhythm.  Normal S1 and S2.  No murmurs.  No rubs or gallops. Extremities: No edema. No cyanosis.  Pulmonary: Respirations unlabored.  Good air entry bilaterally.  Clear to auscultation bilaterally.  Gastrointestinal: Abdomen is soft and nontender.   "Bowel sounds are positive.  Musculoskeletal: Upper Extremities: Normal upper motor strength. Lower Extremity: Normal motor strength. Gait: Normal.   Skin: Skin is warm. No rashes or lesions.  Neurological: Patient is alert and oriented with no gross motor deficits.  Psychiatric: Mood is normal.  Behavior is normal.        Thank you for allowing me to be a part of this patient's care. If you have further questions, please feel free to contact me.    Sohan Hightower MD, FACC, JHONNY    Portions of the record  have been created with voice recognition software.  Occasional grammatical mistakes or wrong word or \"sound alike\" substitutions may have occurred due to the inherent limitations of voice recognition software. Please reach out to me directly for any clarifications.     "

## 2024-06-17 ENCOUNTER — OFFICE VISIT (OUTPATIENT)
Dept: NEUROLOGY | Facility: CLINIC | Age: 74
End: 2024-06-17

## 2024-06-17 ENCOUNTER — TELEPHONE (OUTPATIENT)
Dept: CARDIOLOGY CLINIC | Facility: CLINIC | Age: 74
End: 2024-06-17

## 2024-06-17 VITALS
HEART RATE: 66 BPM | BODY MASS INDEX: 28.21 KG/M2 | SYSTOLIC BLOOD PRESSURE: 128 MMHG | DIASTOLIC BLOOD PRESSURE: 73 MMHG | TEMPERATURE: 97.8 F | WEIGHT: 143.7 LBS | HEIGHT: 60 IN

## 2024-06-17 DIAGNOSIS — Z86.73 H/O ISCHEMIC RIGHT MCA STROKE: ICD-10-CM

## 2024-06-17 DIAGNOSIS — E78.1 HYPERTRIGLYCERIDEMIA: ICD-10-CM

## 2024-06-17 DIAGNOSIS — E78.5 HYPERLIPIDEMIA: Primary | ICD-10-CM

## 2024-06-17 DIAGNOSIS — R73.03 PREDIABETES: ICD-10-CM

## 2024-06-17 NOTE — PROGRESS NOTES
[facility]  PATIENT:  Radha Dodson  MRN:  [id number]  :  1950  DATE OF SERVICE:  2024  PCP: Ketan Mahmood MD    Assessment/Plan:     H/O ischemic right MCA stroke  Patient presented to the hospital on 5/15/2024 when she was at lunch and experienced left-sided hemiparesis and left facial droop.  She was found to have an acute right MCA stroke and because of worsening symptoms she received TNK and also underwent a mechanical thrombectomy with excellent recovery.  No obvious etiology was found for her recent embolic stroke however clinical history is suggestive of possible previously undiagnosed diagnosed atrial fibrillation.Since the hospital, she denies any new strokelike symptoms.  She also denies any residual symptoms.  She is has followed up with cardiology and will be starting her Zio patch.  She works for approximately 30 hours part-time at [employer] and denies any difficulty doing her job.  She has been compliant with taking her Plavix 75 mg daily and denies any excessive bruising or bleeding.  She has also been taking her atorvastatin 40 mg daily and denies any muscle cramping or other side effects.  For ongoing stroke prevention continue: Plavix 75 mg daily and Lipitor 40 mg daily  Recommend to check blood pressure occasionally away from the doctor's office to make sure that those numbers are typically less than 130/80.  If they are frequently higher than that, we recommend checking it more frequently and following up with primary care team/cardiologist   Lipid panel ordered for patient to complete in 3 months to assess efficacy of Lipitor.  Will defer to primary care team for future monitoring of cholesterol panel and blood sugar numbers with target LDL cholesterol of less than 70 and hemoglobin A1c less than 7%  Recommend following a low salt, mediterranean diet   Recommend routine physical exercise as tolerated   Avoid using NSAIDs for headaches or  other pain and to use tylenol if needed    Continue to follow-up with cardiology as scheduled for follow-up on Zio patch and possible need for future loop recorder       Diagnoses and all orders for this visit:    Hyperlipidemia  -     Lipid Panel with Direct LDL reflex; Future    H/O ischemic right MCA stroke    Prediabetes    Hypertriglyceridemia    Other orders  -     NON FORMULARY; Taking a Calcium Supplement         We will plan for her to return to the office in 6 months to see myself or Dr. Clark, but would be happy to see her sooner if the need should arise.  If she has any symptoms concerning for TIA or stroke including sudden painless loss of vision or double vision, difficulty speaking or swallowing, vertigo/room spinning that does not quickly resolve, or weakness/numbness/loss of coordination affecting 1 side of the face or body, she should proceed by ambulance to the nearest emergency room immediately.     CC:     We had the pleasure of evaluating Radha in neurological follow-up today. She is a 73 y.o. year-old female who presents today for a hospital follow up after stroke.     History obtained from patient as well as available medical record review.    History of Present Illness:     For Review/Hospital Course:    [birth year]89-zuos-old female with no significant past medical history who was recently hospitalized in May 2024 with an acute right MCA stroke with left sided hemiparesis and left facial droop.  Because of worsening of symptoms, she received TNK and mechanical thrombectomy with excellent recovery.  She was referred to cardiology as an outpatient to rule out atrial fibrillation as a possible cause of her recent stroke.     Interval History:    Since the hospital, she denies any new strokelike symptoms.  She also denies any residual symptoms.  She is has followed up with cardiology and will be starting her Zio patch.  She works for approximately 30 hours part-time at [employer] and denies any  "difficulty doing her job.  She has been compliant with taking her Plavix 75 mg daily and denies any excessive bruising or bleeding.  She has also been taking her atorvastatin 40 mg daily and denies any muscle cramping or other side effects.    New stroke symptoms/residual symptoms:    Any new, sudden onset weakness, numbness, facial droop, slurred speech, difficulty speaking, trouble swallowing, persistent vertigo, or sudden double vision or vision loss? No    Residual symptoms include: None    Stroke Etiology:    This was a(n) embolic stroke, most likely related to cardioembolic source. Completing holter monitor to assess for arrhythmia.     Stroke risk factors were evaluated including:     AP/AC therapy: Plavix 75mg daily    Statin therapy: Lipitor 40mg daily    Blood pressure today and as of late: 128/73 today in the office.     Most recent LDL:   Lab Results   Component Value Date    LDLCALC 99 05/15/2024        Most recent hemoglobin A1C:   Lab Results   Component Value Date    HGBA1C 6.0 (H) 05/15/2024        Cardiology evaluation/Cardiac Monitorin24 had office visit-\"No obvious etiology was found for her recent embolic stroke. However the clinical history is suggestive of possible previously undiagnosed atrial fibrillation. We will proceed with a 2-week ZIO monitor and if it shows no atrial fibrillation, she will be scheduled for a implantable loop recorder in view of high probability of atrial fibrillation\"    Endocrinology evaluation: N/A. Family doctor monitoring and managing pre-diabetes    Lifestyle history/modifications:    -Diet/Exercise regimen: works at [employer] and does a lot of walking.     -PT/OT/ST: none needed     -Sleep: 7-7.5 hours per night    -RICKI/CPAP Compliance: none diagnosed    -Post-stroke depression/anxiety:none at this time.     -Smoking: never    -ETOH: socially-rarely    -Illicit drug use:none    Safety:    Independent of all ADLs.  In denies any falls at home and ambulates " without an assistive device.  She manages all her own medications and finances.  She drives her own vehicle.  She denies any issues with short-term memory, swallowing, or speech.  She reports working approximately 30 hours/week at [employer].       Past Medical History:     Past Medical History:   Diagnosis Date    Stroke (HCC)        Patient Active Problem List   Diagnosis    H/O ischemic right MCA stroke    Prediabetes    Hydroureteronephrosis    Palpitations    Hypertriglyceridemia       Medications:     Current Outpatient Medications   Medication Sig Dispense Refill    atorvastatin (LIPITOR) 40 mg tablet Take 1 tablet (40 mg total) by mouth daily with dinner 30 tablet 5    clopidogrel (PLAVIX) 75 mg tablet Take 1 tablet (75 mg total) by mouth daily 30 tablet 5    Multiple Vitamin (multivitamin) tablet Take 1 tablet by mouth daily      NON FORMULARY Taking a Calcium Supplement       No current facility-administered medications for this visit.        Allergies:     No Known Allergies    Family History:     Family History   Problem Relation Age of Onset    Atrial fibrillation Mother     Lung cancer Father        Social History:     Social History     Socioeconomic History    Marital status: Single     Spouse name: Not on file    Number of children: Not on file    Years of education: Not on file    Highest education level: Not on file   Occupational History    Not on file   Tobacco Use    Smoking status: Never    Smokeless tobacco: Never   Vaping Use    Vaping status: Never Used   Substance and Sexual Activity    Alcohol use: Yes     Comment: Social    Drug use: Never    Sexual activity: Not on file   Other Topics Concern    Not on file   Social History Narrative    Not on file     Social Determinants of Health     Financial Resource Strain: Not on file   Food Insecurity: No Food Insecurity (5/16/2024)    Hunger Vital Sign     Worried About Running Out of Food in the Last Year: Never true     Ran Out of Food in the  Last Year: Never true   Transportation Needs: No Transportation Needs (5/16/2024)    PRAPARE - Transportation     Lack of Transportation (Medical): No     Lack of Transportation (Non-Medical): No   Physical Activity: Not on file   Stress: Not on file   Social Connections: Not on file   Intimate Partner Violence: Not on file   Housing Stability: Low Risk  (5/16/2024)    Housing Stability Vital Sign     Unable to Pay for Housing in the Last Year: No     Number of Times Moved in the Last Year: 1     Homeless in the Last Year: No       Objective:     Physical Exam:    Vitals:  /73 (BP Location: Left arm, Patient Position: Sitting, Cuff Size: Standard)   Pulse 66   Temp 97.8 °F (36.6 °C) (Temporal)   Ht 5' (1.524 m)   Wt 65.2 kg (143 lb 11.2 oz)   BMI 28.06 kg/m²   BP Readings from Last 3 Encounters:   06/17/24 128/73   06/14/24 144/80   05/24/24 112/62     Pulse Readings from Last 3 Encounters:   06/17/24 66   06/14/24 73   05/24/24 92       Constitutional:       Appearance: Normal appearance.   HENT:      Head: Normocephalic and atraumatic.      Nose: Nose normal.      Mouth: Mucous membranes are moist.   Pulmonary:      Effort: Pulmonary effort is normal.    Skin:     General: Skin is warm and dry.   Psychiatric:         Affect normal.   Neurologic Exam     Mental Status   Oriented to person, place, and time.   Speech: speech is normal   Level of consciousness: alert  Able to name object.     Cranial Nerves   Cranial nerves II through XII intact.     Motor Exam   Muscle bulk: normal  Right arm pronator drift: absent  Left arm pronator drift: absent    Strength   Strength 5/5 throughout.     Sensory Exam   Light touch normal.     Gait, Coordination, and Reflexes     Gait  Gait: normal    Coordination   Finger to nose coordination: normal (self-corrected on left)    Tremor   Resting tremor: absent      Pertinent lab results:    Lab Results   Component Value Date/Time    CHOLESTEROL 180 05/15/2024 02:55 PM      Lab Results   Component Value Date/Time    TRIG 179 (H) 05/15/2024 02:55 PM     Lab Results   Component Value Date/Time    HDL 45 (L) 05/15/2024 02:55 PM     Lab Results   Component Value Date/Time    LDLCALC 99 05/15/2024 02:55 PM        Lab Results   Component Value Date/Time    HGBA1C 6.0 (H) 05/15/2024 02:55 PM     Lab Results   Component Value Date/Time     05/15/2024 02:55 PM        Pertinent Imagin/15/24 CTH: No acute intracranial abnormality. Mild chronic microangiopathy.  5/15/24 CTA H&N: Occlusive thrombus in right MCA distal M1 bifurcation extending into proximal M2 superior and inferior divisions. Contrast opacification is noted beyond this occluded segment likely due to good collaterals. Severe stenosis in right PCA P2 segment.  5/15/24 IR Thrombectomy: Right MCA M1 occlusion, TICI 0.  Successful mechanical thrombectomy with TICI 3 reperfusion.  5/15/24 MRI brain:Very small acute infarct in the right frontal lobe. No acute hemorrhage. Mild chronic microangiopathy  24 CTH: No acute infarct identified. Tiny right frontal cortical infarct identified on yesterday's MRI is not well seen. No acute hemorrhage or mass effect. Stable chronic microangiopathy.     Review of Systems:     Constitutional:  Negative for appetite change, fatigue and fever.   HENT: Negative.  Negative for hearing loss, tinnitus, trouble swallowing and voice change.    Eyes: Negative.  Negative for photophobia, pain and visual disturbance.   Respiratory: Negative.  Negative for shortness of breath.    Cardiovascular: Negative.  Negative for palpitations.   Gastrointestinal: Negative.  Negative for nausea and vomiting.   Endocrine: Negative.  Negative for cold intolerance.   Genitourinary: Negative.  Negative for dysuria, frequency and urgency.   Musculoskeletal:  Negative for back pain, gait problem, myalgias, neck pain and neck stiffness.   Skin: Negative.  Negative for rash.   Allergic/Immunologic: Negative.     Neurological: Negative.  Negative for dizziness, tremors, seizures, syncope, facial asymmetry, speech difficulty, weakness, light-headedness, numbness and headaches.   Hematological: Negative.  Does not bruise/bleed easily.   Psychiatric/Behavioral: Negative.  Negative for confusion, hallucinations and sleep disturbance.       I have reviewed the ROS as entered by medical assistant.     I have spent a total time of 35 minutes on 06/17/24 in caring for this patient including Diagnostic results, Prognosis, Risks and benefits of tx options, Instructions for management, Patient and family education, Importance of tx compliance, Risk factor reductions, Impressions, Counseling / Coordination of care, Documenting in the medical record, Reviewing / ordering tests, medicine, procedures  , and Obtaining or reviewing history  .       Author:  ERAN Cabrera 6/17/2024 11:19 AM

## 2024-06-17 NOTE — TELEPHONE ENCOUNTER
----- Message from Rosario DING sent at 6/14/2024 12:23 PM EDT -----  Regarding: FW: implantable loop recorder    ----- Message -----  From: Nicoltete Monet  Sent: 6/14/2024  11:14 AM EDT  To: Mami Downs; Sohan Hightower MD; #  Subject: RE: implantable loop recorder                    Surgery schedulers and clinical staff please see below    ----- Message -----  From: Sohan Hightower MD  Sent: 6/14/2024  11:12 AM EDT  To: Cardiology Ep Clerical  Subject: implantable loop recorder                        This patient is post recent ischemic stroke.  We are proceeding with a 2-week ZIO monitor.  However I would like her to be tentatively scheduled for an implantable loop recorder in the week of July 15 to 19.  If the ZIO monitor shows atrial fibrillation prior to that, we will cancel the implant.

## 2024-06-17 NOTE — TELEPHONE ENCOUNTER
"I called patient to tentatively schedule LOOP Recorder Implant and she was a little hesitant on scheduling the procedure. She mentioned that she still has not put on the 2W ZIO since the office told her it's pending authorization. I told her per , that he requested she be tentatively scheduled pending ZIO results. Patient said she was busy and requested a call back. Informed her that I'll call her back tomorrow. Sent Paola De La Garza:   Please let me know when 2W ZIO is placed on patient so I can keep track to get auth for LOOP Implant.     Thanks,  Mary \"Monika\" Curtis     "

## 2024-06-17 NOTE — PATIENT INSTRUCTIONS
Stroke:  - For ongoing stroke prevention continue: Plavix 75 mg daily and Lipitor 40 mg daily.  - Discussed the importance of antiplatelet/anticoagulant management with the patient to prevent future strokes.   - Recommend to check blood pressure occasionally away from the doctor's office to make sure that those numbers are typically less than 130/80.  If they are frequently higher than that, we recommend checking a little more often and to follow up with primary care team.  - Will repeat cholesterol panel in 3 months to assess efficacy of Lipitor.  Will defer to primary care team for nature monitoring of cholesterol panel and blood sugar numbers with target LDL cholesterol of less than 70 and hemoglobin A1c less than 7%.  - Recommend following a low salt, mediterranean diet.   - Recommend routine physical exercise as tolerated.  - Avoid using NSAIDs (like ibuprofen, motrin, or aleve) for headaches or other pain and to use tylenol if needed.   -Follow-up with cardiology as scheduled for evaluation of Zio patch    We will plan for you to return to the office in 6 months to see myself or MD, but would be happy to see you sooner if the need should arise.  If you have any symptoms concerning for TIA or stroke including: sudden painless loss of vision or double vision, difficulty speaking or swallowing, vertigo/room spinning that does not quickly resolve, or weakness/numbness/loss of coordination affecting 1 side of the face or body, you should proceed by ambulance to the nearest emergency room immediately.

## 2024-06-17 NOTE — ASSESSMENT & PLAN NOTE
Patient presented to the hospital on 5/15/2024 when she was at lunch and experienced left-sided hemiparesis and left facial droop.  She was found to have an acute right MCA stroke and because of worsening symptoms she received TNK and also underwent a mechanical thrombectomy with excellent recovery.  No obvious etiology was found for her recent embolic stroke however clinical history is suggestive of possible previously undiagnosed diagnosed atrial fibrillation.Since the hospital, she denies any new strokelike symptoms.  She also denies any residual symptoms.  She is has followed up with cardiology and will be starting her Zio patch.  She works for approximately 30 hours part-time at [employer] and denies any difficulty doing her job.  She has been compliant with taking her Plavix 75 mg daily and denies any excessive bruising or bleeding.  She has also been taking her atorvastatin 40 mg daily and denies any muscle cramping or other side effects.  For ongoing stroke prevention continue: Plavix 75 mg daily and Lipitor 40 mg daily  Recommend to check blood pressure occasionally away from the doctor's office to make sure that those numbers are typically less than 130/80.  If they are frequently higher than that, we recommend checking it more frequently and following up with primary care team/cardiologist   Lipid panel ordered for patient to complete in 3 months to assess efficacy of Lipitor.  Will defer to primary care team for future monitoring of cholesterol panel and blood sugar numbers with target LDL cholesterol of less than 70 and hemoglobin A1c less than 7%  Recommend following a low salt, mediterranean diet   Recommend routine physical exercise as tolerated   Avoid using NSAIDs for headaches or other pain and to use tylenol if needed    Continue to follow-up with cardiology as scheduled for follow-up on Zio patch and possible need for future loop recorder

## 2024-06-17 NOTE — TELEPHONE ENCOUNTER
I checked [employer] and it states that the Zio patch is mailed to her. It is still in transit, so she has not received it as of yet. She will place it when it is received.

## 2024-06-17 NOTE — PROGRESS NOTES
Review of Systems   Constitutional:  Negative for appetite change, fatigue and fever.   HENT: Negative.  Negative for hearing loss, tinnitus, trouble swallowing and voice change.    Eyes: Negative.  Negative for photophobia, pain and visual disturbance.   Respiratory: Negative.  Negative for shortness of breath.    Cardiovascular: Negative.  Negative for palpitations.   Gastrointestinal: Negative.  Negative for nausea and vomiting.   Endocrine: Negative.  Negative for cold intolerance.   Genitourinary: Negative.  Negative for dysuria, frequency and urgency.   Musculoskeletal:  Negative for back pain, gait problem, myalgias, neck pain and neck stiffness.   Skin: Negative.  Negative for rash.   Allergic/Immunologic: Negative.    Neurological: Negative.  Negative for dizziness, tremors, seizures, syncope, facial asymmetry, speech difficulty, weakness, light-headedness, numbness and headaches.   Hematological: Negative.  Does not bruise/bleed easily.   Psychiatric/Behavioral: Negative.  Negative for confusion, hallucinations and sleep disturbance.

## 2024-06-19 ENCOUNTER — TELEPHONE (OUTPATIENT)
Dept: NEUROLOGY | Facility: CLINIC | Age: 74
End: 2024-06-19

## 2024-06-19 NOTE — TELEPHONE ENCOUNTER
Post CVA Discharge Follow Up  Hospitalization: 5/15/24-5/17/24    Called patient to obtain an update. Patient did not answer. Left a voice message requesting for a return call. Provided the office's phone number.

## 2024-06-20 NOTE — TELEPHONE ENCOUNTER
Received  6/20/24 1127 pm    Radha Dodson [birth year]. Arleth called me yesterday to see how everything was and is fine. If she wants to call me back at . Thank you.

## 2024-06-21 NOTE — TELEPHONE ENCOUNTER
6/21/24, 4:01    Pt left a message returning Arleth's call.    JEWEL GOMES presents today to establish care.  She is accompanied by her  as well as her daughter who interprets from English to Kat[place].    The patient has a history of migraine and restless leg syndrome, for which she takes nortriptyline 10 mg q.h.s..  She states that it is moderately effective and she does like the relief that she gets, but it is not completely effective.      The patient otherwise feels well, denies any complaints, and is taking the medication as prescribed and tolerating it without side effects.    History reviewed. No pertinent past medical history.  Current Outpatient Medications   Medication Sig Dispense Refill   • gabapentin (NEURONTIN) 100 MG capsule Take 1 capsule by mouth daily. 90 capsule 1   • nortriptyline (PAMELOR) 10 MG capsule Take 2 capsules by mouth nightly. 180 capsule 1   • propRANolol (INDERAL) 40 MG tablet Take 1 tablet by mouth daily. 90 tablet 1   • DULoxetine (CYMBALTA) 20 MG capsule Take 1 capsule by mouth daily. 90 capsule 1   • omeprazole (PriLOSEC) 40 MG capsule Take 1 capsule by mouth daily. 30 capsule 5   • Ferrous Sulfate (IRON PO) Take 65 mg by mouth daily.     • Loratadine 10 MG Cap Take by mouth daily.     • ondansetron (ZOFRAN ODT) 4 MG disintegrating tablet Place 1 tablet onto the tongue every 6 hours. 10 tablet 0   • mupirocin (BACTROBAN) 2 % ointment Apply topically 3 times daily. 22 g 0   • benzonatate (TESSALON PERLES) 100 MG capsule Take 1 capsule by mouth 3 times daily as needed for Cough. 30 capsule 0   • atorvastatin (LIPITOR) 10 MG tablet Take 10 mg by mouth daily.     • Vitamin D, Ergocalciferol, 1.25 mg (50,000 units) capsule Take 1.25 mg by mouth 1 day a week.     • losartan (COZAAR) 25 MG tablet Take 25 mg by mouth daily.       No current facility-administered medications for this visit.     ALLERGIES:   Allergen Reactions   • Ibuprofen Other (See Comments)   • Tree Nuts    (Food) Other (See Comments)     Cough, Dyspnea   • Pramipexole Nausea  & Vomiting     Social History     Socioeconomic History   • Marital status: /Civil Union     Spouse name: Not on file   • Number of children: Not on file   • Years of education: Not on file   • Highest education level: Not on file   Occupational History   • Not on file   Tobacco Use   • Smoking status: Never   • Smokeless tobacco: Never   Substance and Sexual Activity   • Alcohol use: Never   • Drug use: Never   • Sexual activity: Not on file   Other Topics Concern   • Not on file   Social History Narrative   • Not on file     Social Determinants of Health     Financial Resource Strain: Not on file   Food Insecurity: Not on file   Transportation Needs: Not on file   Physical Activity: Not on file   Stress: Not on file   Social Connections: Not on file   Intimate Partner Violence: Not on file             PHYSICAL EXAM  Alert, pleasant, in no apparent distress  Visit Vitals  BP (!) 158/80   Pulse 68   Temp 97.8 °F (36.6 °C)   Resp 14   Ht 5' 4\" (1.626 m)   Wt 77.6 kg (171 lb)   BMI 29.35 kg/m²     Neck: no carotid bruit  Heart:  Regular rate and rhythm. Normal S1 and S2 without S3 or S4. No murmur, rub or click.  Lungs:  Clear to auscultation. No rales, rhonchi or wheezes.      Orders Placed This Encounter   • OPEN ACCESS COLONOSCOPY   • Occult Blood - iFOB (aka FIT)   • Lipid Panel With Reflex   • Magnesium   • Comprehensive Metabolic Panel   • Vitamin D -25 Hydroxy   • Ferrous Sulfate (IRON PO)   • Loratadine 10 MG Cap   • omeprazole (PriLOSEC) 40 MG capsule   • gabapentin (NEURONTIN) 100 MG capsule   • nortriptyline (PAMELOR) 10 MG capsule   • propRANolol (INDERAL) 40 MG tablet   • DULoxetine (CYMBALTA) 20 MG capsule       Encounter Diagnoses   Name Primary?   • Migraine without status migrainosus, not intractable, unspecified migraine type Yes   • RLS (restless legs syndrome)    • Colon cancer screening    • Medication management    • Lipid screening    • Vitamin D deficiency      Increase nortriptyline to  20 mg q.h.s..  Also, refilled other medications.  As regards her blood pressure, I recommend nurse blood pressure check in the coming month.  The patient states that she prefers to manage this problem without medication.  Follow up as noted above or sooner as needed    No follow-ups on file.

## 2024-06-21 NOTE — TELEPHONE ENCOUNTER
Post CVA Discharge Follow Up  Hospitalization: 5/15/24-5/17/24    Returned call to patient to obtain an update. Patient did not answer. Left a voice message requesting for a return call. Provided the office's phone number.

## 2024-06-24 NOTE — TELEPHONE ENCOUNTER
Post CVA Discharge Follow Up  Hospitalization: 5/15/24-5/17/24    Called patient. Since discharge, she denies experiencing any new or worsening stroke-like symptoms. Patient denies the presence of any residual stroke symptoms following hospitalization and claims she has returned to baseline functioning.     She is ambulating independently as well as preforming her own ADLs. Patient manages her own medications, appointments, and affairs. She has returned to work at [employer] and denies any issues.     Reviewed appointments - patient successfully followed up with PCP, neurology, and cardiology.     Reviewed stroke related medications with her. Reports she is taking as prescribed with no medication side effects or signs of bleeding.    As for risk factors, patient's BP at the neurology office visit on 6/17/24 was 128/73.  She is a non smoker.   She is following a well balanced diet.   She walks around frequently while at work.    All of her questions were addressed. At the conclusion of the conversation, patient denies having any further questions or concerns.

## 2024-06-24 NOTE — TELEPHONE ENCOUNTER
"Per patient she wore 2W ZIO on 6/17/24.     Patient wants to wait for results of 2W ZIO to see if needs to proceed with scheduling LOOP Implant.     Thanks,  Mary \"Monika\" Curtis     "

## 2024-07-10 PROBLEM — N81.9 FEMALE GENITAL PROLAPSE: Status: ACTIVE | Noted: 2024-07-10

## 2024-07-11 ENCOUNTER — OFFICE VISIT (OUTPATIENT)
Dept: UROLOGY | Facility: CLINIC | Age: 74
End: 2024-07-11
Payer: COMMERCIAL

## 2024-07-11 VITALS
TEMPERATURE: 97.9 F | WEIGHT: 144.8 LBS | HEART RATE: 77 BPM | BODY MASS INDEX: 28.43 KG/M2 | DIASTOLIC BLOOD PRESSURE: 64 MMHG | RESPIRATION RATE: 16 BRPM | OXYGEN SATURATION: 99 % | HEIGHT: 60 IN | SYSTOLIC BLOOD PRESSURE: 128 MMHG

## 2024-07-11 DIAGNOSIS — N81.9 FEMALE GENITAL PROLAPSE, UNSPECIFIED TYPE: ICD-10-CM

## 2024-07-11 DIAGNOSIS — N13.30 HYDROURETERONEPHROSIS: Primary | ICD-10-CM

## 2024-07-11 LAB
POST-VOID RESIDUAL VOLUME, ML POC: 0 ML
SL AMB  POCT GLUCOSE, UA: ABNORMAL
SL AMB LEUKOCYTE ESTERASE,UA: ABNORMAL
SL AMB POCT BILIRUBIN,UA: ABNORMAL
SL AMB POCT BLOOD,UA: ABNORMAL
SL AMB POCT CLARITY,UA: CLEAR
SL AMB POCT COLOR,UA: YELLOW
SL AMB POCT KETONES,UA: ABNORMAL
SL AMB POCT NITRITE,UA: ABNORMAL
SL AMB POCT PH,UA: 5.5
SL AMB POCT SPECIFIC GRAVITY,UA: 1.01
SL AMB POCT URINE PROTEIN: ABNORMAL
SL AMB POCT UROBILINOGEN: 0.2

## 2024-07-11 PROCEDURE — 99203 OFFICE O/P NEW LOW 30 MIN: CPT | Performed by: UROLOGY

## 2024-07-11 PROCEDURE — 51798 US URINE CAPACITY MEASURE: CPT | Performed by: UROLOGY

## 2024-07-11 PROCEDURE — 81003 URINALYSIS AUTO W/O SCOPE: CPT | Performed by: UROLOGY

## 2024-07-11 NOTE — PROGRESS NOTES
UROLOGY PROGRESS NOTE         NAME: Radha Dodson  AGE: 74 y.o. SEX: female  : 1950   MRN: [id number]    DATE: 2024  TIME: 9:17 AM    Assessment and Plan      Impression:   1. Hydroureteronephrosis  -     Ambulatory Referral to Urology  -     POCT urine dip auto non-scope  -     POCT Measure PVR  2. Female genital prolapse, unspecified type  -     Ambulatory Referral to Obstetrics / Gynecology; Future; Expected date: 2024       Plan: Patient declined a vaginal exam today.  She is willing to see a gynecologist to discuss treatment alternatives for her prolapse.  She would like to see someone here and we will make that appointment..  She had microhematuria noted 1+.  I recommended a flexible cystoscopy.  She will return for that.  Her upper urinary tract imaging did not reveal any other clinically significant source.  Mild bilateral hydronephrosis was noted very likely due to her prolapse.  I recommended that she manually reduce her prolapse before voiding and whenever feasible in the meantime.      Chief Complaint     Chief Complaint   Patient presents with   • New Patient Visit     Follow up from the hospital with hydronephrosis      History of Present Illness     HPI: Radha Dodson is a 74 y.o. year old female who presents with bilateral mild hydronephrosis with appropriate renal excretion of contrast on CT scan and evidence of bladder prolapse vaginally on CT scan.  This is likely source of her hydronephrosis.  No flank pain no bladder pain.  No history of recurrent UTIs.  Feels as though she empties her bladder well.    Postvoid residual today was negligible.  No UTI symptoms              The following portions of the patient's history were reviewed and updated as appropriate: allergies, current medications, past family history, past medical history, past social history, past surgical history and problem list.  Past Medical History:   Diagnosis Date   • Stroke (HCC)      Past Surgical  History:   Procedure Laterality Date   • IR STROKE ALERT  5/15/2024     shoulder  Review of Systems     Const: Denies chills, fever and weight loss.  CV: Denies chest pain.  Resp: Denies SOB.  GI: Denies abdominal pain, nausea and vomiting.  : Denies symptoms other than stated above.  Musculo: Denies back pain.    Objective   /64   Pulse 77   Temp 97.9 °F (36.6 °C) (Tympanic)   Resp 16   Ht 5' (1.524 m)   Wt 65.7 kg (144 lb 12.8 oz)   SpO2 99%   BMI 28.28 kg/m²     Physical Exam  Const: Appears healthy and well developed. No signs of acute distress present.  Resp: Respirations are regular and unlabored.   CV: Rate is regular. Rhythm is regular.  Abdomen: Abdomen is soft, nontender, and nondistended. Kidneys are not palpable.  : Patient refused  Psych: Patient's attitude is cooperative. Mood is normal. Affect is normal.    Procedure   Procedures     Current Medications     Current Outpatient Medications:   •  atorvastatin (LIPITOR) 40 mg tablet, Take 1 tablet (40 mg total) by mouth daily with dinner, Disp: 30 tablet, Rfl: 5  •  clopidogrel (PLAVIX) 75 mg tablet, Take 1 tablet (75 mg total) by mouth daily, Disp: 30 tablet, Rfl: 5  •  Multiple Vitamin (multivitamin) tablet, Take 1 tablet by mouth daily, Disp: , Rfl:   •  NON FORMULARY, in the morning Taking a Calcium Supplement, Disp: , Rfl:         Primitivo Fagan MD

## 2024-07-14 PROBLEM — R31.29 MICROHEMATURIA: Status: ACTIVE | Noted: 2024-07-14

## 2024-07-15 ENCOUNTER — TELEPHONE (OUTPATIENT)
Dept: CARDIOLOGY CLINIC | Facility: CLINIC | Age: 74
End: 2024-07-15

## 2024-07-15 ENCOUNTER — PROCEDURE VISIT (OUTPATIENT)
Dept: UROLOGY | Facility: CLINIC | Age: 74
End: 2024-07-15
Payer: COMMERCIAL

## 2024-07-15 VITALS
DIASTOLIC BLOOD PRESSURE: 72 MMHG | SYSTOLIC BLOOD PRESSURE: 134 MMHG | WEIGHT: 143.2 LBS | BODY MASS INDEX: 28.11 KG/M2 | TEMPERATURE: 97 F | HEART RATE: 73 BPM | HEIGHT: 60 IN | OXYGEN SATURATION: 96 %

## 2024-07-15 DIAGNOSIS — R31.29 MICROHEMATURIA: Primary | ICD-10-CM

## 2024-07-15 LAB
SL AMB  POCT GLUCOSE, UA: ABNORMAL
SL AMB LEUKOCYTE ESTERASE,UA: ABNORMAL
SL AMB POCT BILIRUBIN,UA: ABNORMAL
SL AMB POCT BLOOD,UA: ABNORMAL
SL AMB POCT CLARITY,UA: CLEAR
SL AMB POCT COLOR,UA: YELLOW
SL AMB POCT KETONES,UA: ABNORMAL
SL AMB POCT NITRITE,UA: ABNORMAL
SL AMB POCT PH,UA: 6
SL AMB POCT SPECIFIC GRAVITY,UA: 1.03
SL AMB POCT URINE PROTEIN: ABNORMAL
SL AMB POCT UROBILINOGEN: 0.2

## 2024-07-15 PROCEDURE — 52000 CYSTOURETHROSCOPY: CPT | Performed by: UROLOGY

## 2024-07-15 PROCEDURE — 81003 URINALYSIS AUTO W/O SCOPE: CPT | Performed by: UROLOGY

## 2024-07-15 NOTE — TELEPHONE ENCOUNTER
Spoke with Venecia the Ref # is [id number]. They stated they spoke to gale hu about 10 minutes. This is handled.

## 2024-07-15 NOTE — TELEPHONE ENCOUNTER
Orly from [facility] stating she has an abn result on pt.  Please call [phone]    Ref# [id number]

## 2024-07-15 NOTE — PROGRESS NOTES
Cystoscopy     Date/Time  7/15/2024 8:00 AM     Performed by  Dev Fagan MD   Authorized by  Dev Fagan MD     Universal Protocol:  Consent: Verbal consent obtained. Written consent obtained.  Risks and benefits: risks, benefits and alternatives were discussed  Consent given by: patient  Patient understanding: patient states understanding of the procedure being performed  Patient consent: the patient's understanding of the procedure matches consent given  Procedure consent: procedure consent matches procedure scheduled  Relevant documents: relevant documents present and verified  Patient identity confirmed: verbally with patient      Procedure Details:  Procedure type: cystoscopy    Patient tolerance: Patient tolerated the procedure well with no immediate complications    Additional Procedure Details: Patient with microhematuria and prolapse.  Also had some mild bilateral hydronephrosis likely related to the prolapse and incomplete bladder emptying..    Patient's been reducing her prolapse and voiding.  Feels as though she is emptying more efficiently.  UA today was unremarkable.    Patient has a scheduled visit with gynecologist soon.    On exam the patient has a complete prolapse.  Cervix is presenting out through the introitus grade 4.  No ulcer but skin of vagina very dry and cracked.    Prolapse was reduced and vagina prepped and draped in usual sterile fashion using Betadine.  Meatus identified and lidocaine jelly instilled flexible cystoscopy then performed urethra is normal the anterior the bladder is normal.  There were no bladder tumors stones or diverticula.  Some mild inflammatory changes on the trigone but no evidence of bladder tumor.  I digitally elevated the trigone and examined this more carefully.  This is due to the prolapse.  This was also with the patient's permission.  The trigone and ureteral orifice ease are normal.  Both effluxed clear urine.  The cystoscopy was unremarkable in  regard to any bladder tumor stone or diverticula.  None of these identified.  The scope was retroflexed.  Bladder outlet carefully inspected nothing abnormal identified.  The scope was then removed patient tolerated procedure well    She received Keflex and we will keep her on this for about 3 days.  She is going to see the gynecologist and would benefit from some type of management regarding her prolapse.  This would help her urologically and that her bilateral hydro is likely related to the prolapse issue.  She will continue to manually reduce her prolapse so she can empty completely.  We plan to see her back in about 2 months.  We can get another ultrasound at that time if needed after her her prolapse has been addressed.

## 2024-07-16 ENCOUNTER — CLINICAL SUPPORT (OUTPATIENT)
Dept: CARDIOLOGY CLINIC | Facility: CLINIC | Age: 74
End: 2024-07-16
Payer: COMMERCIAL

## 2024-07-16 DIAGNOSIS — I48.0 PAROXYSMAL ATRIAL FIBRILLATION (HCC): Primary | ICD-10-CM

## 2024-07-16 DIAGNOSIS — I63.511 ACUTE ISCHEMIC RIGHT MCA STROKE (HCC): ICD-10-CM

## 2024-07-16 PROCEDURE — 93248 EXT ECG>7D<15D REV&INTERPJ: CPT

## 2024-07-16 RX ORDER — METOPROLOL SUCCINATE 25 MG/1
25 TABLET, EXTENDED RELEASE ORAL DAILY
Qty: 90 TABLET | Refills: 3 | Status: SHIPPED | OUTPATIENT
Start: 2024-07-16

## 2024-07-23 ENCOUNTER — TELEPHONE (OUTPATIENT)
Dept: CARDIOLOGY CLINIC | Facility: CLINIC | Age: 74
End: 2024-07-23

## 2024-07-23 NOTE — TELEPHONE ENCOUNTER
----- Message from Gisselle PERAZA sent at 7/23/2024 11:39 AM EDT -----  Regarding: RE: A-fib ablation evaluation  LVM X2 for pt to [facility] appt with EP  ----- Message -----  From: Gisselle Alejandre  Sent: 7/19/2024   2:23 PM EDT  To: Sohan iHghtower MD; Cardiology Ep Clerical  Subject: RE: A-fib ablation evaluation                    LVM for pt to call office and [facility] consult appt  ----- Message -----  From: Sohan Hightower MD  Sent: 7/17/2024   8:00 AM EDT  To: Cardiology Ep Clerical  Subject: A-fib ablation evaluation                        Hi.  This patient was found to have very rapid atrial fibrillation on recent ZIO monitor.  I would like her to be evaluated for A-fib ablation.  Ambulatory EP referral order is already in [facility].

## 2024-08-22 ENCOUNTER — RA CDI HCC (OUTPATIENT)
Dept: OTHER | Facility: HOSPITAL | Age: 74
End: 2024-08-22

## 2024-08-29 ENCOUNTER — OFFICE VISIT (OUTPATIENT)
Dept: FAMILY MEDICINE CLINIC | Facility: CLINIC | Age: 74
End: 2024-08-29
Payer: COMMERCIAL

## 2024-08-29 VITALS
SYSTOLIC BLOOD PRESSURE: 130 MMHG | DIASTOLIC BLOOD PRESSURE: 88 MMHG | HEIGHT: 60 IN | WEIGHT: 145.2 LBS | BODY MASS INDEX: 28.51 KG/M2

## 2024-08-29 DIAGNOSIS — N81.85 CERVICAL STUMP PROLAPSE: ICD-10-CM

## 2024-08-29 DIAGNOSIS — Z12.11 ENCOUNTER FOR COLORECTAL CANCER SCREENING: ICD-10-CM

## 2024-08-29 DIAGNOSIS — Z12.12 ENCOUNTER FOR COLORECTAL CANCER SCREENING: ICD-10-CM

## 2024-08-29 DIAGNOSIS — R73.03 PREDIABETES: Primary | ICD-10-CM

## 2024-08-29 DIAGNOSIS — E78.1 HYPERTRIGLYCERIDEMIA: ICD-10-CM

## 2024-08-29 DIAGNOSIS — Z78.0 ASYMPTOMATIC POSTMENOPAUSAL STATE: ICD-10-CM

## 2024-08-29 DIAGNOSIS — Z12.31 ENCOUNTER FOR SCREENING MAMMOGRAM FOR BREAST CANCER: ICD-10-CM

## 2024-08-29 DIAGNOSIS — Z00.00 MEDICARE ANNUAL WELLNESS VISIT, SUBSEQUENT: ICD-10-CM

## 2024-08-29 DIAGNOSIS — I48.0 PAROXYSMAL ATRIAL FIBRILLATION (HCC): ICD-10-CM

## 2024-08-29 PROCEDURE — G0439 PPPS, SUBSEQ VISIT: HCPCS | Performed by: FAMILY MEDICINE

## 2024-08-29 PROCEDURE — G0444 DEPRESSION SCREEN ANNUAL: HCPCS | Performed by: FAMILY MEDICINE

## 2024-08-29 PROCEDURE — 99214 OFFICE O/P EST MOD 30 MIN: CPT | Performed by: FAMILY MEDICINE

## 2024-08-29 NOTE — PATIENT INSTRUCTIONS
Medicare Preventive Visit Patient Instructions  Thank you for completing your Welcome to Medicare Visit or Medicare Annual Wellness Visit today. Your next wellness visit will be due in one year (8/30/2025).  The screening/preventive services that you may require over the next 5-10 years are detailed below. Some tests may not apply to you based off risk factors and/or age. Screening tests ordered at today's visit but not completed yet may show as past due. Also, please note that scanned in results may not display below.  Preventive Screenings:  Service Recommendations Previous Testing/Comments   Colorectal Cancer Screening  * Colonoscopy    * Fecal Occult Blood Test (FOBT)/Fecal Immunochemical Test (FIT)  * Fecal DNA/Cologuard Test  * Flexible Sigmoidoscopy Age: 45-75 years old   Colonoscopy: every 10 years (may be performed more frequently if at higher risk)  OR  FOBT/FIT: every 1 year  OR  Cologuard: every 3 years  OR  Sigmoidoscopy: every 5 years  Screening may be recommended earlier than age 45 if at higher risk for colorectal cancer. Also, an individualized decision between you and your healthcare provider will decide whether screening between the ages of 76-85 would be appropriate. Colonoscopy: Not on file  FOBT/FIT: Not on file  Cologuard: Not on file  Sigmoidoscopy: Not on file          Breast Cancer Screening Age: 40+ years old  Frequency: every 1-2 years  Not required if history of left and right mastectomy Mammogram: Not on file        Cervical Cancer Screening Between the ages of 21-29, pap smear recommended once every 3 years.   Between the ages of 30-65, can perform pap smear with HPV co-testing every 5 years.   Recommendations may differ for women with a history of total hysterectomy, cervical cancer, or abnormal pap smears in past. Pap Smear: Not on file    Screening Not Indicated   Hepatitis C Screening Once for adults born between 1945 and 1965  More frequently in patients at high risk for Hepatitis  C Hep C Antibody: Not on file        Diabetes Screening 1-2 times per year if you're at risk for diabetes or have pre-diabetes Fasting glucose: No results in last 5 years (No results in last 5 years)  A1C: 6.0 % (5/15/2024)  Screening Current   Cholesterol Screening Once every 5 years if you don't have a lipid disorder. May order more often based on risk factors. Lipid panel: 05/15/2024    Screening Not Indicated  History Lipid Disorder     Other Preventive Screenings Covered by Medicare:  Abdominal Aortic Aneurysm (AAA) Screening: covered once if your at risk. You're considered to be at risk if you have a family history of AAA.  Lung Cancer Screening: covers low dose CT scan once per year if you meet all of the following conditions: (1) Age 55-77; (2) No signs or symptoms of lung cancer; (3) Current smoker or have quit smoking within the last 15 years; (4) You have a tobacco smoking history of at least 20 pack years (packs per day multiplied by number of years you smoked); (5) You get a written order from a healthcare provider.  Glaucoma Screening: covered annually if you're considered high risk: (1) You have diabetes OR (2) Family history of glaucoma OR (3)  aged 50 and older OR (4)  American aged 65 and older  Osteoporosis Screening: covered every 2 years if you meet one of the following conditions: (1) You're estrogen deficient and at risk for osteoporosis based off medical history and other findings; (2) Have a vertebral abnormality; (3) On glucocorticoid therapy for more than 3 months; (4) Have primary hyperparathyroidism; (5) On osteoporosis medications and need to assess response to drug therapy.   Last bone density test (DXA Scan): Not on file.  HIV Screening: covered annually if you're between the age of 15-65. Also covered annually if you are younger than 15 and older than 65 with risk factors for HIV infection. For pregnant patients, it is covered up to 3 times per  pregnancy.    Immunizations:  Immunization Recommendations   Influenza Vaccine Annual influenza vaccination during flu season is recommended for all persons aged >= 6 months who do not have contraindications   Pneumococcal Vaccine   * Pneumococcal conjugate vaccine = PCV13 (Prevnar 13), PCV15 (Vaxneuvance), PCV20 (Prevnar 20)  * Pneumococcal polysaccharide vaccine = PPSV23 (Pneumovax) Adults 19-65 yo with certain risk factors or if 65+ yo  If never received any pneumonia vaccine: recommend Prevnar 20 (PCV20)  Give PCV20 if previously received 1 dose of PCV13 or PPSV23   Hepatitis B Vaccine 3 dose series if at intermediate or high risk (ex: diabetes, end stage renal disease, liver disease)   Respiratory syncytial virus (RSV) Vaccine - COVERED BY MEDICARE PART D  * RSVPreF3 (Arexvy) CDC recommends that adults 60 years of age and older may receive a single dose of RSV vaccine using shared clinical decision-making (SCDM)   Tetanus (Td) Vaccine - COST NOT COVERED BY MEDICARE PART B Following completion of primary series, a booster dose should be given every 10 years to maintain immunity against tetanus. Td may also be given as tetanus wound prophylaxis.   Tdap Vaccine - COST NOT COVERED BY MEDICARE PART B Recommended at least once for all adults. For pregnant patients, recommended with each pregnancy.   Shingles Vaccine (Shingrix) - COST NOT COVERED BY MEDICARE PART B  2 shot series recommended in those 19 years and older who have or will have weakened immune systems or those 50 years and older     Health Maintenance Due:      Topic Date Due    Hepatitis C Screening  Never done    Breast Cancer Screening: Mammogram  Never done    Colorectal Cancer Screening  Never done     Immunizations Due:      Topic Date Due    Pneumococcal Vaccine: 65+ Years (1 of 2 - PCV) Never done    Hepatitis A Vaccine (1 of 2 - Risk 2-dose series) Never done    COVID-19 Vaccine (4 - 2023-24 season) 09/01/2023    Influenza Vaccine (1) 09/01/2024      Advance Directives   What are advance directives?  Advance directives are legal documents that state your wishes and plans for medical care. These plans are made ahead of time in case you lose your ability to make decisions for yourself. Advance directives can apply to any medical decision, such as the treatments you want, and if you want to donate organs.   What are the types of advance directives?  There are many types of advance directives, and each state has rules about how to use them. You may choose a combination of any of the following:  Living will:  This is a written record of the treatment you want. You can also choose which treatments you do not want, which to limit, and which to stop at a certain time. This includes surgery, medicine, IV fluid, and tube feedings.   Durable power of  for healthcare (DPAHC):  This is a written record that states who you want to make healthcare choices for you when you are unable to make them for yourself. This person, called a proxy, is usually a family member or a friend. You may choose more than 1 proxy.  Do not resuscitate (DNR) order:  A DNR order is used in case your heart stops beating or you stop breathing. It is a request not to have certain forms of treatment, such as CPR. A DNR order may be included in other types of advance directives.  Medical directive:  This covers the care that you want if you are in a coma, near death, or unable to make decisions for yourself. You can list the treatments you want for each condition. Treatment may include pain medicine, surgery, blood transfusions, dialysis, IV or tube feedings, and a ventilator (breathing machine).  Values history:  This document has questions about your views, beliefs, and how you feel and think about life. This information can help others choose the care that you would choose.  Why are advance directives important?  An advance directive helps you control your care. Although spoken wishes may  be used, it is better to have your wishes written down. Spoken wishes can be misunderstood, or not followed. Treatments may be given even if you do not want them. An advance directive may make it easier for your family to make difficult choices about your care.   Weight Management   Why it is important to manage your weight:  Being overweight increases your risk of health conditions such as heart disease, high blood pressure, type 2 diabetes, and certain types of cancer. It can also increase your risk for osteoarthritis, sleep apnea, and other respiratory problems. Aim for a slow, steady weight loss. Even a small amount of weight loss can lower your risk of health problems.  How to lose weight safely:  A safe and healthy way to lose weight is to eat fewer calories and get regular exercise. You can lose up about 1 pound a week by decreasing the number of calories you eat by 500 calories each day.   Healthy meal plan for weight management:  A healthy meal plan includes a variety of foods, contains fewer calories, and helps you stay healthy. A healthy meal plan includes the following:  Eat whole-grain foods more often.  A healthy meal plan should contain fiber. Fiber is the part of grains, fruits, and vegetables that is not broken down by your body. Whole-grain foods are healthy and provide extra fiber in your diet. Some examples of whole-grain foods are whole-wheat breads and pastas, oatmeal, brown rice, and bulgur.  Eat a variety of vegetables every day.  Include dark, leafy greens such as spinach, kale, jose luis greens, and mustard greens. Eat yellow and orange vegetables such as carrots, sweet potatoes, and winter squash.   Eat a variety of fruits every day.  Choose fresh or canned fruit (canned in its own juice or light syrup) instead of juice. Fruit juice has very little or no fiber.  Eat low-fat dairy foods.  Drink fat-free (skim) milk or 1% milk. Eat fat-free yogurt and low-fat cottage cheese. Try low-fat  cheeses such as mozzarella and other reduced-fat cheeses.  Choose meat and other protein foods that are low in fat.  Choose beans or other legumes such as split peas or lentils. Choose fish, skinless poultry (chicken or turkey), or lean cuts of red meat (beef or pork). Before you cook meat or poultry, cut off any visible fat.   Use less fat and oil.  Try baking foods instead of frying them. Add less fat, such as margarine, sour cream, regular salad dressing and mayonnaise to foods. Eat fewer high-fat foods. Some examples of high-fat foods include french fries, doughnuts, ice cream, and cakes.  Eat fewer sweets.  Limit foods and drinks that are high in sugar. This includes candy, cookies, regular soda, and sweetened drinks.  Exercise:  Exercise at least 30 minutes per day on most days of the week. Some examples of exercise include walking, biking, dancing, and swimming. You can also fit in more physical activity by taking the stairs instead of the elevator or parking farther away from stores. Ask your healthcare provider about the best exercise plan for you.      © Copyright [employer] 2018 Information is for End User's use only and may not be sold, redistributed or otherwise used for commercial purposes. All illustrations and images included in CareNotes® are the copyrighted property of A.D.A.M., Inc. or [employer].  Overall patient is very functional.  Will set up for bone density and patient is going to get mammogram done.  Will send Cologuard to house.  Did discuss pneumococcal vaccine and patient to consider getting this at pharmacy.  Try to push daily physical activity and watch the starch in the diet.  Is continuing following up with urology and cardiology

## 2024-08-29 NOTE — PROGRESS NOTES
Ambulatory Visit  Name: Radha Dodson      : 1950      MRN: [id number]  Encounter Provider: Ketan Mahmood MD  Encounter Date: 2024   Encounter department: [facility] PRIMARY CARE    Assessment & Plan   1. Prediabetes  Assessment & Plan:  Doing well with dietary adjustment.  Try to push daily physical activity  2. Encounter for screening mammogram for breast cancer  -     Mammo screening bilateral w 3d & cad; Future; Expected date: 2024  3. Asymptomatic postmenopausal state  -     DXA bone density spine hip and pelvis; Future; Expected date: 2024  4. Medicare annual wellness visit, subsequent  5. Encounter for colorectal cancer screening  -     Cologuard  6. Hypertriglyceridemia  Assessment & Plan:  Appears stable, continue current regimen  7. Cervical stump prolapse  Assessment & Plan:  Is following up with urology.  Is much less symptomatic  8. Paroxysmal atrial fibrillation (HCC)  Assessment & Plan:  Is now on Xarelto and is being considered for ablation.  Continue follow-up with cardiology       Preventive health issues were discussed with patient, and age appropriate screening tests were ordered as noted in patient's After Visit Summary. Personalized health advice and appropriate referrals for health education or preventive services given if needed, as noted in patient's After Visit Summary.    History of Present Illness       Patient seen for first office check.  Had been feeling well and developed left-sided weakness and was hospitalized with stroke and the right middle cerebral artery.  This was embolic in nature and the clot was removed and has had complete resolution of symptoms.  Was found to have hydronephrosis in the hospital and also question about possible prediabetes.  Has followed up with urology and cardiology.  Did show problems with paroxysmal atrial fibrillation and is now on Xarelto and has been watching diet better       Patient Care  Team:  Ketan Mahmood MD as PCP - General (Family Medicine)    Review of Systems   Constitutional:  Negative for activity change, appetite change, chills, fatigue and fever.   HENT:  Negative for congestion, ear pain and sore throat.    Eyes:  Negative for pain and visual disturbance.   Respiratory:  Negative for cough, chest tightness and shortness of breath.    Cardiovascular:  Negative for chest pain, palpitations and leg swelling.   Gastrointestinal:  Negative for abdominal pain and vomiting.   Endocrine: Negative for polyuria.   Genitourinary:  Negative for enuresis and hematuria.   Musculoskeletal:  Negative for arthralgias and back pain.   Skin:  Negative for color change and rash.   Allergic/Immunologic: Negative for environmental allergies.   Neurological:  Negative for seizures and syncope.   Hematological:  Negative for adenopathy.   Psychiatric/Behavioral:  Negative for agitation and sleep disturbance.    All other systems reviewed and are negative.    Medical History Reviewed by provider this encounter:  Tobacco  Allergies  Meds  Problems  Med Hx  Surg Hx  Fam Hx       Annual Wellness Visit Questionnaire   Radha is here for her Subsequent Wellness visit. Last Medicare Wellness visit information reviewed, patient interviewed and updates made to the record today.      Health Risk Assessment:   Patient rates overall health as very good. Patient feels that their physical health rating is same. Patient is very satisfied with their life. Eyesight was rated as same. Hearing was rated as same. Patient feels that their emotional and mental health rating is same. Patients states they are never, rarely angry. Patient states they are never, rarely unusually tired/fatigued. Pain experienced in the last 7 days has been none. Patient states that she has experienced no weight loss or gain in last 6 months. No issues    Depression Screening:   PHQ-2 Score: 0      Fall Risk Screening:   In the past year,  patient has experienced: no history of falling in past year      Urinary Incontinence Screening:   Patient has not leaked urine accidently in the last six months. No issues    Home Safety:  Patient does not have trouble with stairs inside or outside of their home. Patient has working smoke alarms and has working carbon monoxide detector. Home safety hazards include: none. I did advise grab bars in the bathroom    Nutrition:   Current diet is Regular. No issues ,watch starch in diet    Medications:   Patient is currently taking over-the-counter supplements. OTC medications include: see medication list. Patient is able to manage medications. No issues    Activities of Daily Living (ADLs)/Instrumental Activities of Daily Living (IADLs):   Walk and transfer into and out of bed and chair?: Yes  Dress and groom yourself?: Yes    Bathe or shower yourself?: Yes    Feed yourself? Yes  Do your laundry/housekeeping?: Yes  Manage your money, pay your bills and track your expenses?: Yes  Make your own meals?: Yes    Do your own shopping?: Yes    ADL comments: Overall functions well at home.  No issues    Previous Hospitalizations:   Any hospitalizations or ED visits within the last 12 months?: Yes    How many hospitalizations have you had in the last year?: 1-2    Hospitalization Comments: Does follow-up with [facility] for cardiology, neurology and urology    Advance Care Planning:   Living will: No    Durable POA for healthcare: No    Advanced directive: No    Advanced directive counseling given: Yes    ACP document given: Yes    End of Life Decisions reviewed with patient: No      Comments: Discussed the importance of doing this and I gave her a form for this    Cognitive Screening:   Provider or family/friend/caregiver concerned regarding cognition?: No    PREVENTIVE SCREENINGS      Cardiovascular Screening:    General: Screening Not Indicated and History Lipid Disorder      Diabetes Screening:     General: Screening  Current      Colorectal Cancer Screening:     General: Risks and Benefits Discussed    Due for: Cologuard      Breast Cancer Screening:       Due for: Mammogram        Cervical Cancer Screening:    General: Screening Not Indicated      Osteoporosis Screening:    General: Risks and Benefits Discussed    Due for: DXA Appendicular      Abdominal Aortic Aneurysm (AAA) Screening:        General: Screening Not Indicated      Lung Cancer Screening:     General: Screening Not Indicated      Hepatitis C Screening:    General: Risks and Benefits Discussed      Preventive Screening Comments: Follow-up with routine eye care and have reports forwarded to office    Screening, Brief Intervention, and Referral to Treatment (SBIRT)    Screening  Typical number of drinks in a day: 0  Typical number of drinks in a week: 0  Interpretation: Low risk drinking behavior.    Single Item Drug Screening:  How often have you used an illegal drug (including marijuana) or a prescription medication for non-medical reasons in the past year? never    Single Item Drug Screen Score: 0  Interpretation: Negative screen for possible drug use disorder    Brief Intervention  Alcohol & drug use screenings were reviewed. No concerns regarding substance use disorder identified.     Annual Depression Screening  Time spent screening and evaluating the patient for depression during today's encounter was 5 minutes.    Social Determinants of Health     Food Insecurity: No Food Insecurity (8/29/2024)    Hunger Vital Sign     Worried About Running Out of Food in the Last Year: Never true     Ran Out of Food in the Last Year: Never true   Transportation Needs: No Transportation Needs (8/29/2024)    PRAPARE - Transportation     Lack of Transportation (Medical): No     Lack of Transportation (Non-Medical): No   Housing Stability: Low Risk  (8/29/2024)    Housing Stability Vital Sign     Unable to Pay for Housing in the Last Year: No     Number of Times Moved in the  Last Year: 1     Homeless in the Last Year: No   Utilities: Not At Risk (8/29/2024)    [facility] Utilities     Threatened with loss of utilities: No     No results found.    Objective     Blood Pressure 130/88 (BP Location: Left arm, Patient Position: Sitting, Cuff Size: Large)   Height 5' (1.524 m)   Weight 65.9 kg (145 lb 3.2 oz)   Body Mass Index 28.36 kg/m²     Physical Exam  Vitals and nursing note reviewed.   Constitutional:       General: She is not in acute distress.     Appearance: Normal appearance. She is well-developed.   HENT:      Head: Normocephalic and atraumatic.      Right Ear: Tympanic membrane, ear canal and external ear normal. Decreased hearing (25 dB hearing screen is off.  No difficulty with conversation) noted.      Left Ear: Tympanic membrane, ear canal and external ear normal. Decreased hearing (25 dB hearing screen off at 4000 Hz) noted.      Nose: Nose normal.      Mouth/Throat:      Mouth: Mucous membranes are moist.      Dentition: Normal dentition.   Eyes:      Extraocular Movements: Extraocular movements intact.      Conjunctiva/sclera: Conjunctivae normal.      Pupils: Pupils are equal, round, and reactive to light.   Neck:      Vascular: No carotid bruit.   Cardiovascular:      Rate and Rhythm: Normal rate and regular rhythm.      Pulses:           Dorsalis pedis pulses are 2+ on the right side and 2+ on the left side.        Posterior tibial pulses are 1+ on the right side and 1+ on the left side.      Heart sounds: No murmur (Rate is 72) heard.  Pulmonary:      Effort: Pulmonary effort is normal. No respiratory distress.      Breath sounds: Normal breath sounds.   Abdominal:      General: Abdomen is flat.      Palpations: Abdomen is soft.      Tenderness: There is no abdominal tenderness.   Musculoskeletal:         General: No swelling.      Cervical back: Neck supple. No tenderness.      Right lower leg: No edema.      Left lower leg: No edema.      Right foot: No deformity.       Left foot: No deformity.   Feet:      Right foot:      Protective Sensation: 8 sites tested.  8 sites sensed.      Skin integrity: Skin integrity normal.      Toenail Condition: Fungal disease present.     Left foot:      Protective Sensation: 8 sites tested.  8 sites sensed.      Skin integrity: Skin integrity normal.      Toenail Condition: Fungal disease present.  Skin:     General: Skin is warm and dry.      Capillary Refill: Capillary refill takes less than 2 seconds.      Findings: No rash.   Neurological:      General: No focal deficit present.      Mental Status: She is alert.      Cranial Nerves: No cranial nerve deficit.      Sensory: No sensory deficit.      Motor: No weakness.      Coordination: Coordination normal.      Gait: Gait normal.      Deep Tendon Reflexes: Reflexes abnormal (Reflexes 1+).   Psychiatric:         Mood and Affect: Mood normal.         Behavior: Behavior normal.         Thought Content: Thought content normal.         Judgment: Judgment normal.

## 2024-09-16 ENCOUNTER — TELEPHONE (OUTPATIENT)
Age: 74
End: 2024-09-16

## 2024-09-27 ENCOUNTER — HOSPITAL ENCOUNTER (INPATIENT)
Facility: HOSPITAL | Age: 74
LOS: 2 days | Discharge: HOME | DRG: 308 | End: 2024-09-29
Attending: EMERGENCY MEDICINE | Admitting: STUDENT IN AN ORGANIZED HEALTH CARE EDUCATION/TRAINING PROGRAM
Payer: COMMERCIAL

## 2024-09-27 ENCOUNTER — APPOINTMENT (EMERGENCY)
Dept: RADIOLOGY | Facility: HOSPITAL | Age: 74
DRG: 308 | End: 2024-09-27
Payer: COMMERCIAL

## 2024-09-27 DIAGNOSIS — I48.91 ATRIAL FIBRILLATION WITH RVR (HCC): Primary | ICD-10-CM

## 2024-09-27 DIAGNOSIS — I50.9 CHF (CONGESTIVE HEART FAILURE) (HCC): ICD-10-CM

## 2024-09-27 DIAGNOSIS — R73.03 PREDIABETES: ICD-10-CM

## 2024-09-27 PROBLEM — N17.9 AKI (ACUTE KIDNEY INJURY) (HCC): Status: RESOLVED | Noted: 2024-09-27 | Resolved: 2024-09-27

## 2024-09-27 PROBLEM — E87.20 LACTIC ACIDOSIS: Status: ACTIVE | Noted: 2024-09-27

## 2024-09-27 PROBLEM — R74.01 TRANSAMINITIS: Status: ACTIVE | Noted: 2024-09-27

## 2024-09-27 PROBLEM — R65.10 SIRS (SYSTEMIC INFLAMMATORY RESPONSE SYNDROME) (HCC): Status: ACTIVE | Noted: 2024-09-27

## 2024-09-27 PROBLEM — N17.9 AKI (ACUTE KIDNEY INJURY) (HCC): Status: ACTIVE | Noted: 2024-09-27

## 2024-09-27 PROBLEM — I50.33 ACUTE ON CHRONIC DIASTOLIC HEART FAILURE (HCC): Status: ACTIVE | Noted: 2024-09-27

## 2024-09-27 PROBLEM — I50.30 DIASTOLIC HEART FAILURE (HCC): Status: ACTIVE | Noted: 2024-09-27

## 2024-09-27 LAB
2HR DELTA HS TROPONIN: 5 NG/L
ALBUMIN SERPL BCG-MCNC: 3.9 G/DL (ref 3.5–5)
ALBUMIN SERPL BCG-MCNC: 4 G/DL (ref 3.5–5)
ALP SERPL-CCNC: 75 U/L (ref 34–104)
ALP SERPL-CCNC: 91 U/L (ref 34–104)
ALT SERPL W P-5'-P-CCNC: 53 U/L (ref 7–52)
ALT SERPL W P-5'-P-CCNC: 58 U/L (ref 7–52)
ANION GAP SERPL CALCULATED.3IONS-SCNC: 10 MMOL/L (ref 4–13)
ANION GAP SERPL CALCULATED.3IONS-SCNC: 12 MMOL/L (ref 4–13)
ANION GAP SERPL CALCULATED.3IONS-SCNC: 12 MMOL/L (ref 4–13)
APTT PPP: 30 SECONDS (ref 23–34)
AST SERPL W P-5'-P-CCNC: 42 U/L (ref 13–39)
AST SERPL W P-5'-P-CCNC: 46 U/L (ref 13–39)
ATRIAL RATE: 115 BPM
BASOPHILS # BLD AUTO: 0.06 THOUSANDS/ΜL (ref 0–0.1)
BASOPHILS # BLD MANUAL: 0 THOUSAND/UL (ref 0–0.1)
BASOPHILS NFR BLD AUTO: 1 % (ref 0–1)
BASOPHILS NFR MAR MANUAL: 0 % (ref 0–1)
BILIRUB DIRECT SERPL-MCNC: 0.28 MG/DL (ref 0–0.2)
BILIRUB SERPL-MCNC: 0.94 MG/DL (ref 0.2–1)
BILIRUB SERPL-MCNC: 1.36 MG/DL (ref 0.2–1)
BILIRUB UR QL STRIP: NEGATIVE
BNP SERPL-MCNC: 854 PG/ML (ref 0–100)
BUN SERPL-MCNC: 24 MG/DL (ref 5–25)
BUN SERPL-MCNC: 24 MG/DL (ref 5–25)
BUN SERPL-MCNC: 25 MG/DL (ref 5–25)
CA-I BLD-SCNC: 1.07 MMOL/L (ref 1.12–1.32)
CALCIUM SERPL-MCNC: 8.8 MG/DL (ref 8.4–10.2)
CALCIUM SERPL-MCNC: 9.2 MG/DL (ref 8.4–10.2)
CALCIUM SERPL-MCNC: 9.5 MG/DL (ref 8.4–10.2)
CARDIAC TROPONIN I PNL SERPL HS: 39 NG/L
CARDIAC TROPONIN I PNL SERPL HS: 44 NG/L
CHLORIDE SERPL-SCNC: 103 MMOL/L (ref 96–108)
CHLORIDE SERPL-SCNC: 103 MMOL/L (ref 96–108)
CHLORIDE SERPL-SCNC: 105 MMOL/L (ref 96–108)
CLARITY UR: CLEAR
CO2 SERPL-SCNC: 22 MMOL/L (ref 21–32)
CO2 SERPL-SCNC: 22 MMOL/L (ref 21–32)
CO2 SERPL-SCNC: 23 MMOL/L (ref 21–32)
COLOR UR: YELLOW
CREAT SERPL-MCNC: 1.26 MG/DL (ref 0.6–1.3)
CREAT SERPL-MCNC: 1.26 MG/DL (ref 0.6–1.3)
CREAT SERPL-MCNC: 1.34 MG/DL (ref 0.6–1.3)
EOSINOPHIL # BLD AUTO: 0.16 THOUSAND/ΜL (ref 0–0.61)
EOSINOPHIL # BLD MANUAL: 0.12 THOUSAND/UL (ref 0–0.4)
EOSINOPHIL NFR BLD AUTO: 2 % (ref 0–6)
EOSINOPHIL NFR BLD MANUAL: 1 % (ref 0–6)
ERYTHROCYTE [DISTWIDTH] IN BLOOD BY AUTOMATED COUNT: 13.3 % (ref 11.6–15.1)
ERYTHROCYTE [DISTWIDTH] IN BLOOD BY AUTOMATED COUNT: 13.5 % (ref 11.6–15.1)
EST. AVERAGE GLUCOSE BLD GHB EST-MCNC: 134 MG/DL
FLUAV AG UPPER RESP QL IA.RAPID: NEGATIVE
FLUBV AG UPPER RESP QL IA.RAPID: NEGATIVE
GFR SERPL CREATININE-BSD FRML MDRD: 39 ML/MIN/1.73SQ M
GFR SERPL CREATININE-BSD FRML MDRD: 42 ML/MIN/1.73SQ M
GFR SERPL CREATININE-BSD FRML MDRD: 42 ML/MIN/1.73SQ M
GLUCOSE SERPL-MCNC: 132 MG/DL (ref 65–140)
GLUCOSE SERPL-MCNC: 159 MG/DL (ref 65–140)
GLUCOSE SERPL-MCNC: 171 MG/DL (ref 65–140)
GLUCOSE SERPL-MCNC: 179 MG/DL (ref 65–140)
GLUCOSE SERPL-MCNC: 187 MG/DL (ref 65–140)
GLUCOSE SERPL-MCNC: 273 MG/DL (ref 65–140)
GLUCOSE SERPL-MCNC: 300 MG/DL (ref 65–140)
GLUCOSE UR STRIP-MCNC: NEGATIVE MG/DL
HBA1C MFR BLD: 6.3 %
HCT VFR BLD AUTO: 44.1 % (ref 34.8–46.1)
HCT VFR BLD AUTO: 44.1 % (ref 34.8–46.1)
HGB BLD-MCNC: 14.3 G/DL (ref 11.5–15.4)
HGB BLD-MCNC: 14.8 G/DL (ref 11.5–15.4)
HGB UR QL STRIP.AUTO: NEGATIVE
IMM GRANULOCYTES # BLD AUTO: 0.02 THOUSAND/UL (ref 0–0.2)
IMM GRANULOCYTES NFR BLD AUTO: 0 % (ref 0–2)
INR PPP: 1.86 (ref 0.85–1.19)
KETONES UR STRIP-MCNC: NEGATIVE MG/DL
LACTATE SERPL-SCNC: 2.2 MMOL/L (ref 0.5–2)
LACTATE SERPL-SCNC: 2.3 MMOL/L (ref 0.5–2)
LACTATE SERPL-SCNC: 3.6 MMOL/L (ref 0.5–2)
LACTATE SERPL-SCNC: 4.2 MMOL/L (ref 0.5–2)
LEUKOCYTE ESTERASE UR QL STRIP: NEGATIVE
LIPASE SERPL-CCNC: 31 U/L (ref 11–82)
LYMPHOCYTES # BLD AUTO: 0.62 THOUSAND/UL (ref 0.6–4.47)
LYMPHOCYTES # BLD AUTO: 3.42 THOUSANDS/ΜL (ref 0.6–4.47)
LYMPHOCYTES # BLD AUTO: 5 % (ref 14–44)
LYMPHOCYTES NFR BLD AUTO: 40 % (ref 14–44)
MAGNESIUM SERPL-MCNC: 2 MG/DL (ref 1.9–2.7)
MAGNESIUM SERPL-MCNC: 2.1 MG/DL (ref 1.9–2.7)
MCH RBC QN AUTO: 30 PG (ref 26.8–34.3)
MCH RBC QN AUTO: 30.3 PG (ref 26.8–34.3)
MCHC RBC AUTO-ENTMCNC: 32.4 G/DL (ref 31.4–37.4)
MCHC RBC AUTO-ENTMCNC: 33.6 G/DL (ref 31.4–37.4)
MCV RBC AUTO: 90 FL (ref 82–98)
MCV RBC AUTO: 93 FL (ref 82–98)
MONOCYTES # BLD AUTO: 0.25 THOUSAND/UL (ref 0–1.22)
MONOCYTES # BLD AUTO: 0.9 THOUSAND/ΜL (ref 0.17–1.22)
MONOCYTES NFR BLD AUTO: 11 % (ref 4–12)
MONOCYTES NFR BLD: 2 % (ref 4–12)
NEUTROPHILS # BLD AUTO: 3.91 THOUSANDS/ΜL (ref 1.85–7.62)
NEUTROPHILS # BLD MANUAL: 11.41 THOUSAND/UL (ref 1.85–7.62)
NEUTS SEG NFR BLD AUTO: 46 % (ref 43–75)
NEUTS SEG NFR BLD AUTO: 92 % (ref 43–75)
NITRITE UR QL STRIP: NEGATIVE
NRBC BLD AUTO-RTO: 0 /100 WBCS
PH UR STRIP.AUTO: 6 [PH]
PHOSPHATE SERPL-MCNC: 3.5 MG/DL (ref 2.3–4.1)
PLATELET # BLD AUTO: 344 THOUSANDS/UL (ref 149–390)
PLATELET # BLD AUTO: 347 THOUSANDS/UL (ref 149–390)
PLATELET BLD QL SMEAR: ADEQUATE
PMV BLD AUTO: 10.3 FL (ref 8.9–12.7)
PMV BLD AUTO: 10.7 FL (ref 8.9–12.7)
POTASSIUM SERPL-SCNC: 4.1 MMOL/L (ref 3.5–5.3)
POTASSIUM SERPL-SCNC: 4.1 MMOL/L (ref 3.5–5.3)
POTASSIUM SERPL-SCNC: 4.5 MMOL/L (ref 3.5–5.3)
PROT SERPL-MCNC: 6.9 G/DL (ref 6.4–8.4)
PROT SERPL-MCNC: 7 G/DL (ref 6.4–8.4)
PROT UR STRIP-MCNC: NEGATIVE MG/DL
PROTHROMBIN TIME: 21.7 SECONDS (ref 12.3–15)
QRS AXIS: -32 DEGREES
QRS AXIS: -56 DEGREES
QRS AXIS: -58 DEGREES
QRSD INTERVAL: 68 MS
QRSD INTERVAL: 80 MS
QRSD INTERVAL: 84 MS
QT INTERVAL: 256 MS
QT INTERVAL: 266 MS
QT INTERVAL: 316 MS
QTC INTERVAL: 401 MS
QTC INTERVAL: 443 MS
QTC INTERVAL: 477 MS
RBC # BLD AUTO: 4.77 MILLION/UL (ref 3.81–5.12)
RBC # BLD AUTO: 4.88 MILLION/UL (ref 3.81–5.12)
RBC MORPH BLD: NORMAL
SARS-COV+SARS-COV-2 AG RESP QL IA.RAPID: NEGATIVE
SODIUM SERPL-SCNC: 136 MMOL/L (ref 135–147)
SODIUM SERPL-SCNC: 137 MMOL/L (ref 135–147)
SODIUM SERPL-SCNC: 139 MMOL/L (ref 135–147)
SP GR UR STRIP.AUTO: 1.02 (ref 1–1.03)
T WAVE AXIS: -67 DEGREES
T WAVE AXIS: -67 DEGREES
T WAVE AXIS: 248 DEGREES
TSH SERPL DL<=0.05 MIU/L-ACNC: 2.12 UIU/ML (ref 0.45–4.5)
UROBILINOGEN UR QL STRIP.AUTO: 0.2 E.U./DL
VENTRICULAR RATE: 137 BPM
VENTRICULAR RATE: 148 BPM
VENTRICULAR RATE: 167 BPM
WBC # BLD AUTO: 12.4 THOUSAND/UL (ref 4.31–10.16)
WBC # BLD AUTO: 8.47 THOUSAND/UL (ref 4.31–10.16)

## 2024-09-27 PROCEDURE — 96374 THER/PROPH/DIAG INJ IV PUSH: CPT

## 2024-09-27 PROCEDURE — 84443 ASSAY THYROID STIM HORMONE: CPT | Performed by: NURSE PRACTITIONER

## 2024-09-27 PROCEDURE — 82948 REAGENT STRIP/BLOOD GLUCOSE: CPT

## 2024-09-27 PROCEDURE — 83605 ASSAY OF LACTIC ACID: CPT

## 2024-09-27 PROCEDURE — 81003 URINALYSIS AUTO W/O SCOPE: CPT

## 2024-09-27 PROCEDURE — 71045 X-RAY EXAM CHEST 1 VIEW: CPT

## 2024-09-27 PROCEDURE — 83735 ASSAY OF MAGNESIUM: CPT

## 2024-09-27 PROCEDURE — 84484 ASSAY OF TROPONIN QUANT: CPT | Performed by: EMERGENCY MEDICINE

## 2024-09-27 PROCEDURE — 84100 ASSAY OF PHOSPHORUS: CPT

## 2024-09-27 PROCEDURE — 87804 INFLUENZA ASSAY W/OPTIC: CPT | Performed by: EMERGENCY MEDICINE

## 2024-09-27 PROCEDURE — 96375 TX/PRO/DX INJ NEW DRUG ADDON: CPT

## 2024-09-27 PROCEDURE — 99223 1ST HOSP IP/OBS HIGH 75: CPT | Performed by: STUDENT IN AN ORGANIZED HEALTH CARE EDUCATION/TRAINING PROGRAM

## 2024-09-27 PROCEDURE — 85730 THROMBOPLASTIN TIME PARTIAL: CPT | Performed by: EMERGENCY MEDICINE

## 2024-09-27 PROCEDURE — 5A2204Z RESTORATION OF CARDIAC RHYTHM, SINGLE: ICD-10-PCS | Performed by: EMERGENCY MEDICINE

## 2024-09-27 PROCEDURE — 99222 1ST HOSP IP/OBS MODERATE 55: CPT | Performed by: INTERNAL MEDICINE

## 2024-09-27 PROCEDURE — 87040 BLOOD CULTURE FOR BACTERIA: CPT | Performed by: EMERGENCY MEDICINE

## 2024-09-27 PROCEDURE — 83605 ASSAY OF LACTIC ACID: CPT | Performed by: EMERGENCY MEDICINE

## 2024-09-27 PROCEDURE — 96376 TX/PRO/DX INJ SAME DRUG ADON: CPT

## 2024-09-27 PROCEDURE — 96365 THER/PROPH/DIAG IV INF INIT: CPT

## 2024-09-27 PROCEDURE — 93010 ELECTROCARDIOGRAM REPORT: CPT | Performed by: INTERNAL MEDICINE

## 2024-09-27 PROCEDURE — 82330 ASSAY OF CALCIUM: CPT

## 2024-09-27 PROCEDURE — 83880 ASSAY OF NATRIURETIC PEPTIDE: CPT | Performed by: EMERGENCY MEDICINE

## 2024-09-27 PROCEDURE — 83036 HEMOGLOBIN GLYCOSYLATED A1C: CPT

## 2024-09-27 PROCEDURE — 36415 COLL VENOUS BLD VENIPUNCTURE: CPT | Performed by: EMERGENCY MEDICINE

## 2024-09-27 PROCEDURE — 83690 ASSAY OF LIPASE: CPT | Performed by: EMERGENCY MEDICINE

## 2024-09-27 PROCEDURE — 93005 ELECTROCARDIOGRAM TRACING: CPT

## 2024-09-27 PROCEDURE — 85007 BL SMEAR W/DIFF WBC COUNT: CPT

## 2024-09-27 PROCEDURE — 87811 SARS-COV-2 COVID19 W/OPTIC: CPT | Performed by: EMERGENCY MEDICINE

## 2024-09-27 PROCEDURE — 80076 HEPATIC FUNCTION PANEL: CPT | Performed by: NURSE PRACTITIONER

## 2024-09-27 PROCEDURE — 99285 EMERGENCY DEPT VISIT HI MDM: CPT

## 2024-09-27 PROCEDURE — 85025 COMPLETE CBC W/AUTO DIFF WBC: CPT | Performed by: EMERGENCY MEDICINE

## 2024-09-27 PROCEDURE — 99291 CRITICAL CARE FIRST HOUR: CPT | Performed by: EMERGENCY MEDICINE

## 2024-09-27 PROCEDURE — 80048 BASIC METABOLIC PNL TOTAL CA: CPT

## 2024-09-27 PROCEDURE — 85027 COMPLETE CBC AUTOMATED: CPT

## 2024-09-27 PROCEDURE — 83735 ASSAY OF MAGNESIUM: CPT | Performed by: EMERGENCY MEDICINE

## 2024-09-27 PROCEDURE — 85610 PROTHROMBIN TIME: CPT | Performed by: EMERGENCY MEDICINE

## 2024-09-27 PROCEDURE — 80053 COMPREHEN METABOLIC PANEL: CPT | Performed by: EMERGENCY MEDICINE

## 2024-09-27 PROCEDURE — 92960 CARDIOVERSION ELECTRIC EXT: CPT | Performed by: EMERGENCY MEDICINE

## 2024-09-27 RX ORDER — INSULIN LISPRO 100 [IU]/ML
1-5 INJECTION, SOLUTION INTRAVENOUS; SUBCUTANEOUS
Status: DISCONTINUED | OUTPATIENT
Start: 2024-09-27 | End: 2024-09-29 | Stop reason: HOSPADM

## 2024-09-27 RX ORDER — CALCIUM CARBONATE 500(1250)
1 TABLET ORAL
Status: DISCONTINUED | OUTPATIENT
Start: 2024-09-28 | End: 2024-09-29 | Stop reason: HOSPADM

## 2024-09-27 RX ORDER — METOPROLOL TARTRATE 1 MG/ML
5 INJECTION, SOLUTION INTRAVENOUS ONCE
Status: COMPLETED | OUTPATIENT
Start: 2024-09-27 | End: 2024-09-27

## 2024-09-27 RX ORDER — IPRATROPIUM BROMIDE AND ALBUTEROL SULFATE .5; 3 MG/3ML; MG/3ML
1 SOLUTION RESPIRATORY (INHALATION) ONCE
Status: COMPLETED | OUTPATIENT
Start: 2024-09-27 | End: 2024-09-27

## 2024-09-27 RX ORDER — METOPROLOL SUCCINATE 25 MG/1
25 TABLET, EXTENDED RELEASE ORAL DAILY
Status: DISCONTINUED | OUTPATIENT
Start: 2024-09-27 | End: 2024-09-29 | Stop reason: HOSPADM

## 2024-09-27 RX ORDER — FUROSEMIDE 10 MG/ML
20 INJECTION INTRAMUSCULAR; INTRAVENOUS ONCE
Status: COMPLETED | OUTPATIENT
Start: 2024-09-27 | End: 2024-09-27

## 2024-09-27 RX ORDER — METHYLPREDNISOLONE SOD SUCC 125 MG
1 VIAL (EA) INJECTION ONCE
Status: COMPLETED | OUTPATIENT
Start: 2024-09-27 | End: 2024-09-27

## 2024-09-27 RX ORDER — DIGOXIN 0.25 MG/ML
250 INJECTION INTRAMUSCULAR; INTRAVENOUS ONCE
Status: COMPLETED | OUTPATIENT
Start: 2024-09-27 | End: 2024-09-27

## 2024-09-27 RX ORDER — INSULIN LISPRO 100 [IU]/ML
1-5 INJECTION, SOLUTION INTRAVENOUS; SUBCUTANEOUS EVERY 6 HOURS SCHEDULED
Status: DISCONTINUED | OUTPATIENT
Start: 2024-09-27 | End: 2024-09-27

## 2024-09-27 RX ORDER — CHLORHEXIDINE GLUCONATE ORAL RINSE 1.2 MG/ML
15 SOLUTION DENTAL EVERY 12 HOURS SCHEDULED
Status: DISCONTINUED | OUTPATIENT
Start: 2024-09-27 | End: 2024-09-29 | Stop reason: HOSPADM

## 2024-09-27 RX ORDER — METOPROLOL TARTRATE 1 MG/ML
5 INJECTION, SOLUTION INTRAVENOUS EVERY 6 HOURS PRN
Status: DISCONTINUED | OUTPATIENT
Start: 2024-09-27 | End: 2024-09-27

## 2024-09-27 RX ORDER — ATORVASTATIN CALCIUM 40 MG/1
40 TABLET, FILM COATED ORAL
Status: DISCONTINUED | OUTPATIENT
Start: 2024-09-27 | End: 2024-09-29 | Stop reason: HOSPADM

## 2024-09-27 RX ORDER — ALBUTEROL SULFATE 2.5 MG/3ML
1 SOLUTION RESPIRATORY (INHALATION) ONCE
Status: COMPLETED | OUTPATIENT
Start: 2024-09-27 | End: 2024-09-27

## 2024-09-27 RX ORDER — CALCIUM CARBONATE 500 MG/1
500 TABLET, CHEWABLE ORAL DAILY PRN
Status: DISCONTINUED | OUTPATIENT
Start: 2024-09-27 | End: 2024-09-27

## 2024-09-27 RX ORDER — CALCIUM GLUCONATE 20 MG/ML
2 INJECTION, SOLUTION INTRAVENOUS ONCE
Status: COMPLETED | OUTPATIENT
Start: 2024-09-27 | End: 2024-09-27

## 2024-09-27 RX ORDER — METOPROLOL TARTRATE 1 MG/ML
5 INJECTION, SOLUTION INTRAVENOUS ONCE
Status: DISCONTINUED | OUTPATIENT
Start: 2024-09-27 | End: 2024-09-27

## 2024-09-27 RX ORDER — DILTIAZEM HYDROCHLORIDE 5 MG/ML
15 INJECTION INTRAVENOUS ONCE
Status: COMPLETED | OUTPATIENT
Start: 2024-09-27 | End: 2024-09-27

## 2024-09-27 RX ORDER — ETOMIDATE 2 MG/ML
0.3 INJECTION INTRAVENOUS ONCE
Status: COMPLETED | OUTPATIENT
Start: 2024-09-27 | End: 2024-09-27

## 2024-09-27 RX ORDER — CALCIUM CARBONATE 500 MG/1
500 TABLET, CHEWABLE ORAL ONCE
Status: DISCONTINUED | OUTPATIENT
Start: 2024-09-27 | End: 2024-09-27

## 2024-09-27 RX ORDER — METOPROLOL TARTRATE 1 MG/ML
5 INJECTION, SOLUTION INTRAVENOUS EVERY 4 HOURS PRN
Status: DISCONTINUED | OUTPATIENT
Start: 2024-09-27 | End: 2024-09-29 | Stop reason: HOSPADM

## 2024-09-27 RX ADMIN — AMIODARONE HYDROCHLORIDE 1 MG/MIN: 50 INJECTION, SOLUTION INTRAVENOUS at 08:46

## 2024-09-27 RX ADMIN — METOROPROLOL TARTRATE 5 MG: 5 INJECTION, SOLUTION INTRAVENOUS at 02:15

## 2024-09-27 RX ADMIN — DILTIAZEM HYDROCHLORIDE 5 MG/HR: 5 INJECTION INTRAVENOUS at 03:49

## 2024-09-27 RX ADMIN — METOPROLOL SUCCINATE 25 MG: 25 TABLET, EXTENDED RELEASE ORAL at 08:59

## 2024-09-27 RX ADMIN — CHLORHEXIDINE GLUCONATE 15 ML: 1.2 RINSE ORAL at 21:16

## 2024-09-27 RX ADMIN — METOPROLOL TARTRATE 5 MG: 5 INJECTION INTRAVENOUS at 17:11

## 2024-09-27 RX ADMIN — INSULIN LISPRO 1 UNITS: 100 INJECTION, SOLUTION INTRAVENOUS; SUBCUTANEOUS at 08:54

## 2024-09-27 RX ADMIN — DILTIAZEM HYDROCHLORIDE 15 MG: 5 INJECTION INTRAVENOUS at 02:15

## 2024-09-27 RX ADMIN — METOROPROLOL TARTRATE 5 MG: 5 INJECTION, SOLUTION INTRAVENOUS at 11:37

## 2024-09-27 RX ADMIN — INSULIN LISPRO 1 UNITS: 100 INJECTION, SOLUTION INTRAVENOUS; SUBCUTANEOUS at 17:11

## 2024-09-27 RX ADMIN — ETOMIDATE 20 MG: 20 INJECTION, SOLUTION INTRAVENOUS at 04:25

## 2024-09-27 RX ADMIN — SODIUM CHLORIDE 250 ML: 0.9 INJECTION, SOLUTION INTRAVENOUS at 03:23

## 2024-09-27 RX ADMIN — METOPROLOL TARTRATE 5 MG: 5 INJECTION INTRAVENOUS at 04:40

## 2024-09-27 RX ADMIN — METOPROLOL TARTRATE 5 MG: 5 INJECTION INTRAVENOUS at 02:53

## 2024-09-27 RX ADMIN — CALCIUM GLUCONATE 2 G: 20 INJECTION, SOLUTION INTRAVENOUS at 09:08

## 2024-09-27 RX ADMIN — DIGOXIN 250 MCG: 0.25 INJECTION INTRAMUSCULAR; INTRAVENOUS at 04:48

## 2024-09-27 RX ADMIN — INSULIN LISPRO 1 UNITS: 100 INJECTION, SOLUTION INTRAVENOUS; SUBCUTANEOUS at 21:17

## 2024-09-27 RX ADMIN — AMIODARONE HYDROCHLORIDE 150 MG: 50 INJECTION, SOLUTION INTRAVENOUS at 20:58

## 2024-09-27 RX ADMIN — RIVAROXABAN 15 MG: 15 TABLET, FILM COATED ORAL at 07:46

## 2024-09-27 RX ADMIN — FUROSEMIDE 20 MG: 10 INJECTION, SOLUTION INTRAMUSCULAR; INTRAVENOUS at 05:08

## 2024-09-27 RX ADMIN — DIGOXIN 250 MCG: 0.25 INJECTION INTRAMUSCULAR; INTRAVENOUS at 02:35

## 2024-09-27 RX ADMIN — DIGOXIN 250 MCG: 0.25 INJECTION INTRAMUSCULAR; INTRAVENOUS at 03:06

## 2024-09-27 RX ADMIN — INSULIN LISPRO 1 UNITS: 100 INJECTION, SOLUTION INTRAVENOUS; SUBCUTANEOUS at 11:45

## 2024-09-27 RX ADMIN — ATORVASTATIN CALCIUM 40 MG: 40 TABLET, FILM COATED ORAL at 15:38

## 2024-09-27 RX ADMIN — DEXTROSE 150 MG: 50 INJECTION, SOLUTION INTRAVENOUS at 08:45

## 2024-09-27 RX ADMIN — CHLORHEXIDINE GLUCONATE 15 ML: 1.2 RINSE ORAL at 08:54

## 2024-09-27 NOTE — SEDATION DOCUMENTATION
Pt cardioverted 4th time by Dr. Conti at bedside with nurses present (nurses present Martha HUYNH RN and this nurse) at 300joules.

## 2024-09-27 NOTE — ASSESSMENT & PLAN NOTE
Wt Readings from Last 3 Encounters:   09/27/24 69.3 kg (152 lb 12.5 oz)   08/29/24 65.9 kg (145 lb 3.2 oz)   07/15/24 65 kg (143 lb 3.2 oz)       Echo 5/16/24: normal systolic function EF 55%, grade I diastolic dysfunction  CXR showed mild pulmonary vascular congestion  BNP elevated 854 on admission  S/p Lasix 20mg IV in ED

## 2024-09-27 NOTE — ASSESSMENT & PLAN NOTE
Secondary to Afib RVR   Lactic on admission 4.2 > 2.2 > 3.6 > 2.3  SIRS criteria: Tachycardia, tachypnea - secondary to afib   No source of infection suspected   Bcx drawn in ED, result pending, U/A negative

## 2024-09-27 NOTE — ASSESSMENT & PLAN NOTE
Recent diagnosed of paroxysmal rapid a fib on ZIO monitoring.  Started on Xarelto 20 mg daily and metoprolol succinate 25 mg daily on 7/16/2024. Referred to EP with outpatient consult pending.  Echo 5/2024 with preserved LVEF, mild LA enlargement, and no significant valvular abnormality.  Now presents in symptomatic rapid a fib with HR's 180's not responding to IV Lopressor, dig, or diltiazem. Cardioversion attempts x 4 unsuccessful.   Start amiodarone load with bolus of 150 mg followed by drip at 1 mg/hr x 6 hours then 0.5 mg/hr.  Wean diltiazem drip as able.  Continue metoprolol succinate 25 mg daily and titrate as needed.  Continue Xarelto 20 mg daily.  Check TSH.  Optimize electrolytes for K+ >4, Mag >2.

## 2024-09-27 NOTE — ASSESSMENT & PLAN NOTE
Admitted with acute stroke in May 2024.  Echo unremarkable at the time.  Found to have paroxysmal rapid a fib on ZIO monitor and started on metoprolol and Xarelto 7/16/2024

## 2024-09-27 NOTE — H&P
H&P - Critical Care/ICU   Name: Radha Dodson 74 y.o. female I MRN: [id number]  Unit/Bed#: [facility] I Date of Admission: 9/27/2024   Date of Service: 9/27/2024 I Hospital Day: 0       Assessment & Plan  Atrial fibrillation with rapid ventricular response (HCC)  Presented Afib RVR rate in 190s  Follows with [facility] cardiology and was recently seen in June 2024 after cardio-embolic stroke for ZIO patch, Afib appeared to be new at that time  In ED S/p Digoxin 250mcg x3, Metoprolol 5mg IV x 3, Diltiazem 15mg IV once, synchronized cardioversion x4 (100, 200, 200, 300J) in ED   Cardiology consulted, recommended admission on Cardizem gtt   Hemodynamically stable   Home regimen: xarelto 20mg daily, metoprolol 25mg daily      Plan   Continue cardizem gtt to achieve rate control   Continue xarelto 20mg daily   Hold metoprolol 25mg while on diltiazem gtt  Cardiology consult, appreciate recs  H/O ischemic right MCA stroke  Found to have an acute right MCA stroke 5/15/2024 likely secondary to cardio-embolic source  Received TNK and also underwent a mechanical thrombectomy while admitted at [facility]  No residual deficits  Continue statin therapy   GEGE (acute kidney injury) (HCC)  Likely secondary to Afib RVR  Baseline Cr ~ 1.0  Cr on admission 1.34  S/p Lasix 20mg IV in ED  Continue to trend BMP  Avoid nephrotoxic agents  Avoid hypotension  Transaminitis  Etiology unknown, denies ETOH consumption  AST 46, ALT 58 on admission  No intervention at this time  Lactic acidosis  Secondary to Afib RVR   Lactic on admission 4.2  SIRS criteria: Tachycardia, tachypnea - secondary to afib   No source of infection suspected   Bcx drawn in ED, U/A pending  Trend until cleared   Diastolic heart failure (HCC)  Wt Readings from Last 3 Encounters:   09/27/24 69.3 kg (152 lb 12.5 oz)   08/29/24 65.9 kg (145 lb 3.2 oz)   07/15/24 65 kg (143 lb 3.2 oz)       Echo 5/16/24: normal systolic function EF 55%, grade I diastolic dysfunction  CXR showed mild pulmonary  vascular congestion  BNP elevated 854 on admission  S/p Lasix 20mg IV in ED        Disposition: Stepdown Level 1    History of Present Illness   Radha Dodson is a 74 y.o. Hx of afib on xarelto and R MCA stroke in May 2024 who was admitted for Afib RVR requiring Cardizem gtt. She reports prior to going to bed last night she had chest pressure and shortness of breath which did not resolve and prompted her to come to the ED. She is compliant with her xarelto and metoprolol at home, last doses were yesterday morning. She reports having URI symptoms, dry cough and rhinorrhea last week. Upon arrival to the ED she was found to be in Afib RVR rates in 190s, hemodynamically stable. Attempted rate control with metoprolol 5mg IV x 3, Digoxin 250mcg IV x 3, and Cardizem 15mg IV once unsuccessfully. Synchronized electrical cardioversion was attempted 4 times (100, 200, 200, 300J) with a brief return to NSR after each, but then returned to Afib RVR. Cardiology was consulted in ED who recommended she be admitted on Cardizem gtt.       History obtained from chart review and the patient.  Review of Systems: Review of Systems   Constitutional:  Positive for fatigue.   Respiratory:  Positive for shortness of breath.    Cardiovascular:         Chest pressure   Gastrointestinal: Negative.    Neurological: Negative.    Psychiatric/Behavioral: Negative.         Historical Information   Past Medical History:  No date: High cholesterol  No date: Stroke (HCC) Past Surgical History:  5/15/2024: IR STROKE ALERT   Current Outpatient Medications   Medication Instructions    atorvastatin (LIPITOR) 40 mg, Oral, Daily with dinner    metoprolol succinate (TOPROL-XL) 25 mg, Oral, Daily    Multiple Vitamin (multivitamin) tablet 1 tablet, Oral, Daily    NON FORMULARY Daily, Taking a Calcium Supplement    rivaroxaban (XARELTO) 20 mg, Oral, Daily with breakfast    No Known Allergies   Social History     Tobacco Use    Smoking status: Never    Smokeless  tobacco: Never   Vaping Use    Vaping status: Never Used   Substance Use Topics    Alcohol use: Yes     Comment: Social    Drug use: Never    Family History   Problem Relation Age of Onset    Atrial fibrillation Mother     Lung cancer Father           Objective                          Vitals I/O      Most Recent Min/Max in 24hrs   Temp (!) 97.3 °F (36.3 °C) Temp  Min: 97.3 °F (36.3 °C)  Max: 97.3 °F (36.3 °C)   Pulse (!) 116 Pulse  Min: 77  Max: 171   Resp 18 Resp  Min: 18  Max: 30   /92 BP  Min: 89/70  Max: 150/63   O2 Sat 95 % SpO2  Min: 92 %  Max: 96 %      Intake/Output Summary (Last 24 hours) at 9/27/2024 0545  Last data filed at 9/27/2024 0349  Gross per 24 hour   Intake 250 ml   Output --   Net 250 ml       Diet Regular; Regular House    Invasive Monitoring           Physical Exam   Physical Exam  Eyes:      Pupils: Pupils are equal, round, and reactive to light.   Skin:     General: Skin is warm and dry.   HENT:      Head: Normocephalic and atraumatic.   Cardiovascular:      Rate and Rhythm: Tachycardia present. Rhythm irregular.      Pulses: Normal pulses.      Heart sounds: Normal heart sounds.   Musculoskeletal:         General: Normal range of motion.      Right lower leg: No edema.      Left lower leg: No edema.   Abdominal: General: Bowel sounds are normal. There is no distension.      Palpations: Abdomen is soft.      Tenderness: There is no abdominal tenderness.   Constitutional:       Appearance: She is well-developed and well-nourished.   Pulmonary:      Effort: Pulmonary effort is normal.      Breath sounds: Normal breath sounds.   Neurological:      General: No focal deficit present.      Mental Status: She is alert and oriented to person, place and time.      Motor: gross motor function is at baseline for patient. Strength full and intact in all extremities.          Diagnostic Studies        Lab Results: I have reviewed the following results: CBC/BMP:   .     09/27/24  0210   WBC 8.47  "  HGB 14.3   HCT 44.1      SODIUM 137   K 4.1      CO2 22   BUN 24   CREATININE 1.34*   GLUC 300*   MG 2.1    , Creatinine Clearance: Estimated Creatinine Clearance: 32.5 mL/min (A) (by C-G formula based on SCr of 1.34 mg/dL (H))., LFTs:   .     09/27/24 0210   AST 46*   ALT 58*   ALB 4.0   TBILI 0.94   ALKPHOS 91    , Troponin,BNP:  .     09/27/24 0210 09/27/24  0412   HSTNI0 39  --    HSTNI2  --  44   *  --     , Lactic Acid:   .     09/27/24 0210   LACTICACID 4.2*    , Amylase: No results found for: \"AMYLASE\"     Medications:  Scheduled PRN   chlorhexidine, 15 mL, Q12H CAMPOS          Continuous    diltiazem, 5 mg/hr, Last Rate: 7.5 mg/hr (09/27/24 0431)         Labs:   CBC    Recent Labs     09/27/24 0210   WBC 8.47   HGB 14.3   HCT 44.1        BMP    Recent Labs     09/27/24 0210   SODIUM 137   K 4.1      CO2 22   AGAP 12   BUN 24   CREATININE 1.34*   CALCIUM 8.8       Coags    Recent Labs     09/27/24 0210   INR 1.86*   PTT 30        Additional Electrolytes  Recent Labs     09/27/24 0210   MG 2.1          Blood Gas    No recent results  No recent results LFTs  Recent Labs     09/27/24 0210   ALT 58*   AST 46*   ALKPHOS 91   ALB 4.0   TBILI 0.94       Infectious  No recent results  Glucose  Recent Labs     09/27/24 0210   GLUC 300*          "

## 2024-09-27 NOTE — ASSESSMENT & PLAN NOTE
Secondary to Afib RVR   Lactic on admission 4.2  SIRS criteria: Tachycardia, tachypnea - secondary to afib   No source of infection suspected   Bcx drawn in ED, U/A pending  Trend until cleared

## 2024-09-27 NOTE — SEDATION DOCUMENTATION
Pt cardioverted 3rd time by Dr. Conti at bedside with nurses present (nurses present Martha HUYNH RN and this nurse) at 200joules.

## 2024-09-27 NOTE — ASSESSMENT & PLAN NOTE
Found to have an acute right MCA stroke 5/15/2024   Received TNK and also underwent a mechanical thrombectomy   No residual deficits  Continue statin therapy

## 2024-09-27 NOTE — ASSESSMENT & PLAN NOTE
Etiology unknown, denies ETOH consumption  AST 46, ALT 58 on admission  No intervention at this time  Recheck LFTs in AM

## 2024-09-27 NOTE — ASSESSMENT & PLAN NOTE
Wt Readings from Last 3 Encounters:   09/27/24 66.1 kg (145 lb 11.6 oz)   08/29/24 65.9 kg (145 lb 3.2 oz)   07/15/24 65 kg (143 lb 3.2 oz)       Echo 5/16/24: normal systolic function EF 55%, grade I diastolic dysfunction  Sudden onset of SOB and wheezing on presentation, 3L NC   CXR showed mild pulmonary vascular congestion  BNP elevated 854 on admission  S/p Lasix 20mg IV in ED  Continue to monitor volume status and reassess need for diuresis  Strict I&O  Daily weight

## 2024-09-27 NOTE — ED PROCEDURE NOTE
PROCEDURE  Cardioversion    Date/Time: 9/27/2024 4:49 AM    Performed by: Isrrael Conti MD  Authorized by: Isrrael Conti MD    Verbal consent obtained?: Yes    Consent given by:  Patient  Patient identity confirmed:  Verbally with patient  Patient sedated: Yes    Sedation type: moderate (conscious) sedation    Sedation:  Etomidate  Sedation start:  9/27/2024 4:25 AM  Sedation end:  9/27/2024 4:32 AM  Cardioversion basis:  Emergent  Pre-procedure rhythm:  Atrial fibrillation  Position: Patient was placed in a supine position    Chest area exposed: Chest area was exposed    Electrodes:  Pads  Electrodes placed:  Anterior-lateral  Number of attempts:  4  Attempt 1:     Attempt 1 mode:  Synchronous    Attempt 1 shock (Joules):  100    Cardioversion outcome attempt one: Brief conversion to normal sinus rhythm then back to atrial fibrillation.  Attempt 2:     Attempt 2 mode:  Synchronous    Attempt 2 shock (Joules):  200    Cardioversion outcome attempt two: Brief conversion to normal sinus rhythm and back to atrial fibrillation with RVR.  Attempt 3:     Attempt 3 mode:  Synchronous    Attempt 3 shock (Joules):  200    Cardioversion outcome attempt three: Brief conversion to normal sinus rhythm then back to A-fib with RVR.  Attempt 4:     Attempt 4 mode:  Synchronous    Attempt 4 shock (Joules):  300    Cardioversion outcome attempt four: Brief conversion to normal sinus rhythm and then back to A-fib with RVR.  Post-procedure rhythm:  Atrial fibrillation  Complications: no complications    Patient tolerance:  Patient tolerated the procedure well with no immediate complications       Isrrael Conti MD  09/27/24 0451

## 2024-09-27 NOTE — ED PROCEDURE NOTE
PROCEDURE  Pre-Procedural Sedation    Performed by: Isrrael Conti MD  Authorized by: Isrrael Conti MD    Consent:     Consent obtained:  Written    Consent given by:  Patient    Risks discussed:  Allergic reaction, dysrhythmia, inadequate sedation, nausea, vomiting, respiratory compromise necessitating ventilatory assistance and intubation, prolonged sedation necessitating reversal and prolonged hypoxia resulting in organ damage  Universal protocol:     Patient identity confirmation method:  Verbally with patient  Indications:     Sedation purpose:  Cardioversion    Procedure necessitating sedation performed by:  Physician performing sedation    Intended level of sedation:  Moderate (conscious sedation)  Pre-sedation assessment:     NPO status caution: urgency dictates proceeding with non-ideal NPO status      ASA classification: class 2 - patient with mild systemic disease      Neck mobility: normal      Mouth openin finger widths    Thyromental distance:  3 finger widths    Mallampati score:  II - soft palate, uvula, fauces visible    Pre-sedation assessments completed and reviewed: airway patency, cardiovascular function, hydration status, mental status, nausea/vomiting, pain level, respiratory function and temperature      Pre-sedation assessment completed:  2024 4:20 AM       Isrrael Conti MD  24 [id number]

## 2024-09-27 NOTE — ASSESSMENT & PLAN NOTE
In the setting of A Fib RVR  No source of infection  UA negative  Bcx pending  Lactic acidosis - trending down  No indication for abx

## 2024-09-27 NOTE — ASSESSMENT & PLAN NOTE
Likely secondary to Afib RVR  Baseline Cr ~ 1.0  Cr on admission 1.34  S/p Lasix 20mg IV in ED  Continue to trend BMP  Avoid nephrotoxic agents  Avoid hypotension  Resolved on 09/27/2024, Cr trend down to 1.26

## 2024-09-27 NOTE — ASSESSMENT & PLAN NOTE
Presented Afib RVR rate in 190s  S/p Digoxin 250mcg x3, Metoprolol 5mg IV x 3, Diltiazem 15mg IV once, synchronized cardioversion x4 (100, 200, 200, 300J) in ED   Cardiology consulted, recommended admission on Cardizem gtt   Hemodynamically stable   Home regimen: xarelto 20mg daily, metoprolol 25mg daily     Plan   Continue cardizem gtt  Continue xarelto 20mg daily   Hold metoprolol 25mg while on diltiazem gtt  Cardiology consult, appreciate recs

## 2024-09-27 NOTE — ASSESSMENT & PLAN NOTE
Etiology unknown, denies ETOH consumption  AST 46, ALT 58 on admission  No intervention at this time

## 2024-09-27 NOTE — ASSESSMENT & PLAN NOTE
Likely secondary to Afib RVR  Baseline Cr ~ 1.0  Cr on admission 1.34  S/p Lasix 20mg IV in ED  Continue to trend BMP  Avoid nephrotoxic agents  Avoid hypotension

## 2024-09-27 NOTE — ASSESSMENT & PLAN NOTE
Found to have an acute right MCA stroke 5/15/2024 likely secondary to cardio-embolic source  Received TNK and also underwent a mechanical thrombectomy while admitted at [facility]  No residual deficits  Continue statin therapy

## 2024-09-27 NOTE — ED NOTES
Etomidate 20mg wasted that was not utilized with DMITRI Naidu as witness.     Emilia Chin, DMITRI  09/27/24 0572

## 2024-09-27 NOTE — ED NOTES
This nurse verified cardizem drip rate with DMITRI Glez at bedside upon arrival to unit. Nurse took pt to unit with monitor on.     Emilia Chin RN  09/27/24 [id number]

## 2024-09-27 NOTE — PLAN OF CARE
Problem: PAIN - ADULT  Goal: Verbalizes/displays adequate comfort level or baseline comfort level  Description: Interventions:  - Encourage patient to monitor pain and request assistance  - Assess pain using appropriate pain scale  - Administer analgesics based on type and severity of pain and evaluate response  - Implement non-pharmacological measures as appropriate and evaluate response  - Consider cultural and social influences on pain and pain management  - Notify physician/advanced practitioner if interventions unsuccessful or patient reports new pain  Outcome: Progressing     Problem: INFECTION - ADULT  Goal: Absence or prevention of progression during hospitalization  Description: INTERVENTIONS:  - Assess and monitor for signs and symptoms of infection  - Monitor lab/diagnostic results  - Monitor all insertion sites, i.e. indwelling lines, tubes, and drains  - Monitor endotracheal if appropriate and nasal secretions for changes in amount and color  - [place] appropriate cooling/warming therapies per order  - Administer medications as ordered  - Instruct and encourage patient and family to use good hand hygiene technique  - Identify and instruct in appropriate isolation precautions for identified infection/condition  Outcome: Progressing  Goal: Absence of fever/infection during neutropenic period  Description: INTERVENTIONS:  - Monitor WBC    Outcome: Progressing     Problem: DISCHARGE PLANNING  Goal: Discharge to home or other facility with appropriate resources  Description: INTERVENTIONS:  - Identify barriers to discharge w/patient and caregiver  - Arrange for needed discharge resources and transportation as appropriate  - Identify discharge learning needs (meds, wound care, etc.)  - Arrange for interpretive services to assist at discharge as needed  - Refer to Case Management Department for coordinating discharge planning if the patient needs post-hospital services based on physician/advanced  practitioner order or complex needs related to functional status, cognitive ability, or social support system  Outcome: Progressing     Problem: Knowledge Deficit  Goal: Patient/family/caregiver demonstrates understanding of disease process, treatment plan, medications, and discharge instructions  Description: Complete learning assessment and assess knowledge base.  Interventions:  - Provide teaching at level of understanding  - Provide teaching via preferred learning methods  Outcome: Progressing     Problem: CARDIOVASCULAR - ADULT  Goal: Maintains optimal cardiac output and hemodynamic stability  Description: INTERVENTIONS:  - Monitor I/O, vital signs and rhythm  - Monitor for S/S and trends of decreased cardiac output  - Administer and titrate ordered vasoactive medications to optimize hemodynamic stability  - Assess quality of pulses, skin color and temperature  - Assess for signs of decreased coronary artery perfusion  - Instruct patient to report change in severity of symptoms  Outcome: Progressing  Goal: Absence of cardiac dysrhythmias or at baseline rhythm  Description: INTERVENTIONS:  - Continuous cardiac monitoring, vital signs, obtain 12 lead EKG if ordered  - Administer antiarrhythmic and heart rate control medications as ordered  - Monitor electrolytes and administer replacement therapy as ordered  Outcome: Progressing     Problem: RESPIRATORY - ADULT  Goal: Achieves optimal ventilation and oxygenation  Description: INTERVENTIONS:  - Assess for changes in respiratory status  - Assess for changes in mentation and behavior  - Position to facilitate oxygenation and minimize respiratory effort  - Oxygen administered by appropriate delivery if ordered  - Initiate smoking cessation education as indicated  - Encourage broncho-pulmonary hygiene including cough, deep breathe, Incentive Spirometry  - Assess the need for suctioning and aspirate as needed  - Assess and instruct to report SOB or any respiratory  difficulty  - Respiratory Therapy support as indicated  Outcome: Progressing     Problem: CARDIOVASCULAR - ADULT  Goal: Maintains optimal cardiac output and hemodynamic stability  Description: INTERVENTIONS:  - Monitor I/O, vital signs and rhythm  - Monitor for S/S and trends of decreased cardiac output  - Administer and titrate ordered vasoactive medications to optimize hemodynamic stability  - Assess quality of pulses, skin color and temperature  - Assess for signs of decreased coronary artery perfusion  - Instruct patient to report change in severity of symptoms  Outcome: Progressing  Goal: Absence of cardiac dysrhythmias or at baseline rhythm  Description: INTERVENTIONS:  - Continuous cardiac monitoring, vital signs, obtain 12 lead EKG if ordered  - Administer antiarrhythmic and heart rate control medications as ordered  - Monitor electrolytes and administer replacement therapy as ordered  Outcome: Progressing

## 2024-09-27 NOTE — ED PROVIDER NOTES
Final diagnoses:   Atrial fibrillation with RVR ([facility]) - With failed electrical cardioversion   CHF (congestive heart failure) ([facility])     ED Disposition       ED Disposition   Admit    Condition   Stable    Date/Time   Fri Sep 27, 2024  4:36 AM    Comment   Case was discussed with Dr. Luis and the patient's admission status was agreed to be Admission Status: inpatient status to the service of Dr. Luis .               Assessment & Plan       Medical Decision Making  Amount and/or Complexity of Data Reviewed  Labs: ordered. Decision-making details documented in ED Course.  Radiology: ordered and independent interpretation performed. Decision-making details documented in ED Course.  ECG/medicine tests: ordered and independent interpretation performed. Decision-making details documented in ED Course.  Discussion of management or test interpretation with external provider(s): Differential diagnosis includes but not limited to STEMI, NSTEMI, PE, pneumonia, pneumothorax, musculoskeletal chest pain, costochondritis, gastritis, cholelithiasis, contusion, strain    Risk  Prescription drug management.  Decision regarding hospitalization.        ED Course as of 09/27/24 0455   Fri Sep 27, 2024   0223 Chest x-ray consistent with CHF   0303 LACTIC ACID(!!): 4.2  Doubt infectious etiology.  Probably secondary to atrial fibrillation with RVR.   0414 Patient with minimal response with various medications.  Discussed with cardiology.  Recommend cardioversion.   0419 Patient again states she has been compliant with her Xarelto.   0434 Unsuccessful cardioversion x 4.  Again discussed with cardiology.  Recommends admit for IV diltiazem.       Medications   furosemide (LASIX) injection 20 mg (0 mg Intravenous Hold 9/27/24 0232)   diltiazem (CARDIZEM) 125 mg in sodium chloride 0.9 % 125 mL infusion (7.5 mg/hr Intravenous Rate/Dose Change 9/27/24 0400)   etomidate (AMIDATE) 2 mg/mL injection 20.8 mg (has no administration in time range)    metoprolol (LOPRESSOR) injection 5 mg (5 mg Intravenous Given 9/27/24 0215)   diltiazem (CARDIZEM) injection 15 mg (15 mg Intravenous Given 9/27/24 0215)   albuterol (FOR EMS ONLY) (2.5 mg/3 mL) 0.083 % inhalation solution 2.5 mg (0 mg Does not apply Given to EMS 9/27/24 0210)   methylPREDNISolone sodium succinate (FOR EMS ONLY) (Solu-MEDROL) 125 MG injection 125 mg (0 mg Does not apply Given to EMS 9/27/24 0210)   ipratropium-albuterol (FOR EMS ONLY) (DUO-NEB) 0.5-2.5 mg/3 mL inhalation solution 3 mL (0 mL Does not apply Given to EMS 9/27/24 0210)   metoprolol (LOPRESSOR) injection 5 mg (5 mg Intravenous Given 9/27/24 0253)   digoxin (LANOXIN) injection 250 mcg (250 mcg Intravenous Given 9/27/24 0235)   digoxin (LANOXIN) injection 250 mcg (250 mcg Intravenous Given 9/27/24 0306)   sodium chloride 0.9 % bolus 250 mL (0 mL Intravenous Stopped 9/27/24 0349)   metoprolol (LOPRESSOR) injection 5 mg (5 mg Intravenous Given 9/27/24 0440)   digoxin (LANOXIN) injection 250 mcg (250 mcg Intravenous Given 9/27/24 0448)       ED Risk Strat Scores                                               History of Present Illness       Chief Complaint   Patient presents with    Shortness of Breath     Pt had cold sx for several days. Pt had sudden onset SOB and wheezing tonight. Afib RVR and stroke 1 month ago. Got 1lbuterol and 1 douneb, 125mg Solumedrol. 25mg Cardizem       Past Medical History:   Diagnosis Date    High cholesterol     Stroke (HCC)       Past Surgical History:   Procedure Laterality Date    IR STROKE ALERT  5/15/2024      Family History   Problem Relation Age of Onset    Atrial fibrillation Mother     Lung cancer Father       Social History     Tobacco Use    Smoking status: Never    Smokeless tobacco: Never   Vaping Use    Vaping status: Never Used   Substance Use Topics    Alcohol use: Yes     Comment: Social    Drug use: Never      E-Cigarette/Vaping    E-Cigarette Use Never User       E-Cigarette/Vaping Substances     Nicotine No     THC No     CBD No     Flavoring No     Other No     Unknown No       I have reviewed and agree with the history as documented.     Patient has had cold symptoms for the past few days.  Complaining of increasing shortness of breath starting last night.  Was given a DuoNeb and 125 of Solu-Medrol IV via EMS.  No history of CHF.  No history of COPD.  Does not smoke.  Does have a history of atrial fibrillation.  Was noted to be in atrial fibrillation with an RVR in the high 100s.  Was given 25 mg of IV Cardizem with decrease in her heart rate.  No chest pain.  No increased leg swelling.  Compliant taking her medications including Xarelto.      History provided by:  Patient   used: No    Shortness of Breath  Severity:  Mild  Onset quality:  Gradual  Duration:  1 day  Timing:  Constant  Progression:  Worsening  Chronicity:  New  Context: URI    Context: not activity and not occupational exposure    Relieved by:  Nothing  Worsened by:  Nothing  Ineffective treatments:  None tried  Associated symptoms: cough and wheezing    Associated symptoms: no abdominal pain, no chest pain, no claudication, no ear pain, no fever, no headaches, no neck pain, no rash, no sore throat, no sputum production and no vomiting        Review of Systems   Constitutional:  Negative for chills and fever.   HENT:  Negative for ear pain, hearing loss, sore throat, trouble swallowing and voice change.    Eyes:  Negative for pain and discharge.   Respiratory:  Positive for cough, shortness of breath and wheezing. Negative for sputum production.    Cardiovascular:  Negative for chest pain, palpitations and claudication.   Gastrointestinal:  Negative for abdominal pain, blood in stool, constipation, diarrhea, nausea and vomiting.   Genitourinary:  Negative for dysuria, flank pain, frequency and hematuria.   Musculoskeletal:  Negative for joint swelling, neck pain and neck stiffness.   Skin:  Negative for rash and  wound.   Neurological:  Negative for dizziness, seizures, syncope, facial asymmetry and headaches.   Psychiatric/Behavioral:  Negative for hallucinations, self-injury and suicidal ideas.    All other systems reviewed and are negative.          Objective       ED Triage Vitals [09/27/24 0204]   Temperature Pulse Blood Pressure Respirations SpO2 Patient Position - Orthostatic VS   (!) 97.3 °F (36.3 °C) 77 133/84 19 93 % Lying      Temp Source Heart Rate Source BP Location FiO2 (%) Pain Score    Temporal Monitor Left arm -- --      Vitals      Date and Time Temp Pulse SpO2 Resp BP Pain Score FACES Pain Rating User   09/27/24 0435 -- 156 96 % 22 126/95 -- -- NM   09/27/24 0430 -- 148 92 % 20 129/91 -- -- NM   09/27/24 0425 -- 146 94 % 22 131/101 -- -- NM   09/27/24 0400 -- 140 96 % 22 112/83 per verbalized by Dr. Conti okay to up titrate diltiazem infusion per order titration in epic. -- -- NM   09/27/24 0349 -- 135 -- -- 113/79 -- -- NM   09/27/24 0345 -- 134 95 % 22 113/79 -- -- NM   09/27/24 0330 -- 141 95 % 24 89/70 -- -- NM   09/27/24 0320 -- 145 94 % 24 94/66 Dr. Conti made aware of pts BP. Per verbalized by Dr. Conti give pt 250ml bolus of fluids and recheck BP and to hold cardizem until after bolus. -- -- NM   09/27/24 0315 -- 146 94 % 26 102/61 Dr. Cnoti aware of BP and verbalized okay to start cardizem drip. -- -- NM   09/27/24 0300 -- 144 93 % 30 116/73 Dr. Conti aware of pts BP; per verbalized by Dr. Wagoner give digoxin as ordered and continue to hold lasix. Per verbalized by Dr. Conti start cardizem drip after digoxin administration. -- -- NM   09/27/24 0245 -- 146 93 % 30 109/67 Dr. Conti made aware of pts BP. Per verbalized by Dr. Conti okana to give pt lopressor 5mg as ordered. -- -- NM   09/27/24 0232 -- 144 95 % 26 99/58 made Dr. conti aware of pts BP. Per verbalized by Dr. conti hold lasix, diltiazem and metoprolol orders and give digoxin. -- -- NM   09/27/24 0222 -- 162  95 % 28 150/63 -- -- NM   09/27/24 0208 -- 171 -- -- -- -- -- RG   09/27/24 0204 97.3 °F (36.3 °C) 77 93 % 19 133/84 -- --             Physical Exam  Vitals and nursing note reviewed.   Constitutional:       General: She is not in acute distress.     Appearance: She is well-developed.   HENT:      Head: Normocephalic and atraumatic.      Right Ear: External ear normal.      Left Ear: External ear normal.   Eyes:      General: No scleral icterus.        Right eye: No discharge.         Left eye: No discharge.      Extraocular Movements: Extraocular movements intact.      Conjunctiva/sclera: Conjunctivae normal.   Cardiovascular:      Rate and Rhythm: Tachycardia present. Rhythm irregular.      Heart sounds: Normal heart sounds. No murmur heard.  Pulmonary:      Effort: Pulmonary effort is normal.      Breath sounds: Decreased breath sounds and wheezing present. No rales.      Comments: Crackles at bases  Abdominal:      General: Bowel sounds are normal. There is no distension.      Palpations: Abdomen is soft.      Tenderness: There is no abdominal tenderness. There is no guarding or rebound.   Musculoskeletal:         General: No deformity. Normal range of motion.      Cervical back: Normal range of motion and neck supple.   Skin:     General: Skin is warm and dry.      Findings: No rash.   Neurological:      General: No focal deficit present.      Mental Status: She is alert and oriented to person, place, and time.      Cranial Nerves: No cranial nerve deficit.   Psychiatric:         Mood and Affect: Mood normal.         Behavior: Behavior normal.         Thought Content: Thought content normal.         Judgment: Judgment normal.         Results Reviewed       Procedure Component Value Units Date/Time    HS Troponin I 2hr [[id number]]  (Normal) Collected: 09/27/24 0412    Lab Status: Final result Specimen: Blood from Arm, Left Updated: 09/27/24 0444     hs TnI 2hr 44 ng/L      Delta 2hr hsTnI 5 ng/L     Lactic  acid, plasma (w/reflex if result > 2.0) [[id number]]  (Abnormal) Collected: 09/27/24 0210    Lab Status: Final result Specimen: Blood from Arm, Right Updated: 09/27/24 0259     LACTIC ACID 4.2 mmol/L     Narrative:      Result may be elevated if tourniquet was used during collection.    Lactic acid 2 Hours [[id number]]     Lab Status: No result Specimen: Blood     FLU/COVID Rapid Antigen (30 min. TAT) - Preferred screening test in ED [[id number]]  (Normal) Collected: 09/27/24 0222    Lab Status: Final result Specimen: Nares from Nose Updated: 09/27/24 0255     SARS COV Rapid Antigen Negative     Influenza A Rapid Antigen Negative     Influenza B Rapid Antigen Negative    Narrative:      This test has been performed using the [employer] Maritza 2 FLU+SARS Antigen test under the Emergency Use Authorization (EUA). This test has been validated by the  and verified by the performing laboratory. The Maritza uses lateral flow immunofluorescent sandwich assay to detect SARS-COV, Influenza A and Influenza B Antigen.     The [employer] Maritza 2 SARS Antigen test does not differentiate between SARS-CoV and SARS-CoV-2.     Negative results are presumptive and may be confirmed with a molecular assay, if necessary, for patient management. Negative results do not rule out SARS-CoV-2 or influenza infection and should not be used as the sole basis for treatment or patient management decisions. A negative test result may occur if the level of antigen in a sample is below the limit of detection of this test.     Positive results are indicative of the presence of viral antigens, but do not rule out bacterial infection or co-infection with other viruses.     All test results should be used as an adjunct to clinical observations and other information available to the provider.    FOR PEDIATRIC PATIENTS - copy/paste COVID Guidelines URL to browser: [email or web address]    HS Troponin 0hr  (reflex protocol) [[id number]]  (Normal) Collected: 09/27/24 0210    Lab Status: Final result Specimen: Blood from Arm, Right Updated: 09/27/24 0254     hs TnI 0hr 39 ng/L     APTT [[id number]]  (Normal) Collected: 09/27/24 0210    Lab Status: Final result Specimen: Blood from Arm, Right Updated: 09/27/24 0251     PTT 30 seconds     Protime-INR [[id number]]  (Abnormal) Collected: 09/27/24 0210    Lab Status: Final result Specimen: Blood from Arm, Right Updated: 09/27/24 0251     Protime 21.7 seconds      INR 1.86    Narrative:      INR Therapeutic Range    Indication                                             INR Range      Atrial Fibrillation                                               2.0-3.0  Hypercoagulable State                                    2.0.2.3  Left Ventricular Asist Device                            2.0-3.0  Mechanical Heart Valve                                  -    Aortic(with afib, MI, embolism, HF, LA enlargement,    and/or coagulopathy)                                     2.0-3.0 (2.5-3.5)     Mitral                                                             2.5-3.5  Prosthetic/Bioprosthetic Heart Valve               2.0-3.0  Venous thromboembolism (VTE: VT, PE        2.0-3.0    B-Type Natriuretic Peptide(BNP) [[id number]]  (Abnormal) Collected: 09/27/24 0210    Lab Status: Final result Specimen: Blood from Arm, Right Updated: 09/27/24 0250      pg/mL     Comprehensive metabolic panel [[id number]]  (Abnormal) Collected: 09/27/24 0210    Lab Status: Final result Specimen: Blood from Arm, Right Updated: 09/27/24 0242     Sodium 137 mmol/L      Potassium 4.1 mmol/L      Chloride 103 mmol/L      CO2 22 mmol/L      ANION GAP 12 mmol/L      BUN 24 mg/dL      Creatinine 1.34 mg/dL      Glucose 300 mg/dL      Calcium 8.8 mg/dL      AST 46 U/L      ALT 58 U/L      Alkaline Phosphatase 91 U/L      Total Protein 6.9 g/dL      Albumin 4.0 g/dL      Total Bilirubin 0.94 mg/dL      eGFR 39  ml/min/1.73sq m     Narrative:      National Kid[employer] Disease Foundation guidelines for Chronic Kidney Disease (CKD):     Stage 1 with normal or high GFR (GFR > 90 mL/min/1.73 square meters)    Stage 2 Mild CKD (GFR = 60-89 mL/min/1.73 square meters)    Stage 3A Moderate CKD (GFR = 45-59 mL/min/1.73 square meters)    Stage 3B Moderate CKD (GFR = 30-44 mL/min/1.73 square meters)    Stage 4 Severe CKD (GFR = 15-29 mL/min/1.73 square meters)    Stage 5 End Stage CKD (GFR <15 mL/min/1.73 square meters)  Note: GFR calculation is accurate only with a steady state creatinine    Magnesium [[id number]]  (Normal) Collected: 09/27/24 0210    Lab Status: Final result Specimen: Blood from Arm, Right Updated: 09/27/24 0242     Magnesium 2.1 mg/dL     Lipase [[id number]]  (Normal) Collected: 09/27/24 0210    Lab Status: Final result Specimen: Blood from Arm, Right Updated: 09/27/24 0242     Lipase 31 u/L     Blood culture #2 [[id number]] Collected: 09/27/24 0229    Lab Status: In process Specimen: Blood from Hand, Left Updated: 09/27/24 0232    CBC and differential [[id number]] Collected: 09/27/24 0210    Lab Status: Final result Specimen: Blood from Arm, Right Updated: 09/27/24 0220     WBC 8.47 Thousand/uL      RBC 4.77 Million/uL      Hemoglobin 14.3 g/dL      Hematocrit 44.1 %      MCV 93 fL      MCH 30.0 pg      MCHC 32.4 g/dL      RDW 13.5 %      MPV 10.7 fL      Platelets 344 Thousands/uL      nRBC 0 /100 WBCs      Segmented % 46 %      Immature Grans % 0 %      Lymphocytes % 40 %      Monocytes % 11 %      Eosinophils Relative 2 %      Basophils Relative 1 %      Absolute Neutrophils 3.91 Thousands/µL      Absolute Immature Grans 0.02 Thousand/uL      Absolute Lymphocytes 3.42 Thousands/µL      Absolute Monocytes 0.90 Thousand/µL      Eosinophils Absolute 0.16 Thousand/µL      Basophils Absolute 0.06 Thousands/µL     Blood culture #1 [[id number]] Collected: 09/27/24 0210    Lab Status: In process Specimen: Blood from Arm, Right  Updated: 09/27/24 0217    UA w Reflex to Microscopic w Reflex to Culture [[id number]]     Lab Status: No result Specimen: Urine             XR chest 1 view portable    (Results Pending)       ECG 12 Lead Documentation Only    Date/Time: 9/27/2024 2:08 AM    Performed by: Isrrael Conti MD  Authorized by: Isrrael Conti MD    ECG reviewed by me, the ED Provider: yes    Patient location:  ED  Rate:     ECG rate:  170  Rhythm:     Rhythm: atrial fibrillation    CriticalCare Time    Date/Time: 9/27/2024 2:24 AM    Performed by: Isrrael Conti MD  Authorized by: Isrrael Conti MD    Critical care provider statement:     Critical care time (minutes):  35    Critical care time was exclusive of:  Separately billable procedures and treating other patients and teaching time    Critical care was necessary to treat or prevent imminent or life-threatening deterioration of the following conditions:  Circulatory failure    Critical care was time spent personally by me on the following activities:  Evaluation of patient's response to treatment, re-evaluation of patient's condition, ordering and review of radiographic studies, ordering and review of laboratory studies and ordering and performing treatments and interventions      ED Medication and Procedure Management   Prior to Admission Medications   Prescriptions Last Dose Informant Patient Reported? Taking?   Multiple Vitamin (multivitamin) tablet  Self Yes No   Sig: Take 1 tablet by mouth daily   NON FORMULARY  Self Yes No   Sig: in the morning Taking a Calcium Supplement   atorvastatin (LIPITOR) 40 mg tablet  Self No No   Sig: Take 1 tablet (40 mg total) by mouth daily with dinner   metoprolol succinate (TOPROL-XL) 25 mg 24 hr tablet   No No   Sig: Take 1 tablet (25 mg total) by mouth daily   rivaroxaban (Xarelto) 20 mg tablet   No No   Sig: Take 1 tablet (20 mg total) by mouth daily with breakfast      Facility-Administered Medications: None     Patient's  Medications   Discharge Prescriptions    No medications on file     No discharge procedures on file.  ED SEPSIS DOCUMENTATION   Time reflects when diagnosis was documented in both MDM as applicable and the Disposition within this note       Time User Action Codes Description Comment    9/27/2024  2:12 AM Isrrael Conti Add [I48.91] Atrial fibrillation with RVR ([facility])     9/27/2024  2:23 AM Isrrael Conti Add [I50.9] CHF (congestive heart failure) ([facility])     9/27/2024  4:55 AM Isrrael Conti Modify [I48.91] Atrial fibrillation with RVR ([facility]) With failed electrical cardioversion                 Isrrael Conti MD  09/27/24 0452       Isrrael Conti MD  09/27/24 0455       Isrrael Conti MD  09/27/24 050

## 2024-09-27 NOTE — ED NOTES
Per verbalized by Dr. Conti if pts SBP >100 after 250ml bolus of fluids then okay to start pt on cardizem drip.     Emilia Chin, DMITRI  09/27/24 4609

## 2024-09-27 NOTE — ED NOTES
Nurse took pt to floor with monitor. Upon arrival to floor coverified pts loren garcia with DMITRI Glez.      Emilia Chin, DMITRI  09/27/24 0677

## 2024-09-27 NOTE — ED PROCEDURE NOTE
PROCEDURE  Procedural Sedation    Date/Time: 9/27/2024 4:25 AM    Performed by: Isrrael Conti MD  Authorized by: Isrrael Conti MD    Immediate pre-procedure details:     Reassessment: Patient reassessed immediately prior to procedure      Reviewed: vital signs      Verified: bag valve mask available, emergency equipment available, intubation equipment available, IV patency confirmed, oxygen available, reversal medications available and suction available    Procedure details (see MAR for exact dosages):     Sedation start time:  9/27/2024 4:25 AM    Preoxygenation:  Nasal cannula    Sedation:  Etomidate    Intra-procedure monitoring:  Blood pressure monitoring, cardiac monitor, continuous capnometry, continuous pulse oximetry, frequent vital sign checks and frequent LOC assessments    Intra-procedure events: none      Sedation end time:  9/27/2024 4:32 AM    Total sedation time (minutes):  7  Post-procedure details:     Post-sedation assessment completed:  9/27/2024 4:52 AM    Attendance: Constant attendance by certified staff until patient recovered      Recovery: Patient returned to pre-procedure baseline      Post-sedation assessments completed and reviewed: airway patency, cardiovascular function, hydration status, mental status, nausea/vomiting, pain level, respiratory function and temperature      Patient is stable for discharge or admission: no      Patient tolerance:  Tolerated well, no immediate complications  Comments:      Attempted synchronized cardioversion x 4.  Used 100 J then 200 J x 2 then 300 J.  Brief return to normal sinus rhythm then back to A-fib with RVR.       Isrrael Conti MD  09/27/24 0452

## 2024-09-27 NOTE — ASSESSMENT & PLAN NOTE
Found to have an acute right MCA stroke 5/15/2024 likely secondary to cardio-embolic source  Received TNK and also underwent a mechanical thrombectomy while admitted at [facility]  No residual deficits  Continue statin therapy

## 2024-09-27 NOTE — ASSESSMENT & PLAN NOTE
Wt Readings from Last 3 Encounters:   09/27/24 66.1 kg (145 lb 11.6 oz)   08/29/24 65.9 kg (145 lb 3.2 oz)   07/15/24 65 kg (143 lb 3.2 oz)     Secondary to rapid rates. See discussion under a fib.  Echo 5/2024 with preserved LVEF   Responding well to IV furosemide 20 mg x 1 dose. Will monitor volume status.  Strict I's/O's.  Optimize electrolytes.

## 2024-09-27 NOTE — CASE MANAGEMENT
Case Management Assessment & Discharge Planning Note    Patient name Radha Dodson  Location /-01 MRN [id number]  : 1950 Date 2024       Current Admission Date: 2024  Current Admission Diagnosis:Atrial fibrillation with rapid ventricular response (HCC)   Patient Active Problem List    Diagnosis Date Noted Date Diagnosed    Atrial fibrillation with rapid ventricular response (HCC) 2024     GEGE (acute kidney injury) (HCC) 2024     Transaminitis 2024     Lactic acidosis 2024     Diastolic heart failure (HCC) 2024     Paroxysmal atrial fibrillation (HCC) 2024     Microhematuria 2024     Female genital prolapse 07/10/2024     Palpitations 2024     Hypertriglyceridemia 2024     Prediabetes 2024     Hydroureteronephrosis 2024     H/O ischemic right MCA stroke 2024       LOS (days): 0  Geometric Mean LOS (GMLOS) (days): 3.1  Days to GMLOS:2.9     OBJECTIVE:    Risk of Unplanned Readmission Score: 13.98         Current admission status: Inpatient  Referral Reason:  (dc planning)    Preferred Pharmacy:   [facility]pharm[facility] #[id number] [place], [place] - [address] [place] [id number]  Phone: [phone] Fax: [phone]    [facility] - [place] - [address]  [address]  [place] [address]  Phone: [phone] Fax: [phone]    Primary Care Provider: Ketan Mahmood MD    Primary Insurance: [employer] RE[employer]  Secondary Insurance:     CM met with patient at the bedside,baseline information  was obtained. CM discussed the role of CM in helping the patient develop a discharge plan and assist the patient in carry out their plan.        ASSESSMENT:  Active Health Care Proxies    There are no active Health Care Proxies on file.       Advance Directives  Does patient have a Health Care POA?: No  Was patient offered paperwork?: Yes (provided  paperwork)  Does patient currently have a Health Care decision maker?: Yes, please see Health Care Proxy section  Does patient have Advance Directives?: No  Was patient offered paperwork?: Yes (provided paperwork)  Primary Contact: Donald Dodson spouse          Readmission Root Cause  30 Day Readmission: No    Patient Information  Admitted from:: Home  Mental Status: Alert  During Assessment patient was accompanied by: Not accompanied during assessment  Assessment information provided by:: Patient  Support Systems: Spouse/significant other, Family members  County of Residence: [facility]  What city do you live in?: [place]  Home entry access options. Select all that apply.: Stairs  Number of steps to enter home.: 3  Do the steps have railings?: Yes  Type of Current Residence: [facility]  Living Arrangements: Lives Alone  Is patient a ?: No    Activities of Daily Living Prior to Admission  Functional Status: Independent  Completes ADLs independently?: Yes  Ambulates independently?: Yes  Does patient use assisted devices?: No  Does patient currently own DME?: No  Does patient have a history of Outpatient Therapy (PT/OT)?: No  Does the patient have a history of Short-Term Rehab?: No  Does patient have a history of HHC?: No  Does patient currently have HHC?: No         Patient Information Continued  Income Source: Pension/correction  Does patient have prescription coverage?: Yes  Does patient receive dialysis treatments?: No  Does patient have a history of substance abuse?: No  Does patient have a history of Mental Health Diagnosis?: No         Means of Transportation  Means of Transport to Appts:: Drives Self      Social Determinants of Health (SDOH)      Flowsheet Row Most Recent Value   Housing Stability    In the last 12 months, was there a time when you were not able to pay the mortgage or rent on time? N   In the past 12 months, how many times have you moved where you were living? 0   At any time  in the past 12 months, were you homeless or living in a shelter (including now)? N   Transportation Needs    In the past 12 months, has lack of transportation kept you from medical appointments or from getting medications? no   In the past 12 months, has lack of transportation kept you from meetings, work, or from getting things needed for daily living? No   Food Insecurity    Within the past 12 months, you worried that your food would run out before you got the money to buy more. Never true   Within the past 12 months, the food you bought just didn't last and you didn't have money to get more. Never true   Utilities    In the past 12 months has the electric, gas, oil, or water company threatened to shut off services in your home? No          I/PTA, resides with spouse.  [facility].    DISCHARGE DETAILS:    Discharge planning discussed with:: patient  Freedom of Choice: Yes     CM contacted family/caregiver?: No- see comments (pt alert and oriented, spouse is at work)       Treatment Team Recommendation: Home  Discharge Destination Plan:: Home  Transport at Discharge : Family         No needs are identified at the time of the initial assessment,  care manager will respond upon request or reassess patient for discharge needs as appropriate.

## 2024-09-27 NOTE — PLAN OF CARE
Problem: PAIN - ADULT  Goal: Verbalizes/displays adequate comfort level or baseline comfort level  Description: Interventions:  - Encourage patient to monitor pain and request assistance  - Assess pain using appropriate pain scale  - Administer analgesics based on type and severity of pain and evaluate response  - Implement non-pharmacological measures as appropriate and evaluate response  - Consider cultural and social influences on pain and pain management  - Notify physician/advanced practitioner if interventions unsuccessful or patient reports new pain  Outcome: Progressing     Problem: INFECTION - ADULT  Goal: Absence or prevention of progression during hospitalization  Description: INTERVENTIONS:  - Assess and monitor for signs and symptoms of infection  - Monitor lab/diagnostic results  - Monitor all insertion sites, i.e. indwelling lines, tubes, and drains  - Monitor endotracheal if appropriate and nasal secretions for changes in amount and color  - Fraser appropriate cooling/warming therapies per order  - Administer medications as ordered  - Instruct and encourage patient and family to use good hand hygiene technique  - Identify and instruct in appropriate isolation precautions for identified infection/condition  Outcome: Progressing  Goal: Absence of fever/infection during neutropenic period  Description: INTERVENTIONS:  - Monitor WBC    Outcome: Progressing     Problem: DISCHARGE PLANNING  Goal: Discharge to home or other facility with appropriate resources  Description: INTERVENTIONS:  - Identify barriers to discharge w/patient and caregiver  - Arrange for needed discharge resources and transportation as appropriate  - Identify discharge learning needs (meds, wound care, etc.)  - Arrange for interpretive services to assist at discharge as needed  - Refer to Case Management Department for coordinating discharge planning if the patient needs post-hospital services based on physician/advanced  practitioner order or complex needs related to functional status, cognitive ability, or social support system  Outcome: Progressing     Problem: Knowledge Deficit  Goal: Patient/family/caregiver demonstrates understanding of disease process, treatment plan, medications, and discharge instructions  Description: Complete learning assessment and assess knowledge base.  Interventions:  - Provide teaching at level of understanding  - Provide teaching via preferred learning methods  Outcome: Progressing     Problem: CARDIOVASCULAR - ADULT  Goal: Maintains optimal cardiac output and hemodynamic stability  Description: INTERVENTIONS:  - Monitor I/O, vital signs and rhythm  - Monitor for S/S and trends of decreased cardiac output  - Administer and titrate ordered vasoactive medications to optimize hemodynamic stability  - Assess quality of pulses, skin color and temperature  - Assess for signs of decreased coronary artery perfusion  - Instruct patient to report change in severity of symptoms  Outcome: Progressing  Goal: Absence of cardiac dysrhythmias or at baseline rhythm  Description: INTERVENTIONS:  - Continuous cardiac monitoring, vital signs, obtain 12 lead EKG if ordered  - Administer antiarrhythmic and heart rate control medications as ordered  - Monitor electrolytes and administer replacement therapy as ordered  Outcome: Progressing     Problem: RESPIRATORY - ADULT  Goal: Achieves optimal ventilation and oxygenation  Description: INTERVENTIONS:  - Assess for changes in respiratory status  - Assess for changes in mentation and behavior  - Position to facilitate oxygenation and minimize respiratory effort  - Oxygen administered by appropriate delivery if ordered  - Initiate smoking cessation education as indicated  - Encourage broncho-pulmonary hygiene including cough, deep breathe, Incentive Spirometry  - Assess the need for suctioning and aspirate as needed  - Assess and instruct to report SOB or any respiratory  difficulty  - Respiratory Therapy support as indicated  Outcome: Progressing

## 2024-09-27 NOTE — SEDATION DOCUMENTATION
Pt cardioverted 2nd time by Dr. Conti at bedside with nurses present (nurses present Martha HUYNH RN and this nurse) at 200joules.

## 2024-09-27 NOTE — SEDATION DOCUMENTATION
Per verbalized by Dr. Conti restart cardizem drip at 7.5mg/hr and keep at that rate. Drip restarted and co verified with Elysia RIZVI.

## 2024-09-27 NOTE — ASSESSMENT & PLAN NOTE
Presented Afib RVR rate in 190s  Follows with [facility] cardiology and was recently seen in June 2024 after cardio-embolic stroke for ZIO patch, Afib appeared to be new at that time  In ED S/p Digoxin 250mcg x3, Metoprolol 5mg IV x 3, Diltiazem 15mg IV once, synchronized cardioversion x4 (100, 200, 200, 300J) in ED   Cardiology consulted, recommended admission on Cardizem gtt   Hemodynamically stable   Home regimen: xarelto 20mg daily, metoprolol 25mg daily      Plan   Continue cardizem gtt to achieve rate control   Continue xarelto 20mg daily   Hold metoprolol 25mg while on diltiazem gtt  Cardiology consult, appreciate recs

## 2024-09-27 NOTE — SEDATION DOCUMENTATION
Pt cardioverted by Dr. Conti at bedside with nurses present (nurses present Martha HUYNH RN and this nurse) at 100joules.

## 2024-09-27 NOTE — ASSESSMENT & PLAN NOTE
Presented Afib RVR rate in 190s  Follows with [facility] cardiology and was recently seen in June 2024 after cardio-embolic stroke for ZIO patch, Afib appeared to be new at that time  In ED S/p Digoxin 250mcg x3, Metoprolol 5mg IV x 3, Diltiazem 15mg IV once, synchronized cardioversion x4 (100, 200, 200, 300J) in ED   Cardiology consulted, recommended admission on Cardizem gtt   Hemodynamically stable   Home regimen: xarelto 20mg daily, metoprolol succinate 25 mg daily   Diltiazem gtt d/c'd 9/27. Started on amiodarone IV with 150 mg bolus x2 and infusion (1 mg/hr for 6 hours, then 0.5 mg/hr for 18 hours).      Plan   Transition to PO amiodarone 400 TID on 09/28 and consider cardioversion if patient has persistent uncontrolled HR.   IV metoprolol 5 mg Q 4 hours  Continue home med metoprolol succinate 25 mg  Continue xarelto 20mg daily   Cardiology consult, appreciate recs

## 2024-09-28 PROBLEM — E87.20 LACTIC ACIDOSIS: Status: RESOLVED | Noted: 2024-09-27 | Resolved: 2024-09-28

## 2024-09-28 LAB
ALBUMIN SERPL BCG-MCNC: 3.6 G/DL (ref 3.5–5)
ALP SERPL-CCNC: 73 U/L (ref 34–104)
ALT SERPL W P-5'-P-CCNC: 44 U/L (ref 7–52)
ANION GAP SERPL CALCULATED.3IONS-SCNC: 6 MMOL/L (ref 4–13)
AST SERPL W P-5'-P-CCNC: 33 U/L (ref 13–39)
ATRIAL RATE: 51 BPM
ATRIAL RATE: 64 BPM
BASOPHILS # BLD AUTO: 0.01 THOUSANDS/ΜL (ref 0–0.1)
BASOPHILS NFR BLD AUTO: 0 % (ref 0–1)
BILIRUB SERPL-MCNC: 1.17 MG/DL (ref 0.2–1)
BUN SERPL-MCNC: 26 MG/DL (ref 5–25)
CA-I BLD-SCNC: 1.14 MMOL/L (ref 1.12–1.32)
CALCIUM SERPL-MCNC: 9.3 MG/DL (ref 8.4–10.2)
CHLORIDE SERPL-SCNC: 106 MMOL/L (ref 96–108)
CO2 SERPL-SCNC: 27 MMOL/L (ref 21–32)
CREAT SERPL-MCNC: 1.11 MG/DL (ref 0.6–1.3)
EOSINOPHIL # BLD AUTO: 0 THOUSAND/ΜL (ref 0–0.61)
EOSINOPHIL NFR BLD AUTO: 0 % (ref 0–6)
ERYTHROCYTE [DISTWIDTH] IN BLOOD BY AUTOMATED COUNT: 13.5 % (ref 11.6–15.1)
GFR SERPL CREATININE-BSD FRML MDRD: 49 ML/MIN/1.73SQ M
GLUCOSE SERPL-MCNC: 109 MG/DL (ref 65–140)
GLUCOSE SERPL-MCNC: 115 MG/DL (ref 65–140)
GLUCOSE SERPL-MCNC: 117 MG/DL (ref 65–140)
GLUCOSE SERPL-MCNC: 123 MG/DL (ref 65–140)
GLUCOSE SERPL-MCNC: 126 MG/DL (ref 65–140)
HCT VFR BLD AUTO: 41.7 % (ref 34.8–46.1)
HGB BLD-MCNC: 13.7 G/DL (ref 11.5–15.4)
IMM GRANULOCYTES # BLD AUTO: 0.07 THOUSAND/UL (ref 0–0.2)
IMM GRANULOCYTES NFR BLD AUTO: 0 % (ref 0–2)
LACTATE SERPL-SCNC: 0.8 MMOL/L (ref 0.5–2)
LYMPHOCYTES # BLD AUTO: 0.76 THOUSANDS/ΜL (ref 0.6–4.47)
LYMPHOCYTES NFR BLD AUTO: 5 % (ref 14–44)
MAGNESIUM SERPL-MCNC: 2 MG/DL (ref 1.9–2.7)
MCH RBC QN AUTO: 29.9 PG (ref 26.8–34.3)
MCHC RBC AUTO-ENTMCNC: 32.9 G/DL (ref 31.4–37.4)
MCV RBC AUTO: 91 FL (ref 82–98)
MONOCYTES # BLD AUTO: 1.12 THOUSAND/ΜL (ref 0.17–1.22)
MONOCYTES NFR BLD AUTO: 7 % (ref 4–12)
NEUTROPHILS # BLD AUTO: 14.05 THOUSANDS/ΜL (ref 1.85–7.62)
NEUTS SEG NFR BLD AUTO: 88 % (ref 43–75)
NRBC BLD AUTO-RTO: 0 /100 WBCS
P AXIS: 60 DEGREES
P AXIS: 72 DEGREES
PHOSPHATE SERPL-MCNC: 4 MG/DL (ref 2.3–4.1)
PLATELET # BLD AUTO: 337 THOUSANDS/UL (ref 149–390)
PMV BLD AUTO: 10.6 FL (ref 8.9–12.7)
POTASSIUM SERPL-SCNC: 4.7 MMOL/L (ref 3.5–5.3)
PR INTERVAL: 116 MS
PR INTERVAL: 118 MS
PROT SERPL-MCNC: 6.6 G/DL (ref 6.4–8.4)
QRS AXIS: -15 DEGREES
QRS AXIS: -18 DEGREES
QRSD INTERVAL: 80 MS
QRSD INTERVAL: 82 MS
QT INTERVAL: 480 MS
QT INTERVAL: 550 MS
QTC INTERVAL: 495 MS
QTC INTERVAL: 506 MS
RBC # BLD AUTO: 4.58 MILLION/UL (ref 3.81–5.12)
SODIUM SERPL-SCNC: 139 MMOL/L (ref 135–147)
T WAVE AXIS: 233 DEGREES
T WAVE AXIS: 236 DEGREES
VENTRICULAR RATE: 51 BPM
VENTRICULAR RATE: 64 BPM
WBC # BLD AUTO: 16.01 THOUSAND/UL (ref 4.31–10.16)

## 2024-09-28 PROCEDURE — 82330 ASSAY OF CALCIUM: CPT

## 2024-09-28 PROCEDURE — 82948 REAGENT STRIP/BLOOD GLUCOSE: CPT

## 2024-09-28 PROCEDURE — 83605 ASSAY OF LACTIC ACID: CPT | Performed by: PHYSICIAN ASSISTANT

## 2024-09-28 PROCEDURE — 94760 N-INVAS EAR/PLS OXIMETRY 1: CPT

## 2024-09-28 PROCEDURE — 85025 COMPLETE CBC W/AUTO DIFF WBC: CPT

## 2024-09-28 PROCEDURE — 5A2204Z RESTORATION OF CARDIAC RHYTHM, SINGLE: ICD-10-PCS | Performed by: STUDENT IN AN ORGANIZED HEALTH CARE EDUCATION/TRAINING PROGRAM

## 2024-09-28 PROCEDURE — 93005 ELECTROCARDIOGRAM TRACING: CPT

## 2024-09-28 PROCEDURE — 99233 SBSQ HOSP IP/OBS HIGH 50: CPT | Performed by: STUDENT IN AN ORGANIZED HEALTH CARE EDUCATION/TRAINING PROGRAM

## 2024-09-28 PROCEDURE — 92960 CARDIOVERSION ELECTRIC EXT: CPT | Performed by: NURSE PRACTITIONER

## 2024-09-28 PROCEDURE — 84100 ASSAY OF PHOSPHORUS: CPT

## 2024-09-28 PROCEDURE — 83735 ASSAY OF MAGNESIUM: CPT

## 2024-09-28 PROCEDURE — 80053 COMPREHEN METABOLIC PANEL: CPT

## 2024-09-28 RX ORDER — AMIODARONE HYDROCHLORIDE 200 MG/1
200 TABLET ORAL
Status: DISCONTINUED | OUTPATIENT
Start: 2024-09-28 | End: 2024-09-29

## 2024-09-28 RX ORDER — PROPOFOL 10 MG/ML
200 INJECTION, EMULSION INTRAVENOUS ONCE
Status: COMPLETED | OUTPATIENT
Start: 2024-09-28 | End: 2024-09-28

## 2024-09-28 RX ADMIN — CALCIUM 1 TABLET: 500 TABLET ORAL at 08:24

## 2024-09-28 RX ADMIN — AMIODARONE HYDROCHLORIDE 200 MG: 200 TABLET ORAL at 16:59

## 2024-09-28 RX ADMIN — Medication 1 TABLET: at 08:24

## 2024-09-28 RX ADMIN — CHLORHEXIDINE GLUCONATE 15 ML: 1.2 RINSE ORAL at 20:28

## 2024-09-28 RX ADMIN — PROPOFOL 130 MG: 10 INJECTION, EMULSION INTRAVENOUS at 10:38

## 2024-09-28 RX ADMIN — METOPROLOL SUCCINATE 25 MG: 25 TABLET, EXTENDED RELEASE ORAL at 08:24

## 2024-09-28 RX ADMIN — CHLORHEXIDINE GLUCONATE 15 ML: 1.2 RINSE ORAL at 08:24

## 2024-09-28 RX ADMIN — RIVAROXABAN 15 MG: 15 TABLET, FILM COATED ORAL at 08:24

## 2024-09-28 RX ADMIN — METOPROLOL TARTRATE 5 MG: 5 INJECTION INTRAVENOUS at 00:07

## 2024-09-28 RX ADMIN — AMIODARONE HYDROCHLORIDE 0.5 MG/MIN: 50 INJECTION, SOLUTION INTRAVENOUS at 05:53

## 2024-09-28 RX ADMIN — ATORVASTATIN CALCIUM 40 MG: 40 TABLET, FILM COATED ORAL at 16:59

## 2024-09-28 RX ADMIN — AMIODARONE HYDROCHLORIDE 200 MG: 200 TABLET ORAL at 12:02

## 2024-09-28 NOTE — PROCEDURES
Electrical Cardioversion    Date/Time: 9/28/2024 10:27 AM    Performed by: ERAN Menchaca  Authorized by: ERAN Menchaca    Verbal consent obtained?: Yes    Written consent obtained?: Yes    Risks and benefits: Risks, benefits and alternatives were discussed    Consent given by:  Patient  Patient states understanding of procedure being performed: Yes    Patient's understanding of procedure matches consent: Yes    Procedure consent matches procedure scheduled: Yes    Relevant documents present and verified: Yes    Test results available and properly labeled: Yes    Site marked: Yes    Radiology Images displayed and confirmed. If images not available, report reviewed: Yes    Required items: Required blood products, implants, devices and special equipment available    Patient identity confirmed:  Verbally with patient and arm band  Time out: Immediately prior to the procedure a time out was called    Patient sedated: Yes    Sedation:  Propofol (200 mg ordered, only 130 mg given)  Sedation start:  9/28/2024 10:24 AM  Sedation end:  9/28/2024 10:27 AM  Vital signs: Vital signs monitored during sedation    Cardioversion basis:  Elective  Elective indications: failure of anti-arrhythmic medications    Pre-procedure rhythm:  Atrial fibrillation  Position: Patient was placed in a supine position    Chest area exposed: Chest area was exposed    Electrodes:  Pads  Electrodes placed:  Anterior-lateral  Number of attempts:  1  Attempt 1:     Attempt 1 mode:  Synchronous    Attempt 1 waveform:  Biphasic    Attempt 1 shock (Joules):  200    Attempt 1 outcome:  Conversion to normal sinus rhythm  Post-procedure rhythm:  Normal sinus rhythm  Complications: no complications    Patient tolerance:  Patient tolerated the procedure well with no immediate complications

## 2024-09-28 NOTE — PROGRESS NOTES
Progress Note - Critical Care/ICU   Name: Radha Dodson 74 y.o. female I MRN: [id number]  Unit/Bed#: -01 I Date of Admission: 9/27/2024   Date of Service: 9/28/2024 I Hospital Day: 1      Assessment & Plan  Atrial fibrillation with rapid ventricular response (HCC)  Presented Afib RVR rate in 190s  Follows with [facility] cardiology and was recently seen in June 2024 after cardio-embolic stroke for ZIO patch, Afib appeared to be new at that time  In ED S/p Digoxin 250mcg x3, Metoprolol 5mg IV x 3, Diltiazem 15mg IV once, synchronized cardioversion x4 (100, 200, 200, 300J) in ED   Cardiology consulted, recommended admission on Cardizem gtt   Hemodynamically stable   Home regimen: xarelto 20mg daily, metoprolol succinate 25 mg daily   Diltiazem gtt d/c'd 9/27. Started on amiodarone IV with 150 mg bolus x2 and infusion (1 mg/hr for 6 hours, then 0.5 mg/hr for 18 hours).      Plan   Transition to PO amiodarone 400 TID on 09/28 and consider cardioversion if patient has persistent uncontrolled HR.   IV metoprolol 5 mg Q 4 hours  Continue home med metoprolol succinate 25 mg  Continue xarelto 20mg daily   Cardiology consult, appreciate recs  GEGE (acute kidney injury) (HCC) (Resolved: 9/27/2024)  Likely secondary to Afib RVR  Baseline Cr ~ 1.0  Cr on admission 1.34  S/p Lasix 20mg IV in ED  Continue to trend BMP  Avoid nephrotoxic agents  Avoid hypotension  Resolved on 09/27/2024, Cr trend down to 1.26  Transaminitis  Etiology unknown, denies ETOH consumption  AST 46, ALT 58 on admission  No intervention at this time  Recheck LFTs in AM  Lactic acidosis  Secondary to Afib RVR   Lactic on admission 4.2 > 2.2 > 3.6 > 2.3  SIRS criteria: Tachycardia, tachypnea - secondary to afib   No source of infection suspected   Bcx drawn in ED, result pending, U/A negative  Acute on chronic diastolic heart failure (HCC)  Wt Readings from Last 3 Encounters:   09/27/24 66.1 kg (145 lb 11.6 oz)   08/29/24 65.9 kg (145 lb 3.2 oz)   07/15/24  65 kg (143 lb 3.2 oz)       Echo 5/16/24: normal systolic function EF 55%, grade I diastolic dysfunction  Sudden onset of SOB and wheezing on presentation, 3L NC   CXR showed mild pulmonary vascular congestion  BNP elevated 854 on admission  S/p Lasix 20mg IV in ED  Continue to monitor volume status and reassess need for diuresis  Strict I&O  Daily weight  Prediabetes  A1c 6.3  Accu-checks ACHS with SSI  Nutrition consult  SIRS (systemic inflammatory response syndrome) (HCC)  In the setting of A Fib RVR  No source of infection  UA negative  Bcx pending  Lactic acidosis - trending down  No indication for abx  Disposition: Stepdown Level 1    ICU Core Measures     A: Assess, Prevent, and Manage Pain Has pain been assessed? Yes  Need for changes to pain regimen? No   B: Both SAT/SAT  N/A   C: Choice of Sedation RASS Goal: N/A patient not on sedation  Need for changes to sedation or analgesia regimen? NA   D: Delirium CAM-ICU: Negative   E: Early Mobility  Plan for early mobility? Yes   F: Family Engagement Plan for family engagement today? Yes         Prophylaxis:  VTE VTE covered by:  rivaroxaban, Oral, 15 mg at 09/27/24 0746       Stress Ulcer  not ordered         24 Hour Events   24hr events: Patient's rates remain in the 120s-140s despite an additional bolus of amion and Q6H doses of lopressor.  Patient has been NPO since midnight pending cardioversion today.       Objective                          Vitals I/O      Most Recent Min/Max in 24hrs   Temp 97.8 °F (36.6 °C) Temp  Min: 97.5 °F (36.4 °C)  Max: 98.5 °F (36.9 °C)   Pulse (!) 130 Pulse  Min: 116  Max: 166   Resp (!) 32 Resp  Min: 18  Max: 38   /83 BP  Min: 111/85  Max: 158/94   O2 Sat 95 % SpO2  Min: 92 %  Max: 98 %      Intake/Output Summary (Last 24 hours) at 9/28/2024 0339  Last data filed at 9/28/2024 0001  Gross per 24 hour   Intake 2236.63 ml   Output 3500 ml   Net -1263.37 ml       Diet NPO    Invasive Monitoring           Physical Exam    Physical Exam  Eyes:      General:         Right eye: No discharge.         Left eye: No discharge.      Extraocular Movements: Extraocular movements intact.      Pupils: Pupils are equal, round, and reactive to light.   Skin:     General: Skin is warm and dry.      Coloration: Skin is not jaundiced or pale.      Findings: No rash.   HENT:      Head: Normocephalic and atraumatic.      Nose: No congestion.      Mouth/Throat:      Mouth: Mucous membranes are moist.   Neck:      Vascular: No JVD.   no midline tenderness Cardiovascular:      Rate and Rhythm: Tachycardia present. Rhythm irregular.   Musculoskeletal:         General: No tenderness or deformity. Normal range of motion.   Abdominal: General: Bowel sounds are normal. There is no distension.      Palpations: Abdomen is soft.      Tenderness: There is no abdominal tenderness. There is no guarding.   Constitutional:       General: She is not in acute distress.     Appearance: She is well-developed and well-nourished. She is not ill-appearing.   Pulmonary:      Effort: Tachypnea present.      Breath sounds: Normal breath sounds.   Neurological:      General: No focal deficit present.      Mental Status: She is alert and oriented to person, place and time.   Genitourinary/Anorectal:  external catheter present.        Diagnostic Studies        Lab Results: I have reviewed the following results:      Medications:  Scheduled PRN   atorvastatin, 40 mg, Daily With Dinner  calcium carbonate, 1 tablet, Daily With Breakfast  chlorhexidine, 15 mL, Q12H CAMPOS  insulin lispro, 1-5 Units, 4x Daily (AC & HS)  metoprolol succinate, 25 mg, Daily  multivitamin-minerals, 1 tablet, Daily  rivaroxaban, 15 mg, Daily With Breakfast      metoprolol, 5 mg, Q4H PRN       Continuous    amiodarone (CORDARONE) 900 mg in dextrose 5 % 500 mL infusion, 0.5 mg/min, Last Rate: 0.5 mg/min (09/27/24 1409)         Labs:   CBC    Recent Labs     09/27/24  0210 09/27/24  0635   WBC 8.47 12.40*   HGB  14.3 14.8   HCT 44.1 44.1    347     BMP    Recent Labs     09/27/24  0635 09/27/24  1554   SODIUM 139 136   K 4.1 4.5    103   CO2 22 23   AGAP 12 10   BUN 24 25   CREATININE 1.26 1.26   CALCIUM 9.2 9.5       Coags    Recent Labs     09/27/24  0210   INR 1.86*   PTT 30        Additional Electrolytes  Recent Labs     09/27/24 0210 09/27/24  0635   MG 2.1 2.0   PHOS  --  3.5   CAIONIZED  --  1.07*          Blood Gas    No recent results  No recent results LFTs  Recent Labs     09/27/24  0210 09/27/24  1554   ALT 58* 53*   AST 46* 42*   ALKPHOS 91 75   ALB 4.0 3.9   TBILI 0.94 1.36*       Infectious  No recent results  Glucose  Recent Labs     09/27/24  0210 09/27/24  0635 09/27/24  1554   GLUC 300* 132 159*

## 2024-09-28 NOTE — ASSESSMENT & PLAN NOTE
Presented Afib RVR rate in 190s  Follows with [facility] cardiology and was recently seen in June 2024 after cardio-embolic stroke for ZIO patch, Afib appeared to be new at that time  In ED S/p Digoxin 250mcg x3, Metoprolol 5mg IV x 3, Diltiazem 15mg IV once, synchronized cardioversion x4 (100, 200, 200, 300J) in ED   Cardiology consulted, recommended admission on Cardizem gtt   Hemodynamically stable   Home regimen: xarelto 20mg daily, metoprolol succinate 25 mg daily   Diltiazem gtt d/c'd 9/27. Started on amiodarone IV with 150 mg bolus x2 and infusion (1 mg/hr for 6 hours, then 0.5 mg/hr for 18 hours).   Cardioversion 200J (9/28) - successful     Plan   Cardioversion completed 9/28 with conversion to NSR.   Amio held due to bradycardia and prolonged QTC after cardioversion  QTC re-evaluated and is 495. Resumed amiodarone with  mg TID.  Plan for amiodarone 200 mg PO BID at discharge and follow up with Cardiology in one week.  IV metoprolol 5 mg Q 4 hours PRN  Continue home med metoprolol succinate 25 mg  Continue xarelto 20mg daily   Cardiology consult, appreciate recs

## 2024-09-28 NOTE — PLAN OF CARE
Problem: PAIN - ADULT  Goal: Verbalizes/displays adequate comfort level or baseline comfort level  Description: Interventions:  - Encourage patient to monitor pain and request assistance  - Assess pain using appropriate pain scale  - Administer analgesics based on type and severity of pain and evaluate response  - Implement non-pharmacological measures as appropriate and evaluate response  - Consider cultural and social influences on pain and pain management  - Notify physician/advanced practitioner if interventions unsuccessful or patient reports new pain  Outcome: Progressing     Problem: INFECTION - ADULT  Goal: Absence or prevention of progression during hospitalization  Description: INTERVENTIONS:  - Assess and monitor for signs and symptoms of infection  - Monitor lab/diagnostic results  - Monitor all insertion sites, i.e. indwelling lines, tubes, and drains  - Monitor endotracheal if appropriate and nasal secretions for changes in amount and color  - [place] appropriate cooling/warming therapies per order  - Administer medications as ordered  - Instruct and encourage patient and family to use good hand hygiene technique  - Identify and instruct in appropriate isolation precautions for identified infection/condition  Outcome: Progressing  Goal: Absence of fever/infection during neutropenic period  Description: INTERVENTIONS:  - Monitor WBC    Outcome: Progressing     Problem: DISCHARGE PLANNING  Goal: Discharge to home or other facility with appropriate resources  Description: INTERVENTIONS:  - Identify barriers to discharge w/patient and caregiver  - Arrange for needed discharge resources and transportation as appropriate  - Identify discharge learning needs (meds, wound care, etc.)  - Arrange for interpretive services to assist at discharge as needed  - Refer to Case Management Department for coordinating discharge planning if the patient needs post-hospital services based on physician/advanced  practitioner order or complex needs related to functional status, cognitive ability, or social support system  Outcome: Progressing     Problem: Knowledge Deficit  Goal: Patient/family/caregiver demonstrates understanding of disease process, treatment plan, medications, and discharge instructions  Description: Complete learning assessment and assess knowledge base.  Interventions:  - Provide teaching at level of understanding  - Provide teaching via preferred learning methods  Outcome: Progressing     Problem: CARDIOVASCULAR - ADULT  Goal: Maintains optimal cardiac output and hemodynamic stability  Description: INTERVENTIONS:  - Monitor I/O, vital signs and rhythm  - Monitor for S/S and trends of decreased cardiac output  - Administer and titrate ordered vasoactive medications to optimize hemodynamic stability  - Assess quality of pulses, skin color and temperature  - Assess for signs of decreased coronary artery perfusion  - Instruct patient to report change in severity of symptoms  Outcome: Progressing  Goal: Absence of cardiac dysrhythmias or at baseline rhythm  Description: INTERVENTIONS:  - Continuous cardiac monitoring, vital signs, obtain 12 lead EKG if ordered  - Administer antiarrhythmic and heart rate control medications as ordered  - Monitor electrolytes and administer replacement therapy as ordered  Outcome: Progressing     Problem: RESPIRATORY - ADULT  Goal: Achieves optimal ventilation and oxygenation  Description: INTERVENTIONS:  - Assess for changes in respiratory status  - Assess for changes in mentation and behavior  - Position to facilitate oxygenation and minimize respiratory effort  - Oxygen administered by appropriate delivery if ordered  - Initiate smoking cessation education as indicated  - Encourage broncho-pulmonary hygiene including cough, deep breathe, Incentive Spirometry  - Assess the need for suctioning and aspirate as needed  - Assess and instruct to report SOB or any respiratory  difficulty  - Respiratory Therapy support as indicated  Outcome: Progressing     Problem: Prexisting or High Potential for Compromised Skin Integrity  Goal: Skin integrity is maintained or improved  Description: INTERVENTIONS:  - Identify patients at risk for skin breakdown  - Assess and monitor skin integrity  - Assess and monitor nutrition and hydration status  - Monitor labs   - Assess for incontinence   - Turn and reposition patient  - Assist with mobility/ambulation  - Relieve pressure over bony prominences  - Avoid friction and shearing  - Provide appropriate hygiene as needed including keeping skin clean and dry  - Evaluate need for skin moisturizer/barrier cream  - Collaborate with interdisciplinary team   - Patient/family teaching  - Consider wound care consult   Outcome: Progressing

## 2024-09-28 NOTE — RESPIRATORY THERAPY NOTE
Respiratory Note     09/28/24 1050   Respiratory Assessment   Resp Comments Called to room for cardioversion of pt in a-fib. Pt placed on 3LPM ETCO2 NC. ETCO2 34-38, RR 13-16, SpO2 98%. Pt successfully cardioverted. Chin tilt/jaw thrust maneuever post shock for pt with snoring respirations. 6LPM NC to maintain SpO2 >92%. Pt ETCO2 36, RR 14, HR 50, SpO2 95% post cardioversion. Pt remains on ETCO2 NC, respiratory dismissed.   Oxygen Therapy/Pulse Ox   O2 Device EtCO2 mask   Nasal Cannula O2 Flow Rate (L/min) 6 L/min   Calculated FIO2 (%) - Nasal Cannula 44   SpO2 95 %   SpO2 Activity At Rest   $ Pulse Oximetry Spot Check Charge Completed   EtCO2   ETCO2 (mmHg) 35 mmHg   EtCO2 High (mmHg) 45 mmHg   EtCO2 Low (mmHg) 19 mmHg   RR High 35   RR Low 10   SpO2 Alarm Limit High 100   SpO2 Alarm Limit Low 88   Apnea (sec) 20 sec   $ ETCO2 Non-Intubated  Yes

## 2024-09-28 NOTE — PLAN OF CARE
Problem: PAIN - ADULT  Goal: Verbalizes/displays adequate comfort level or baseline comfort level  Description: Interventions:  - Encourage patient to monitor pain and request assistance  - Assess pain using appropriate pain scale  - Administer analgesics based on type and severity of pain and evaluate response  - Implement non-pharmacological measures as appropriate and evaluate response  - Consider cultural and social influences on pain and pain management  - Notify physician/advanced practitioner if interventions unsuccessful or patient reports new pain  Outcome: Progressing     Problem: INFECTION - ADULT  Goal: Absence or prevention of progression during hospitalization  Description: INTERVENTIONS:  - Assess and monitor for signs and symptoms of infection  - Monitor lab/diagnostic results  - Monitor all insertion sites, i.e. indwelling lines, tubes, and drains  - Monitor endotracheal if appropriate and nasal secretions for changes in amount and color  - [place] appropriate cooling/warming therapies per order  - Administer medications as ordered  - Instruct and encourage patient and family to use good hand hygiene technique  - Identify and instruct in appropriate isolation precautions for identified infection/condition  Outcome: Progressing  Goal: Absence of fever/infection during neutropenic period  Description: INTERVENTIONS:  - Monitor WBC    Outcome: Progressing     Problem: DISCHARGE PLANNING  Goal: Discharge to home or other facility with appropriate resources  Description: INTERVENTIONS:  - Identify barriers to discharge w/patient and caregiver  - Arrange for needed discharge resources and transportation as appropriate  - Identify discharge learning needs (meds, wound care, etc.)  - Arrange for interpretive services to assist at discharge as needed  - Refer to Case Management Department for coordinating discharge planning if the patient needs post-hospital services based on physician/advanced  practitioner order or complex needs related to functional status, cognitive ability, or social support system  Outcome: Progressing     Problem: Knowledge Deficit  Goal: Patient/family/caregiver demonstrates understanding of disease process, treatment plan, medications, and discharge instructions  Description: Complete learning assessment and assess knowledge base.  Interventions:  - Provide teaching at level of understanding  - Provide teaching via preferred learning methods  Outcome: Progressing     Problem: CARDIOVASCULAR - ADULT  Goal: Maintains optimal cardiac output and hemodynamic stability  Description: INTERVENTIONS:  - Monitor I/O, vital signs and rhythm  - Monitor for S/S and trends of decreased cardiac output  - Administer and titrate ordered vasoactive medications to optimize hemodynamic stability  - Assess quality of pulses, skin color and temperature  - Assess for signs of decreased coronary artery perfusion  - Instruct patient to report change in severity of symptoms  Outcome: Progressing  Goal: Absence of cardiac dysrhythmias or at baseline rhythm  Description: INTERVENTIONS:  - Continuous cardiac monitoring, vital signs, obtain 12 lead EKG if ordered  - Administer antiarrhythmic and heart rate control medications as ordered  - Monitor electrolytes and administer replacement therapy as ordered  Outcome: Progressing     Problem: RESPIRATORY - ADULT  Goal: Achieves optimal ventilation and oxygenation  Description: INTERVENTIONS:  - Assess for changes in respiratory status  - Assess for changes in mentation and behavior  - Position to facilitate oxygenation and minimize respiratory effort  - Oxygen administered by appropriate delivery if ordered  - Initiate smoking cessation education as indicated  - Encourage broncho-pulmonary hygiene including cough, deep breathe, Incentive Spirometry  - Assess the need for suctioning and aspirate as needed  - Assess and instruct to report SOB or any respiratory  difficulty  - Respiratory Therapy support as indicated  Outcome: Progressing

## 2024-09-28 NOTE — ASSESSMENT & PLAN NOTE
Secondary to Afib RVR   Lactic on admission 4.2 > 2.2 > 3.6 > 2.3  SIRS criteria: Tachycardia, tachypnea - secondary to afib   No source of infection suspected   Bcx drawn in ED, result pending, U/A negative  Resolved

## 2024-09-29 VITALS
DIASTOLIC BLOOD PRESSURE: 58 MMHG | BODY MASS INDEX: 27.87 KG/M2 | WEIGHT: 141.98 LBS | SYSTOLIC BLOOD PRESSURE: 127 MMHG | OXYGEN SATURATION: 97 % | HEART RATE: 68 BPM | RESPIRATION RATE: 28 BRPM | HEIGHT: 60 IN | TEMPERATURE: 98 F

## 2024-09-29 PROBLEM — I48.91 ATRIAL FIBRILLATION WITH RAPID VENTRICULAR RESPONSE (HCC): Status: RESOLVED | Noted: 2024-09-27 | Resolved: 2024-09-29

## 2024-09-29 PROBLEM — I50.33 ACUTE ON CHRONIC DIASTOLIC HEART FAILURE (HCC): Status: RESOLVED | Noted: 2024-09-27 | Resolved: 2024-09-29

## 2024-09-29 PROBLEM — R74.01 TRANSAMINITIS: Status: RESOLVED | Noted: 2024-09-27 | Resolved: 2024-09-29

## 2024-09-29 PROBLEM — R65.10 SIRS (SYSTEMIC INFLAMMATORY RESPONSE SYNDROME) (HCC): Status: RESOLVED | Noted: 2024-09-27 | Resolved: 2024-09-29

## 2024-09-29 LAB
ANION GAP SERPL CALCULATED.3IONS-SCNC: 6 MMOL/L (ref 4–13)
BUN SERPL-MCNC: 37 MG/DL (ref 5–25)
CALCIUM SERPL-MCNC: 9 MG/DL (ref 8.4–10.2)
CHLORIDE SERPL-SCNC: 103 MMOL/L (ref 96–108)
CO2 SERPL-SCNC: 29 MMOL/L (ref 21–32)
CREAT SERPL-MCNC: 1.24 MG/DL (ref 0.6–1.3)
ERYTHROCYTE [DISTWIDTH] IN BLOOD BY AUTOMATED COUNT: 13.8 % (ref 11.6–15.1)
GFR SERPL CREATININE-BSD FRML MDRD: 43 ML/MIN/1.73SQ M
GLUCOSE SERPL-MCNC: 85 MG/DL (ref 65–140)
GLUCOSE SERPL-MCNC: 86 MG/DL (ref 65–140)
GLUCOSE SERPL-MCNC: 93 MG/DL (ref 65–140)
HCT VFR BLD AUTO: 40.8 % (ref 34.8–46.1)
HGB BLD-MCNC: 13.4 G/DL (ref 11.5–15.4)
MAGNESIUM SERPL-MCNC: 2 MG/DL (ref 1.9–2.7)
MCH RBC QN AUTO: 30.1 PG (ref 26.8–34.3)
MCHC RBC AUTO-ENTMCNC: 32.8 G/DL (ref 31.4–37.4)
MCV RBC AUTO: 92 FL (ref 82–98)
PHOSPHATE SERPL-MCNC: 3 MG/DL (ref 2.3–4.1)
PLATELET # BLD AUTO: 304 THOUSANDS/UL (ref 149–390)
PMV BLD AUTO: 10.5 FL (ref 8.9–12.7)
POTASSIUM SERPL-SCNC: 4.4 MMOL/L (ref 3.5–5.3)
RBC # BLD AUTO: 4.45 MILLION/UL (ref 3.81–5.12)
SODIUM SERPL-SCNC: 138 MMOL/L (ref 135–147)
WBC # BLD AUTO: 12.11 THOUSAND/UL (ref 4.31–10.16)

## 2024-09-29 PROCEDURE — 83735 ASSAY OF MAGNESIUM: CPT | Performed by: PHYSICIAN ASSISTANT

## 2024-09-29 PROCEDURE — 80048 BASIC METABOLIC PNL TOTAL CA: CPT | Performed by: PHYSICIAN ASSISTANT

## 2024-09-29 PROCEDURE — 84100 ASSAY OF PHOSPHORUS: CPT | Performed by: PHYSICIAN ASSISTANT

## 2024-09-29 PROCEDURE — 85027 COMPLETE CBC AUTOMATED: CPT | Performed by: PHYSICIAN ASSISTANT

## 2024-09-29 PROCEDURE — 82948 REAGENT STRIP/BLOOD GLUCOSE: CPT

## 2024-09-29 PROCEDURE — 93005 ELECTROCARDIOGRAM TRACING: CPT

## 2024-09-29 PROCEDURE — 99238 HOSP IP/OBS DSCHRG MGMT 30/<: CPT | Performed by: STUDENT IN AN ORGANIZED HEALTH CARE EDUCATION/TRAINING PROGRAM

## 2024-09-29 RX ORDER — AMIODARONE HYDROCHLORIDE 200 MG/1
200 TABLET ORAL 2 TIMES DAILY WITH MEALS
Status: DISCONTINUED | OUTPATIENT
Start: 2024-09-29 | End: 2024-09-29 | Stop reason: HOSPADM

## 2024-09-29 RX ORDER — AMIODARONE HYDROCHLORIDE 200 MG/1
200 TABLET ORAL 2 TIMES DAILY WITH MEALS
Qty: 60 TABLET | Refills: 0 | Status: SHIPPED | OUTPATIENT
Start: 2024-09-29 | End: 2024-10-29

## 2024-09-29 RX ADMIN — AMIODARONE HYDROCHLORIDE 200 MG: 200 TABLET ORAL at 08:00

## 2024-09-29 RX ADMIN — CALCIUM 1 TABLET: 500 TABLET ORAL at 08:00

## 2024-09-29 RX ADMIN — METOPROLOL SUCCINATE 25 MG: 25 TABLET, EXTENDED RELEASE ORAL at 08:00

## 2024-09-29 RX ADMIN — RIVAROXABAN 15 MG: 15 TABLET, FILM COATED ORAL at 08:00

## 2024-09-29 RX ADMIN — CHLORHEXIDINE GLUCONATE 15 ML: 1.2 RINSE ORAL at 08:00

## 2024-09-29 RX ADMIN — Medication 1 TABLET: at 08:00

## 2024-09-29 NOTE — PLAN OF CARE
Problem: PAIN - ADULT  Goal: Verbalizes/displays adequate comfort level or baseline comfort level  Description: Interventions:  - Encourage patient to monitor pain and request assistance  - Assess pain using appropriate pain scale  - Administer analgesics based on type and severity of pain and evaluate response  - Implement non-pharmacological measures as appropriate and evaluate response  - Consider cultural and social influences on pain and pain management  - Notify physician/advanced practitioner if interventions unsuccessful or patient reports new pain  Outcome: Adequate for Discharge     Problem: INFECTION - ADULT  Goal: Absence or prevention of progression during hospitalization  Description: INTERVENTIONS:  - Assess and monitor for signs and symptoms of infection  - Monitor lab/diagnostic results  - Monitor all insertion sites, i.e. indwelling lines, tubes, and drains  - Monitor endotracheal if appropriate and nasal secretions for changes in amount and color  - [place] appropriate cooling/warming therapies per order  - Administer medications as ordered  - Instruct and encourage patient and family to use good hand hygiene technique  - Identify and instruct in appropriate isolation precautions for identified infection/condition  Outcome: Adequate for Discharge  Goal: Absence of fever/infection during neutropenic period  Description: INTERVENTIONS:  - Monitor WBC    Outcome: Adequate for Discharge     Problem: DISCHARGE PLANNING  Goal: Discharge to home or other facility with appropriate resources  Description: INTERVENTIONS:  - Identify barriers to discharge w/patient and caregiver  - Arrange for needed discharge resources and transportation as appropriate  - Identify discharge learning needs (meds, wound care, etc.)  - Arrange for interpretive services to assist at discharge as needed  - Refer to Case Management Department for coordinating discharge planning if the patient needs post-hospital services  based on physician/advanced practitioner order or complex needs related to functional status, cognitive ability, or social support system  Outcome: Adequate for Discharge     Problem: Knowledge Deficit  Goal: Patient/family/caregiver demonstrates understanding of disease process, treatment plan, medications, and discharge instructions  Description: Complete learning assessment and assess knowledge base.  Interventions:  - Provide teaching at level of understanding  - Provide teaching via preferred learning methods  Outcome: Adequate for Discharge     Problem: CARDIOVASCULAR - ADULT  Goal: Maintains optimal cardiac output and hemodynamic stability  Description: INTERVENTIONS:  - Monitor I/O, vital signs and rhythm  - Monitor for S/S and trends of decreased cardiac output  - Administer and titrate ordered vasoactive medications to optimize hemodynamic stability  - Assess quality of pulses, skin color and temperature  - Assess for signs of decreased coronary artery perfusion  - Instruct patient to report change in severity of symptoms  Outcome: Adequate for Discharge  Goal: Absence of cardiac dysrhythmias or at baseline rhythm  Description: INTERVENTIONS:  - Continuous cardiac monitoring, vital signs, obtain 12 lead EKG if ordered  - Administer antiarrhythmic and heart rate control medications as ordered  - Monitor electrolytes and administer replacement therapy as ordered  Outcome: Adequate for Discharge     Problem: RESPIRATORY - ADULT  Goal: Achieves optimal ventilation and oxygenation  Description: INTERVENTIONS:  - Assess for changes in respiratory status  - Assess for changes in mentation and behavior  - Position to facilitate oxygenation and minimize respiratory effort  - Oxygen administered by appropriate delivery if ordered  - Initiate smoking cessation education as indicated  - Encourage broncho-pulmonary hygiene including cough, deep breathe, Incentive Spirometry  - Assess the need for suctioning and  aspirate as needed  - Assess and instruct to report SOB or any respiratory difficulty  - Respiratory Therapy support as indicated  Outcome: Adequate for Discharge     Problem: Prexisting or High Potential for Compromised Skin Integrity  Goal: Skin integrity is maintained or improved  Description: INTERVENTIONS:  - Identify patients at risk for skin breakdown  - Assess and monitor skin integrity  - Assess and monitor nutrition and hydration status  - Monitor labs   - Assess for incontinence   - Turn and reposition patient  - Assist with mobility/ambulation  - Relieve pressure over bony prominences  - Avoid friction and shearing  - Provide appropriate hygiene as needed including keeping skin clean and dry  - Evaluate need for skin moisturizer/barrier cream  - Collaborate with interdisciplinary team   - Patient/family teaching  - Consider wound care consult   Outcome: Adequate for Discharge

## 2024-09-29 NOTE — PLAN OF CARE
Pt remains NSR on monitor. No arrhythmias at rest or with activity/ ambulation. Pt does not have any respiratory decline with activity. Pt remains on room air. Pt denies shortness of breath with activity and also no symptoms at rest.     Problem: CARDIOVASCULAR - ADULT  Goal: Maintains optimal cardiac output and hemodynamic stability  Description: INTERVENTIONS:  - Monitor I/O, vital signs and rhythm  - Monitor for S/S and trends of decreased cardiac output  - Administer and titrate ordered vasoactive medications to optimize hemodynamic stability  - Assess quality of pulses, skin color and temperature  - Assess for signs of decreased coronary artery perfusion  - Instruct patient to report change in severity of symptoms  Outcome: Progressing  Goal: Absence of cardiac dysrhythmias or at baseline rhythm  Description: INTERVENTIONS:  - Continuous cardiac monitoring, vital signs, obtain 12 lead EKG if ordered  - Administer antiarrhythmic and heart rate control medications as ordered  - Monitor electrolytes and administer replacement therapy as ordered  Outcome: Progressing     Problem: RESPIRATORY - ADULT  Goal: Achieves optimal ventilation and oxygenation  Description: INTERVENTIONS:  - Assess for changes in respiratory status  - Assess for changes in mentation and behavior  - Position to facilitate oxygenation and minimize respiratory effort  - Oxygen administered by appropriate delivery if ordered  - Initiate smoking cessation education as indicated  - Encourage broncho-pulmonary hygiene including cough, deep breathe, Incentive Spirometry  - Assess the need for suctioning and aspirate as needed  - Assess and instruct to report SOB or any respiratory difficulty  - Respiratory Therapy support as indicated  Outcome: Progressing

## 2024-09-29 NOTE — ASSESSMENT & PLAN NOTE
In the setting of A Fib RVR  No source of infection  UA negative  Bcx - NGTD  Lactic acidosis - resolved.  No indication for abx

## 2024-09-29 NOTE — ASSESSMENT & PLAN NOTE
Wt Readings from Last 3 Encounters:   09/29/24 64.4 kg (141 lb 15.6 oz)   08/29/24 65.9 kg (145 lb 3.2 oz)   07/15/24 65 kg (143 lb 3.2 oz)       Echo 5/16/24: normal systolic function EF 55%, grade I diastolic dysfunction  Sudden onset of SOB and wheezing on presentation, 3L NC   CXR showed mild pulmonary vascular congestion  BNP elevated 854 on admission  S/p Lasix 20mg IV in ED  Continue to monitor volume status and reassess need for diuresis  Strict I&O  Daily weight

## 2024-09-29 NOTE — DISCHARGE SUMMARY
Discharge Summary - Critical Care/ICU   Name: Radha Dodson 74 y.o. female I MRN: [id number]  Unit/Bed#: -01 I Date of Admission: 9/27/2024   Date of Service: 9/29/2024 I Hospital Day: 2     Assessment & Plan  Atrial fibrillation with rapid ventricular response (HCC)  Presented Afib RVR rate in 190s  Follows with [facility] cardiology and was recently seen in June 2024 after cardio-embolic stroke for ZIO patch, Afib appeared to be new at that time  In ED S/p Digoxin 250mcg x3, Metoprolol 5mg IV x 3, Diltiazem 15mg IV once, synchronized cardioversion x4 (100, 200, 200, 300J) in ED   Cardiology consulted, recommended admission on Cardizem gtt   Hemodynamically stable   Home regimen: xarelto 20mg daily, metoprolol succinate 25 mg daily   Diltiazem gtt d/c'd 9/27. Started on amiodarone IV with 150 mg bolus x2 and infusion (1 mg/hr for 6 hours, then 0.5 mg/hr for 18 hours).   Cardioversion 200J (9/28) - successful     Plan   Cardioversion completed 9/28 with conversion to NSR.   Amio held due to bradycardia and prolonged QTC after cardioversion  QTC re-evaluated and is 495. Resumed amiodarone with  mg TID.  Plan for amiodarone 200 mg PO BID at discharge and follow up with Cardiology in one week.  IV metoprolol 5 mg Q 4 hours PRN  Continue home med metoprolol succinate 25 mg  Continue xarelto 20mg daily   Cardiology consult, appreciate recs  Transaminitis  Etiology unknown, denies ETOH consumption  AST 46, ALT 58 on admission  No intervention at this time  Lactic acidosis (Resolved: 9/28/2024)  Secondary to Afib RVR   Lactic on admission 4.2 > 2.2 > 3.6 > 2.3  SIRS criteria: Tachycardia, tachypnea - secondary to afib   No source of infection suspected   Bcx drawn in ED, result pending, U/A negative  Resolved  Acute on chronic diastolic heart failure (HCC)  Wt Readings from Last 3 Encounters:   09/29/24 64.4 kg (141 lb 15.6 oz)   08/29/24 65.9 kg (145 lb 3.2 oz)   07/15/24 65 kg (143 lb 3.2 oz)       Echo 5/16/24:  normal systolic function EF 55%, grade I diastolic dysfunction  Sudden onset of SOB and wheezing on presentation, 3L NC   CXR showed mild pulmonary vascular congestion  BNP elevated 854 on admission  S/p Lasix 20mg IV in ED  Continue to monitor volume status and reassess need for diuresis  Strict I&O  Daily weight  Prediabetes  A1c 6.3  Accu-checks ACHS with SSI  Nutrition consult  SIRS (systemic inflammatory response syndrome) ([facility])  In the setting of A Fib RVR  No source of infection  UA negative  Bcx - NGTD  Lactic acidosis - resolved.  No indication for abx    Admission Date: 9/27/2024 0204  Discharge Date: 09/29/24  Admitting Diagnosis: CHF (congestive heart failure) ([facility]) [I50.9]  SOB (shortness of breath) [R06.02]  Atrial fibrillation with RVR ([facility]) [I48.91]  Discharge Diagnosis:   Medical Problems       Resolved Problems  Date Reviewed: 9/29/2024            Resolved    GEGE (acute kidney injury) ([facility]) 9/27/2024     Resolved by  ERAN Menchaca    Lactic acidosis 9/28/2024     Resolved by  ERAN Menchaca          HPI: Patient is a 74-year-old female with a history of atrial fibrillation on Xarelto and right MCA stroke in May 2020 for who was admitted to the critical care service after having atrial fibrillation with RVR requiring a Cardizem drip.  At the time of admission she reported going to bed the evening before and she had chest pressure and shortness of breath which did not resolved and prompted her to report to the emergency department.  She reports being compliant with her Xarelto and metoprolol and her last doses were on the day prior to admission.  She reported having symptoms of upper respiratory infection, dry cough and rhinorrhea last week.  Upon evaluation in the emergency department, she was found to be in atrial fibrillation with RVR with rates in the 190s.  She was hemodynamically stable.  She was attempted to be rate controlled with metoprolol 5 mg IV x 3, digoxin 250 mcg IV  x 3 and Cardizem 15 mg IV x 1.  The attempts of these medications were unsuccessful and the decision was made to attempt synchronized cardioversion.  4 attempts were made to electively cardiovert her with a brief return to normal sinus rhythm after each attempt.  Unfortunately, after each attempt she returned to atrial fibrillation with RVR.  She was admitted to the critical care service where she was placed on an amiodarone drip and received multiple amiodarone boluses.  On 9/28/2024, cardioversion was again attempted and was successful in converting her to normal sinus rhythm.  She was transition to p.o. amiodarone with a plan to discharge her on amiodarone 200 mg twice daily.  She is to follow-up with cardiology upon discharge.    Procedures Performed:   Orders Placed This Encounter   Procedures    Critical Care    ED ECG Documentation Only    Electrical Cardioversion    Electrical Cardioversion    Pre-Procedural Sedation    Procedural Sedation       Summary of Hospital Course: See above    Significant Findings, Care, Treatment and Services Provided:     9/27 CXR: mild pulmonary vascular congestion  9/27: chemical rate control attempted on Dig x3, metoprolol x3, cardizem x 1. Electrical cardioversion x 4 unsuccessful, Cardizem gtt; later titrate down diltiazem and switched to amiodarone, bolus x 1 and on 1mg drip for first 6 hrs then 0.5mg for the rest of the day  9/28 Cardioversion 200J-NSR  9/28 Amio IV >> amio po 200 TID  9/29 Amio  BID at d/c    Discharge Day Visit / Exam:   /65   Pulse 61   Temp 98 °F (36.7 °C) (Temporal)   Resp 22   Ht 5' (1.524 m)   Wt 64.4 kg (141 lb 15.6 oz)   SpO2 92%   BMI 27.73 kg/m²   Physical Exam  Constitutional:       General: She is not in acute distress.     Appearance: She is not ill-appearing or diaphoretic.   HENT:      Head: Normocephalic and atraumatic.   Eyes:      General:         Right eye: No discharge.         Left eye: No discharge.      Extraocular  Movements: Extraocular movements intact.      Pupils: Pupils are equal, round, and reactive to light.   Cardiovascular:      Rate and Rhythm: Normal rate and regular rhythm.   Pulmonary:      Effort: Pulmonary effort is normal.      Breath sounds: Normal breath sounds.   Abdominal:      General: Bowel sounds are normal. There is no distension.      Palpations: Abdomen is soft.      Tenderness: There is no abdominal tenderness. There is no guarding.   Musculoskeletal:         General: No deformity or signs of injury. Normal range of motion.      Cervical back: Normal range of motion. No tenderness.   Skin:     General: Skin is warm and dry.      Coloration: Skin is not jaundiced or pale.      Findings: No rash.   Neurological:      General: No focal deficit present.      Mental Status: She is oriented to person, place, and time.         Condition at Discharge: good       Discharge instructions/Information to patient and family:   See After Visit Summary (AVS) for information provided to patient and family.      Provisions for Follow-Up Care:  See after visit summary for information related to follow-up care and any pertinent home health orders.      PCP: Ketan Mahmood MD    Disposition: Home    Planned Readmission: No     Discharge Medications:  See after visit summary for reconciled discharge medications provided to patient and family.      Discharge Statement:  I have spent a total time of 20 minutes in caring for this patient on the day of the visit/encounter.

## 2024-09-29 NOTE — UTILIZATION REVIEW
Initial Clinical Review    Admission: Date/Time/Statement:   Admission Orders (From admission, onward)       Ordered        09/27/24 0436  INPATIENT ADMISSION  Once                          Orders Placed This Encounter   Procedures    INPATIENT ADMISSION     Standing Status:   Standing     Number of Occurrences:   1     Order Specific Question:   Level of Care     Answer:   Level 1 Stepdown [13]     Order Specific Question:   Estimated length of stay     Answer:   More than 2 Midnights     Order Specific Question:   Certification     Answer:   I certify that inpatient services are medically necessary for this patient for a duration of greater than two midnights. See H&P and MD Progress Notes for additional information about the patient's course of treatment.     ED Arrival Information       Expected   -    Arrival   9/27/2024 02:03    Acuity   Urgent              Means of arrival   Ambulance    Escorted by   [facility]    Service   Anesthesiology    Admission type   Emergency              Arrival complaint   SOB             Chief Complaint   Patient presents with    Shortness of Breath     Pt had cold sx for several days. Pt had sudden onset SOB and wheezing tonight. Afib RVR and stroke 1 month ago. Got 1lbuterol and 1 douneb, 125mg Solumedrol. 25mg Cardizem       Initial Presentation: 74 y.o. female with PMH of Hx of Afib on Xarelto and R MCA stroke in May 2024 who presented to the ED from home with complaint of shortness of breath and chest pressure.  Upon arrival to the ED she was found to be in Afib RVR rates in 190s, hemodynamically stable. Attempted rate control with metoprolol 5mg IV x 3, Digoxin 250mcg IV x 3, and Cardizem 15mg IV once unsuccessfully. Synchronized electrical cardioversion was attempted 4 times (100, 200, 200, 300J) with a brief return to NSR after each, but then returned to Afib RVR. ED labs revealed elevated creatinine of 1.34, lactic acid of 4.2. and BMP of 854.  Cardiology was  consulted in ED who recommended she be admitted on Cardizem gtt. Exam:  AOx3. Tachycardia, rhythm irregular.     9/27 Inpatient admission for evaluation and treatment of atrial fibrillation with RVR, GEGE (baseline creatinine ~ 1.0), lactic acidosis.       9/27 Cardiology consult:  Patient with recently diagnosed paroxysmal atrial fibrillation who is admitted with symptomatic rapid atrial fibrillation and heart failure. No evidence of any acute coronary syndrome.  History of prior right MCA stroke secondary to previously undiagnosed atrial fibrillation.  Plan:  Patient was shocked 4 times in the ER with no success in restoring sinus rhythm.  We will plan for intravenous amiodarone loading and then reattempt cardioversion in 24 to 48 hours.  She has been on Xarelto since July 16, 2024.    9/28 Critical Care:  Patient's rates remain in the 120s-140s despite an additional bolus of amion and doses of lopressor. Patient has been NPO since midnight.  Successfully cardioverted this am . DC IV amio and start PO 200mg TID  Continue xarelto daily. GEGE resolved.     9/29  Day 3: Has surpassed a 2nd midnight with active treatments and services. Discharge Summary:  74-year-old female with a history of atrial fibrillation on Xarelto and right MCA stroke in May 2020 for who was admitted to the critical care service after having atrial fibrillation with RVR requiring a Cardizem drip.   She was placed on an amiodarone drip and received multiple amiodarone boluses.  On 9/28/2024, cardioversion was again attempted and was successful in converting her to normal sinus rhythm.  She was transitioned to PO. amiodarone with a plan to discharge her on amiodarone 200 mg twice daily.  She is to follow-up with cardiology upon discharge.       9/29 9866 Discharged to home/self care.         ED Treatment-Medication Administration from 09/27/2024 0201 to 09/27/2024 0547         Date/Time Order Dose Route Action     09/27/2024 0215 metoprolol  (LOPRESSOR) injection 5 mg 5 mg Intravenous Given     09/27/2024 0215 diltiazem (CARDIZEM) injection 15 mg 15 mg Intravenous Given     09/27/2024 0210 albuterol (FOR EMS ONLY) (2.5 mg/3 mL) 0.083 % inhalation solution 2.5 mg 0 mg Does not apply Given to EMS     09/27/2024 0210 methylPREDNISolone sodium succinate (FOR EMS ONLY) (Solu-MEDROL) 125 MG injection 125 mg 0 mg Does not apply Given to EMS     09/27/2024 0210 ipratropium-albuterol (FOR EMS ONLY) (DUO-NEB) 0.5-2.5 mg/3 mL inhalation solution 3 mL 0 mL Does not apply Given to EMS     09/27/2024 0508 furosemide (LASIX) injection 20 mg 20 mg Intravenous Given     09/27/2024 0253 metoprolol (LOPRESSOR) injection 5 mg 5 mg Intravenous Given     09/27/2024 0349 diltiazem (CARDIZEM) 125 mg in sodium chloride 0.9 % 125 mL infusion 5 mg/hr Intravenous New Bag     09/27/2024 0400 diltiazem (CARDIZEM) 125 mg in sodium chloride 0.9 % 125 mL infusion 7.5 mg/hr Intravenous Rate/Dose Change     09/27/2024 0431 diltiazem (CARDIZEM) 125 mg in sodium chloride 0.9 % 125 mL infusion 7.5 mg/hr Intravenous Restarted     09/27/2024 0235 digoxin (LANOXIN) injection 250 mcg 250 mcg Intravenous Given     09/27/2024 0306 digoxin (LANOXIN) injection 250 mcg 250 mcg Intravenous Given     09/27/2024 0323 sodium chloride 0.9 % bolus 250 mL 250 mL Intravenous New Bag     09/27/2024 0425 etomidate (AMIDATE) 2 mg/mL injection 20.8 mg 20 mg Intravenous Given     09/27/2024 0440 metoprolol (LOPRESSOR) injection 5 mg 5 mg Intravenous Given     09/27/2024 0448 digoxin (LANOXIN) injection 250 mcg 250 mcg Intravenous Given            Scheduled Medications:      amiodarone, 200 mg, Oral, BID With Meals  atorvastatin, 40 mg, Oral, Daily With Dinner  calcium carbonate, 1 tablet, Oral, Daily With Breakfast  chlorhexidine, 15 mL, Mouth/Throat, Q12H CAMPOS  insulin lispro, 1-5 Units, Subcutaneous, 4x Daily (AC & HS)  metoprolol succinate, 25 mg, Oral, Daily  multivitamin-minerals, 1 tablet, Oral,  Daily  rivaroxaban, 15 mg, Oral, Daily With Breakfast      Continuous IV Infusions:    amiodarone (CORDARONE) 900 mg in dextrose 5 % 500 mL infusion  Rate: 33.3 mL/hr Dose: 1 mg/min  Freq: Continuous Route: IV  Last Dose: Stopped (09/27/24 1409)  Start: 09/27/24 0815 End: 09/27/24 1445    Followed by     amiodarone (CORDARONE) 900 mg in dextrose 5 % 500 mL infusion  Rate: 16.7 mL/hr Dose: 0.5 mg/min  Freq: Continuous Route: IV  Last Dose: Stopped (09/28/24 1020)  Start: 09/27/24 1446 End: 09/28/24 1037      diltiazem (CARDIZEM) 125 mg in sodium chloride 0.9 % 125 mL infusion  Rate: 1-15 mL/hr Dose: 1-15 mg/hr  Freq: Titrated Route: IV  Last Dose: Stopped (09/27/24 1001)  Start: 09/27/24 0245 End: 09/27/24 1006      PRN Meds:    metoprolol, 5 mg, Intravenous, Q4H PRN x 1 dose 9/27 @ 1137, x 1 dose 9/28 @ 0007      ED Triage Vitals   Temperature Pulse Respirations Blood Pressure SpO2 Pain Score   09/27/24 0204 09/27/24 0204 09/27/24 0204 09/27/24 0204 09/27/24 0204 09/27/24 0555   (!) 97.3 °F (36.3 °C) 77 19 133/84 93 % No Pain     Weight (last 2 days) before discharge       Date/Time Weight    09/29/24 0434 64.4 (141.98)    09/28/24 0500 64.6 (142.42)    09/27/24 0555 66.1 (145.72)    09/27/24 0204 69.3 (152.78)            Vital Signs (last 3 days) before discharge       Date/Time Temp Pulse Resp BP MAP (mmHg) SpO2 Calculated FIO2 (%) - Nasal Cannula Nasal Cannula O2 Flow Rate (L/min) O2 Device Pain    09/29/24 1139 -- 68 28 127/58 84 97 % -- -- None (Room air) --    09/29/24 0755 -- 64 21 149/79 102 95 % -- -- None (Room air) No Pain    09/29/24 0451 98 °F (36.7 °C) 61 22 137/65 94 92 % -- -- None (Room air) --    09/29/24 0434 -- -- -- -- -- -- -- -- -- No Pain    09/29/24 0149 -- 66 20 130/64 90 95 % -- -- -- --    09/28/24 2217 -- 60 22 136/71 96 97 % -- -- None (Room air) No Pain    09/28/24 2009 98.2 °F (36.8 °C) 61 28 113/64 83 96 % -- -- None (Room air) --    09/28/24 2000 -- -- -- -- -- -- -- -- -- No Pain     09/28/24 1350 -- 72 22 -- -- 93 % -- -- None (Room air) --    09/28/24 1200 -- -- -- -- -- -- 36 4 L/min EtCO2 nasal cannula --    09/28/24 1130 -- -- -- 145/62 89 -- -- -- -- --    09/28/24 1110 98.1 °F (36.7 °C) 65 22 123/70 91 100 % 36 4 L/min Nasal cannula --    09/28/24 1105 -- 54 17 116/66 86 99 % -- -- -- --    09/28/24 1101 -- 52 14 109/56 77 99 % -- -- -- --    09/28/24 1055 -- 48 14 95/55 70 98 % -- -- -- --    09/28/24 1050 -- 48 14 86/52 64 95 % 44 6 L/min EtCO2 mask --    09/28/24 1045 -- 48 19 95/51 68 98 % -- -- -- --    09/28/24 1040 -- 48 13 95/50 69 98 % -- -- -- --    09/28/24 1035 -- 50 35 98/55 73 98 % -- -- -- --    09/28/24 1030 -- 52 21 96/55 72 95 % -- -- -- --    09/28/24 1025 -- 138 25 155/89 117 100 % -- -- -- --    09/28/24 1020 -- 131 30 166/84 113 99 % 44 6 L/min -- --    09/28/24 1015 -- 131 29 -- -- 98 % -- -- -- --    09/28/24 1010 -- 134 38 -- -- 98 % -- -- -- --    09/28/24 1005 -- 134 41 -- -- 97 % -- -- -- --    09/28/24 1000 -- 131 38 121/76 93 97 % -- -- -- --    09/28/24 0830 -- -- -- -- -- -- -- -- -- No Pain    09/28/24 0824 -- 141 -- -- -- -- -- -- -- --    09/28/24 0800 -- 136 39 134/96 108 97 % -- -- -- --    09/28/24 0700 98.3 °F (36.8 °C) 132 30 126/89 104 97 % 28 2 L/min Nasal cannula --    09/28/24 0600 -- 140 28 131/74 94 98 % -- -- -- --    09/28/24 0500 -- 134 28 118/74 92 97 % -- -- -- --    09/28/24 0400 98 °F (36.7 °C) 132 26 117/74 86 98 % 28 2 L/min Nasal cannula No Pain    09/28/24 0300 -- 131 26 110/63 80 97 % -- -- -- --    09/28/24 0200 -- 130 32 113/83 95 95 % -- -- -- --    09/28/24 0100 -- 143 33 116/81 95 94 % -- -- -- --    09/28/24 0000 97.8 °F (36.6 °C) 136 21 116/74 90 95 % 28 2 L/min Nasal cannula No Pain    09/27/24 2300 -- 135 22 133/87 105 94 % -- -- -- --    09/27/24 2200 -- 138 30 126/89 104 95 % -- -- -- --    09/27/24 2100 -- 139 32 124/73 94 93 % -- -- -- --    09/27/24 2000 -- 139 32 122/77 93 93 % -- -- -- No Pain    09/27/24 1900  98.5 °F (36.9 °C) 142 24 128/98 109 94 % -- -- -- --    09/27/24 1800 -- 156 24 149/98 111 95 % -- -- -- --    09/27/24 1700 -- 143 21 126/88 103 94 % -- -- -- --    09/27/24 1600 -- 145 27 129/83 99 93 % -- -- -- --    09/27/24 1502 98.4 °F (36.9 °C) 145 36 127/78 97 94 % 28 2 L/min Nasal cannula No Pain    09/27/24 1400 -- 134 29 126/81 99 93 % -- -- -- --    09/27/24 1300 -- 132 25 126/80 97 94 % -- -- -- --    09/27/24 1230 -- 141 20 118/78 94 94 % -- -- -- --    09/27/24 1200 -- 132 26 128/76 99 95 % -- -- -- --    09/27/24 1130 -- 138 27 113/84 97 95 % -- -- -- --    09/27/24 1100 -- 135 20 140/85 108 95 % -- -- -- --    09/27/24 1046 97.9 °F (36.6 °C) -- -- -- -- -- -- -- -- --    09/27/24 1030 -- 138 23 146/77 104 94 % -- -- -- --    09/27/24 1000 -- 140 24 138/88 108 94 % 28 2 L/min Nasal cannula --    09/27/24 0930 -- 145 20 142/79 102 94 % -- -- -- --    09/27/24 0900 -- 141 31 141/87 106 94 % -- -- -- --    09/27/24 0830 -- 160 34 158/94 118 94 % 28 2 L/min Nasal cannula No Pain    09/27/24 0800 -- 164 38 140/86 108 94 % -- -- -- --    09/27/24 0754 -- 166 34 149/92 114 95 % -- -- -- --    09/27/24 0730 -- 163 36 147/78 103 96 % -- -- -- --    09/27/24 0700 97.5 °F (36.4 °C) 162 37 125/92 104 97 % -- -- Nasal cannula --    09/27/24 0645 -- 157 20 119/89 100 95 % -- -- -- --    09/27/24 0630 -- 150 24 111/85 94 95 % -- -- -- --    09/27/24 0625 -- 147 22 137/99 112 -- -- -- -- --    09/27/24 0600 97.6 °F (36.4 °C) 117 20 141/93 112 98 % 28 2 L/min Nasal cannula --    09/27/24 0555 -- -- -- -- -- -- -- -- -- No Pain    09/27/24 0520 -- 116 18 135/92 -- 95 % 32 3 L/min Nasal cannula --    09/27/24 0450 -- 152 20 123/83 -- 95 % 32 3 L/min Nasal cannula --    09/27/24 0445 -- 157 -- 118/71 -- 95 % 32 3 L/min Nasal cannula --    09/27/24 0440 -- 144 21 117/71 -- 96 % 32 3 L/min Nasal cannula --    09/27/24 0435 -- 156 22 126/95 -- 96 % 32 3 L/min Nasal cannula --    09/27/24 0431 -- 155 -- 134/85 -- -- -- --  -- --    09/27/24 0430 -- 148 20 129/91 -- 92 % 32 3 L/min Nasal cannula --    09/27/24 0428 -- 143 -- 129/91 -- -- -- -- -- --    09/27/24 0425 -- 146 22 131/101 -- 94 % 32 3 L/min Nasal cannula --    09/27/24 0400 -- 140 22 112/83 -- 96 % -- -- None (Room air) --    09/27/24 0349 -- 135 -- 113/79 -- -- -- -- -- --    09/27/24 0345 -- 134 22 113/79 -- 95 % 32 3 L/min Nasal cannula --    09/27/24 0330 -- 141 24 89/70 77 95 % 32 3 L/min Nasal cannula --    09/27/24 0320 -- 145 24 94/66 -- 94 % 32 3 L/min Nasal cannula --    09/27/24 0315 -- 146 26 102/61 -- 94 % 32 3 L/min Nasal cannula --    09/27/24 0300 -- 144 30 116/73 85 93 % 32 3 L/min Nasal cannula --    09/27/24 0245 -- 146 30 109/67 82 93 % 32 3 L/min Nasal cannula --    09/27/24 0232 -- 144 26 99/58 -- 95 % 32 3 L/min Nasal cannula --    09/27/24 0222 -- 162 28 150/63 72 95 % 32 3 L/min Nasal cannula --    09/27/24 0208 -- 171 -- -- -- -- -- -- -- --    09/27/24 0204 97.3 °F (36.3 °C) 77 19 133/84 -- 93 % -- -- None (Room air) --              Pertinent Labs/Diagnostic Test Results:       Radiology:      XR chest 1 view portable   Final Interpretation by Jason Lopez MD (09/27 1000)      Pulmonary vascular congestion.            Resident: Adrian Nolasco I, the attending radiologist, have reviewed the images and agree with the final report above.      Workstation performed: [id number]           Cardiology:    Date/Time: 9/28/2024 10:27 AM   Electrical Cardioversion    Cardioversion basis:  Elective   Elective indications: failure of anti-arrhythmic medications    Pre-procedure rhythm:  Atrial fibrillation   Electrodes:  Pads   Electrodes placed:  Anterior-lateral   Number of attempts:  1   Attempt 1:     Attempt 1 mode:  Synchronous     Attempt 1 waveform:  Biphasic     Attempt 1 shock (Joules):  200     Attempt 1 outcome:  Conversion to normal sinus rhythm   Post-procedure rhythm:  Normal sinus rhythm   Complications: no complications    Patient  tolerance:  Patient tolerated the procedure well with no immediate   complications     ECG 12 lead   Final Result by Sohan Hightower MD (09/27 0802)   Atrial fibrillation with rapid ventricular response   Left axis deviation   ST & T wave abnormality, consider inferior ischemia   ST & T wave abnormality, consider anterolateral ischemia   Abnormal ECG   When compared with ECG of 27-SEP-2024 04:35, (unconfirmed)   No significant change was found   Confirmed by Sohan Hightower ([id number]) on 9/27/2024 8:01:57 AM      ECG 12 lead   Final Result by Sohan Hightower MD (09/27 0802)   Critical Test Result: High HR   Atrial fibrillation with rapid ventricular response   Left axis deviation   T wave abnormality, consider lateral ischemia   Abnormal ECG   When compared with ECG of 27-SEP-2024 02:06, (unconfirmed)   T wave inversion more evident in Anterior leads   Confirmed by Sohan Hightower ([id number]) on 9/27/2024 8:02:17 AM      ECG 12 lead   Final Result by Sohan Hightower MD (09/27 0804)   Critical Test Result: High HR   Atrial fibrillation with rapid ventricular response with premature    ventricular or aberrantly conducted complexes   Left axis deviation   Septal infarct , age undetermined   Inferior infarct , age undetermined   ST & T wave abnormality, consider lateral ischemia   Abnormal ECG   When compared with ECG of 15-MAY-2024 14:50,   Significant changes have occurred   Confirmed by Sohan Hightower ([id number]) on 9/27/2024 8:04:23 AM                Results from last 7 days   Lab Units 09/29/24 0438 09/28/24  0537 09/27/24  0635 09/27/24  0210   WBC Thousand/uL 12.11* 16.01* 12.40* 8.47   HEMOGLOBIN g/dL 13.4 13.7 14.8 14.3   HEMATOCRIT % 40.8 41.7 44.1 44.1   PLATELETS Thousands/uL 304 337 347 344   TOTAL NEUT ABS Thousands/µL  --  14.05*  --  3.91         Results from last 7 days   Lab Units 09/29/24  0438 09/28/24  0537 09/27/24  1554 09/27/24  0635 09/27/24  0210   SODIUM mmol/L 138 139 136 139 137   POTASSIUM mmol/L 4.4 4.7 4.5 4.1 4.1    CHLORIDE mmol/L 103 106 103 105 103   CO2 mmol/L 29 27 23 22 22   ANION GAP mmol/L 6 6 10 12 12   BUN mg/dL 37* 26* 25 24 24   CREATININE mg/dL 1.24 1.11 1.26 1.26 1.34*   EGFR ml/min/1.73sq m 43 49 42 42 39   CALCIUM mg/dL 9.0 9.3 9.5 9.2 8.8   CALCIUM, IONIZED mmol/L  --  1.14  --  1.07*  --    MAGNESIUM mg/dL 2.0 2.0  --  2.0 2.1   PHOSPHORUS mg/dL 3.0 4.0  --  3.5  --      Results from last 7 days   Lab Units 09/28/24  0537 09/27/24  1554 09/27/24  0210   AST U/L 33 42* 46*   ALT U/L 44 53* 58*   ALK PHOS U/L 73 75 91   TOTAL PROTEIN g/dL 6.6 7.0 6.9   ALBUMIN g/dL 3.6 3.9 4.0   TOTAL BILIRUBIN mg/dL 1.17* 1.36* 0.94   BILIRUBIN DIRECT mg/dL  --  0.28*  --      Results from last 7 days   Lab Units 09/29/24  1148 09/29/24  0755 09/28/24  2150 09/28/24  1658 09/28/24  1115 09/28/24  0721 09/27/24  2115 09/27/24  1536 09/27/24  1054 09/27/24  0754   POC GLUCOSE mg/dl 86 85 109 115 117 123 171* 273* 187* 179*     Results from last 7 days   Lab Units 09/29/24  0438 09/28/24  0537 09/27/24  1554 09/27/24  0635 09/27/24  0210   GLUCOSE RANDOM mg/dL 93 126 159* 132 300*         Results from last 7 days   Lab Units 09/27/24  0754   HEMOGLOBIN A1C % 6.3*   EAG mg/dl 134               Results from last 7 days   Lab Units 09/27/24  0412 09/27/24  0210   HS TNI 0HR ng/L  --  39   HS TNI 2HR ng/L 44  --    HSTNI D2 ng/L 5  --          Results from last 7 days   Lab Units 09/27/24  0210   PROTIME seconds 21.7*   INR  1.86*   PTT seconds 30     Results from last 7 days   Lab Units 09/27/24  0635   TSH 3RD GENERATON uIU/mL 2.115         Results from last 7 days   Lab Units 09/28/24  0537 09/27/24  1022 09/27/24  0754 09/27/24  0520 09/27/24  0210   LACTIC ACID mmol/L 0.8 2.3* 3.6* 2.2* 4.2*             Results from last 7 days   Lab Units 09/27/24  0210   BNP pg/mL 854*               Results from last 7 days   Lab Units 09/27/24  0210   LIPASE u/L 31                 Results from last 7 days   Lab Units 09/27/24  0611   CLARITY  UA  Clear   COLOR UA  Yellow   SPEC GRAV UA  1.020   PH UA  6.0   GLUCOSE UA mg/dl Negative   KETONES UA mg/dl Negative   BLOOD UA  Negative   PROTEIN UA mg/dl Negative   NITRITE UA  Negative   BILIRUBIN UA  Negative   UROBILINOGEN UA E.U./dl 0.2   LEUKOCYTES UA  Negative         Results from last 7 days   Lab Units 09/27/24  0229 09/27/24  0210   BLOOD CULTURE  No Growth at 48 hrs. No Growth at 48 hrs.                   Past Medical History:   Diagnosis Date    High cholesterol     Stroke (HCC)      Present on Admission:   (Resolved) Atrial fibrillation with rapid ventricular response (HCC)   (Resolved) Transaminitis   (Resolved) Lactic acidosis   (Resolved) Acute on chronic diastolic heart failure (HCC)   Prediabetes   (Resolved) SIRS (systemic inflammatory response syndrome) (HCC)      Admitting Diagnosis: CHF (congestive heart failure) (HCC) [I50.9]  SOB (shortness of breath) [R06.02]  Atrial fibrillation with RVR ([facility]) [I48.91]  Age/Sex: 74 y.o. female    Network Utilization Review Department  ATTENTION: Please call with any questions or concerns to [phone] and carefully listen to the prompts so that you are directed to the right person. All voicemails are confidential.   For Discharge needs, contact Care Management DC Support Team at [phone] opt. 2  Send all requests for admission clinical reviews, approved or denied determinations and any other requests to dedicated fax number below belonging to the campus where the patient is receiving treatment. List of dedicated fax numbers for the Facilities:  FACILITY NAME UR FAX NUMBER   ADMISSION DENIALS (Administrative/Medical Necessity) [phone]   DISCHARGE SUPPORT TEAM (NETWORK) [phone]   PARENT CHILD HEALTH (Maternity/NICU/Pediatrics) [phone]   [facility] [phone]   [facility] [phone]   [facility] [phone]   [facility]  [phone]   [facility] [phone]   [facility] [phone]   [facility] [phone]   [facility] [phone]   [facility] [phone]   [facility] [phone]   [facility] [phone]   [facility] [phone]

## 2024-09-30 ENCOUNTER — TRANSITIONAL CARE MANAGEMENT (OUTPATIENT)
Age: 74
End: 2024-09-30

## 2024-09-30 DIAGNOSIS — Z71.89 COMPLEX CARE COORDINATION: Primary | ICD-10-CM

## 2024-09-30 LAB
ATRIAL RATE: 51 BPM
ATRIAL RATE: 64 BPM
ATRIAL RATE: 65 BPM
P AXIS: 60 DEGREES
P AXIS: 72 DEGREES
P AXIS: 75 DEGREES
PR INTERVAL: 108 MS
PR INTERVAL: 116 MS
PR INTERVAL: 118 MS
QRS AXIS: -15 DEGREES
QRS AXIS: -18 DEGREES
QRS AXIS: -28 DEGREES
QRSD INTERVAL: 80 MS
QRSD INTERVAL: 82 MS
QRSD INTERVAL: 82 MS
QT INTERVAL: 480 MS
QT INTERVAL: 502 MS
QT INTERVAL: 550 MS
QTC INTERVAL: 495 MS
QTC INTERVAL: 506 MS
QTC INTERVAL: 522 MS
T WAVE AXIS: 233 DEGREES
T WAVE AXIS: 236 DEGREES
T WAVE AXIS: 246 DEGREES
VENTRICULAR RATE: 51 BPM
VENTRICULAR RATE: 64 BPM
VENTRICULAR RATE: 65 BPM

## 2024-09-30 NOTE — UTILIZATION REVIEW
NOTIFICATION OF ADMISSION DISCHARGE   This is a Notification of Discharge from [facility]. Please be advised that this patient has been discharge from our facility. Below you will find the admission and discharge date and time including the patient’s disposition.   UTILIZATION REVIEW CONTACT:  Macy [facility]  Utilization   Network Utilization Review Department  Phone: [phone] x carefully listen to the prompts. All voicemails are confidential.  Email: [email or web address]     ADMISSION INFORMATION  PRESENTATION DATE: 9/27/2024  2:04 AM  OBERVATION ADMISSION DATE: N/A  INPATIENT ADMISSION DATE: 9/27/24  4:36 AM   DISCHARGE DATE: 9/29/2024  1:56 PM   DISPOSITION:Home/Self Care    Network Utilization Review Department  ATTENTION: Please call with any questions or concerns to [phone] and carefully listen to the prompts so that you are directed to the right person. All voicemails are confidential.   For Discharge needs, contact Care Management DC Support Team at [phone] opt. 2  Send all requests for admission clinical reviews, approved or denied determinations and any other requests to dedicated fax number below belonging to the campus where the patient is receiving treatment. List of dedicated fax numbers for the Facilities:  FACILITY NAME UR FAX NUMBER   ADMISSION DENIALS (Administrative/Medical Necessity) [phone]   DISCHARGE SUPPORT TEAM ([facility]) [phone]   PARENT CHILD HEALTH (Maternity/NICU/Pediatrics) [phone]   [facility] [phone]   [facility] [phone]   [facility] [phone]   [facility] [phone]   [facility] [phone]   [facility] [phone]   [facility] [phone]   [facility] [phone]    [facility] [phone]   [facility] [phone]   [facility] [phone]   [facility] [phone]

## 2024-10-01 ENCOUNTER — PATIENT OUTREACH (OUTPATIENT)
Dept: CASE MANAGEMENT | Facility: OTHER | Age: 74
End: 2024-10-01

## 2024-10-01 NOTE — PROGRESS NOTES
HRR referral received and chart review completed. Pt was recently hospitalized 9/27-9/29 with afib RVR. Pt underwent successful cardioversion 9/28 and discharged on amiodarone 200 mg daily.     Cardiology follow up 10/15.    Call placed to [facility] for care management. Left detailed message with call back information.   Wilma Cristina RN CM on care team and note routed to [facility].

## 2024-10-02 LAB
BACTERIA BLD CULT: NORMAL
BACTERIA BLD CULT: NORMAL

## 2024-10-07 ENCOUNTER — PATIENT OUTREACH (OUTPATIENT)
Dept: CASE MANAGEMENT | Facility: HOSPITAL | Age: 74
End: 2024-10-07

## 2024-10-07 NOTE — LETTER
Date: 10/07/24  Dear Radha Dodson,   My name is Wilma; I am a registered nurse care manager working with Dr Estrada's office.  I have not been able to reach you and would like to set a time that I can talk with you over the phone.  My work is to help patients that have complex medical conditions get the care they need. This includes patients who may have been in the hospital or emergency room.    Please call me with any questions you may have. I look forward to speaking with you.  Sincerely,  Wilma Cristina, [email or web address]  [phone]  Outpatient Care Manager

## 2024-10-07 NOTE — PROGRESS NOTES
Chart reviewed. This is an HRR referral. Pt was recently hospitalized 9/27-9/29 with afib RVR. Pt underwent successful cardioversion 9/28 and discharged on amiodarone 200 mg daily. Patient has not had a [facility] apt and does not have one scheduled. Patient does have an apt on 10/15 with Cardiology.    This RNCM attempted to call patient. There was no answer and a message was left with a request for a call back.    Outreach #2 and an UTR letter sent via My Chart and this RNCM removed self from care team.

## 2024-10-14 NOTE — PROGRESS NOTES
Cardiology Follow Up    Radha Postic  1950  [id number]  [facility] [place] [id number]  [address] FLO[address]  [facility] [id number] [phone]    Radha presents for close follow up from her hospitalization with rapid atrial fibrillation.    1. Atrial fibrillation and flutter (HCC)  Assessment & Plan:  Diagnosed with paroxysmal rapid atrial fibrillation on ZIO monitoring ordered as part of a cardioembolic work up after her CVA in May 2024.  Echo in May 2024 showed preserved LVEF with no significant valvular abnormality.  Recurrent rapid atrial fibrillation 9/27/2024 resulting in hospitalization with failed cardioversion x 4. Subsequent amiodarone load with successful cardioversion on 9/28/2024.  Currently on amiodarone 200 mg BID since 9/29/2024.  Also on metoprolol succinate 25 mg daily.  ECG today shows rapid atrial flutter with 2:1 AV conduction at 127 bpm.  She is completely asymptomatic. She is hemodynamically stable and without signs of heart failure.  I reached out to our EP colleagues and she will be arranged for an outpatient atrial fib/flutter ablation on 10/24/2024.  Continue amiodarone 200 mg BID and Xarelto 20 mg daily.  Increase metoprolol succinate to 25 mg BID.  She will continue to monitor HR's at home.  She is looking into the [employer] mobile device for improved home monitoring.  We reviewed urgent s/s to report. She will seek immediate medical attention if any chest discomfort, shortness of breath, lightheadedness, or HR persistently > 130 bpm. She will call the office if HR persistently < 40 bpm.  Will continue to monitor closely.   Orders:  -     POCT ECG  -     metoprolol succinate (TOPROL-XL) 25 mg 24 hr tablet; Take 1 tablet (25 mg total) by mouth 2 (two) times a day  -     Echo follow up/limited w/ contrast if indicated; Future; Expected date: 10/15/2024  2. H/O ischemic right MCA stroke  Assessment & Plan:  Admitted with acute stroke in  May 2024.  Echo unremarkable at the time.  Found to have paroxysmal rapid a fib on ZIO monitor and started on metoprolol and Xarelto 7/16/2024  3. Hypertriglyceridemia  Assessment & Plan:  Lipid panel 5/15/2024: C 180. T 179. H 45. L 99  She has since been started on atorvastatin 40 mg daily.  Goal LDL < 70  She has an order for updated lipid panel.     AUDRA Garcia has a past medical history of right MCA stroke with residual left hemiparesis and facial droop, paroxysmal atrial fibrillation on Xarelto s/p amio load and cardioversion 9/28/2024, pre-diabetes, and hyperlipidemia.    She established with [facility] on 6/17/2024 for evaluation of a possible cardio-embolic source of her recent CVA. She met with Dr. Hightower. Her echocardiogram in May showed an EF of 55% with mild LA enlargement and mild MR. A 2 week ZIO monitor was ordered at her visit which did reveal paroxysmal rapid atrial fibrillation. She was started on Xarelto on 7/16/2024 and referred to cardiac electrophysiology.     She was admitted to [facility] 9/27-9/29/2024 when she presented with acute shortness of breath and found to be in rapid atrial fibrillation with HR in the 180-190's. The ER attempted cardioversion x 4 unsuccessfully. She was given IV Lopressor, dig, and dilt with no response. Ultimately she was started on IV amiodarone bolus and drip. She underwent a successful cardioversion by the critical care team on 9/28/2024 which restored sinus rhythm, and she was discharged home the next day with amiodarone load of 200 mg twice daily and metoprolol succinate 25 mg once daily.     10/14/2024: Radha presents today for close follow up. ECG today shows atrial flutter with 2:1 AV conduction and ventricular rate of 127 bpm. She is completely asymptomatic today. She has been monitoring her BP and HR every day at home and noticed a change in her HR from 40-60's to 's since Sunday of this week. She denies any chest discomfort, shortness of breath,  lightheadedness, or palpitations. No fluid retention. She is taking her medications faithfully. No missed doses.     Medical Problems       Problem List       H/O ischemic right MCA stroke    Prediabetes    Hydroureteronephrosis    Palpitations    Hypertriglyceridemia    Microhematuria    Paroxysmal atrial fibrillation (HCC)        Past Medical History:   Diagnosis Date    High cholesterol     Stroke (HCC)      Social History     Socioeconomic History    Marital status: Single     Spouse name: Not on file    Number of children: Not on file    Years of education: Not on file    Highest education level: Not on file   Occupational History    Not on file   Tobacco Use    Smoking status: Never    Smokeless tobacco: Never   Vaping Use    Vaping status: Never Used   Substance and Sexual Activity    Alcohol use: Yes     Comment: Social    Drug use: Never    Sexual activity: Not Currently   Other Topics Concern    Not on file   Social History Narrative    Not on file     Social Determinants of Health     Financial Resource Strain: Not on file   Food Insecurity: No Food Insecurity (9/27/2024)    Hunger Vital Sign     Worried About Running Out of Food in the Last Year: Never true     Ran Out of Food in the Last Year: Never true   Transportation Needs: No Transportation Needs (9/27/2024)    PRAPARE - Transportation     Lack of Transportation (Medical): No     Lack of Transportation (Non-Medical): No   Physical Activity: Not on file   Stress: Not on file   Social Connections: Unknown (6/18/2024)    Received from [employer]    Social [employer]     How often do you feel lonely or isolated from those around you? (Adult - for ages 18 years and over): Not on file   Intimate Partner Violence: Not on file   Housing Stability: Low Risk  (9/27/2024)    Housing Stability Vital Sign     Unable to Pay for Housing in the Last Year: No     Number of Times Moved in the Last Year: 0     Homeless in the Last Year: No      Family History    Problem Relation Age of Onset    Atrial fibrillation Mother     Lung cancer Father      Past Surgical History:   Procedure Laterality Date    IR STROKE ALERT  5/15/2024       Current Outpatient Medications:     amiodarone 200 mg tablet, Take 1 tablet (200 mg total) by mouth 2 (two) times a day with meals, Disp: 60 tablet, Rfl: 0    atorvastatin (LIPITOR) 40 mg tablet, Take 1 tablet (40 mg total) by mouth daily with dinner, Disp: 30 tablet, Rfl: 5    metoprolol succinate (TOPROL-XL) 25 mg 24 hr tablet, Take 1 tablet (25 mg total) by mouth 2 (two) times a day, Disp: 180 tablet, Rfl: 3    Multiple Vitamin (multivitamin) tablet, Take 1 tablet by mouth daily, Disp: , Rfl:     NON FORMULARY, in the morning Taking a Calcium Supplement, Disp: , Rfl:     rivaroxaban (Xarelto) 20 mg tablet, Take 1 tablet (20 mg total) by mouth daily with breakfast, Disp: 90 tablet, Rfl: 3  No Known Allergies    Labs:     Chemistry        Component Value Date/Time    K 4.4 09/29/2024 0438     09/29/2024 0438    CO2 29 09/29/2024 0438    BUN 37 (H) 09/29/2024 0438    CREATININE 1.24 09/29/2024 0438        Component Value Date/Time    CALCIUM 9.0 09/29/2024 0438    ALKPHOS 73 09/28/2024 0537    AST 33 09/28/2024 0537    ALT 44 09/28/2024 0537        Lipid panel 5/15/2024: C 180. T 179. H 45. L 99.     Cardiac testing:  ECG 10/15/2024: Atrial flutter with 2:1 AV conduction. Ventricular rate 127 bpm.    ECG 9/29/2024: Sinus rhythm with short RI. ST/T wave abnormality in inferior and anterolateral leads. Qtc 522 ms. Venricular rate 65 bpm.    ECG 9/27/2024: Atrial fibrillation with RVR. LAD. ST/T wave abnormality in inferior and anterolateral leads.    ZIO 6/17-7/1/2024:   Predominantly sinus rhythm, HR , avg 88 bpm.  2 runs of NSVT, longest 5 beats.  27 runs of SVT, longest 16 beats.  17% atrial fibrillation burden with avg  bpm. Longest episode 12 hours and 32 minutes.  6.3% PAC burden. Rare PVC's.     Echo 5/16/2024:  LVEF  55%. Mild LVH. Mild LA dilation. Mild MR.    Review of Systems   Constitutional: Negative.   HENT: Negative.     Cardiovascular:  Negative for chest pain, dyspnea on exertion, irregular heartbeat, leg swelling, near-syncope, orthopnea and palpitations.   Respiratory:  Negative for cough and snoring.    Endocrine: Negative.    Skin: Negative.    Musculoskeletal: Negative.    Gastrointestinal: Negative.    Genitourinary: Negative.    Neurological: Negative.    Psychiatric/Behavioral: Negative.         Vitals:    10/15/24 0953   BP: 158/100   Pulse: (!) 127   Temp: 97.5 °F (36.4 °C)   SpO2: 97%     Vitals:    10/15/24 0953   Weight: 63 kg (139 lb)     Height: 5' (152.4 cm)   Body mass index is 27.15 kg/m².    Physical Exam  Vitals and nursing note reviewed.   Constitutional:       General: She is not in acute distress.     Appearance: She is well-developed. She is not diaphoretic.   HENT:      Head: Normocephalic and atraumatic.   Neck:      Vascular: No JVD.   Cardiovascular:      Rate and Rhythm: Regular rhythm. Tachycardia present.      Pulses: Intact distal pulses.           Radial pulses are 2+ on the right side and 2+ on the left side.      Heart sounds: Normal heart sounds, S1 normal and S2 normal. No murmur heard.     Comments: No edema  Pulmonary:      Effort: Pulmonary effort is normal.      Breath sounds: Normal breath sounds.   Abdominal:      General: There is no distension.      Palpations: Abdomen is soft.      Tenderness: There is no abdominal tenderness.   Musculoskeletal:         General: Normal range of motion.      Cervical back: Normal range of motion and neck supple.   Skin:     General: Skin is warm and dry.   Neurological:      Mental Status: She is alert and oriented to person, place, and time.      Cranial Nerves: No cranial nerve deficit.   Psychiatric:         Mood and Affect: Mood and affect normal.         Behavior: Behavior normal.

## 2024-10-15 ENCOUNTER — TELEPHONE (OUTPATIENT)
Dept: CARDIOLOGY CLINIC | Facility: CLINIC | Age: 74
End: 2024-10-15

## 2024-10-15 ENCOUNTER — OFFICE VISIT (OUTPATIENT)
Dept: CARDIOLOGY CLINIC | Facility: CLINIC | Age: 74
End: 2024-10-15
Payer: COMMERCIAL

## 2024-10-15 ENCOUNTER — PREP FOR PROCEDURE (OUTPATIENT)
Dept: CARDIOLOGY CLINIC | Facility: CLINIC | Age: 74
End: 2024-10-15

## 2024-10-15 VITALS
SYSTOLIC BLOOD PRESSURE: 158 MMHG | OXYGEN SATURATION: 97 % | BODY MASS INDEX: 27.29 KG/M2 | HEIGHT: 60 IN | DIASTOLIC BLOOD PRESSURE: 100 MMHG | WEIGHT: 139 LBS | TEMPERATURE: 97.5 F | HEART RATE: 127 BPM

## 2024-10-15 DIAGNOSIS — I48.92 ATRIAL FIBRILLATION AND FLUTTER (HCC): Primary | ICD-10-CM

## 2024-10-15 DIAGNOSIS — I48.91 ATRIAL FIBRILLATION AND FLUTTER (HCC): Primary | ICD-10-CM

## 2024-10-15 DIAGNOSIS — E78.1 HYPERTRIGLYCERIDEMIA: ICD-10-CM

## 2024-10-15 DIAGNOSIS — Z86.73 H/O ISCHEMIC RIGHT MCA STROKE: ICD-10-CM

## 2024-10-15 PROCEDURE — 99214 OFFICE O/P EST MOD 30 MIN: CPT | Performed by: NURSE PRACTITIONER

## 2024-10-15 PROCEDURE — 93000 ELECTROCARDIOGRAM COMPLETE: CPT | Performed by: NURSE PRACTITIONER

## 2024-10-15 RX ORDER — METOPROLOL SUCCINATE 25 MG/1
25 TABLET, EXTENDED RELEASE ORAL 2 TIMES DAILY
Qty: 180 TABLET | Refills: 3 | Status: SHIPPED | OUTPATIENT
Start: 2024-10-15 | End: 2024-10-17

## 2024-10-15 NOTE — TELEPHONE ENCOUNTER
"Patient scheduled for MAURA/AFIB PFA/AFLUTTER Ablation device on 10/24/24 at [facility] with Dr. Lacy.      Instructions sent to patient through [employer].      Patient aware of all general instructions.    Medication holds:   N/A    Blood Thinners:  Xarelto - Do not take morning of procedure.    Labs to be done on:  N/A  (Patient did BMP / CBC on 9/29/24)    MAURA ordered/completed.    Thank you,  Mary \"Monika\" Curtis    "

## 2024-10-15 NOTE — ASSESSMENT & PLAN NOTE
Diagnosed with paroxysmal rapid atrial fibrillation on ZIO monitoring ordered as part of a cardioembolic work up after her CVA in May 2024.  Echo in May 2024 showed preserved LVEF with no significant valvular abnormality.  Recurrent rapid atrial fibrillation 9/27/2024 resulting in hospitalization with failed cardioversion x 4. Subsequent amiodarone load with successful cardioversion on 9/28/2024.  Currently on amiodarone 200 mg BID since 9/29/2024.  Also on metoprolol succinate 25 mg daily.  ECG today shows rapid atrial flutter with 2:1 AV conduction at 127 bpm.  She is completely asymptomatic. She is hemodynamically stable and without signs of heart failure.  I reached out to our EP colleagues and she will be arranged for an outpatient atrial fib/flutter ablation on 10/24/2024.  Continue amiodarone 200 mg BID and Xarelto 20 mg daily.  Increase metoprolol succinate to 25 mg BID.  She will continue to monitor HR's at home.  She is looking into the [employer] mobile device for improved home monitoring.  We reviewed urgent s/s to report. She will seek immediate medical attention if any chest discomfort, shortness of breath, lightheadedness, or HR persistently > 130 bpm. She will call the office if HR persistently < 40 bpm.  Will continue to monitor closely.

## 2024-10-15 NOTE — ASSESSMENT & PLAN NOTE
Lipid panel 5/15/2024: C 180. T 179. H 45. L 99  She has since been started on atorvastatin 40 mg daily.  Goal LDL < 70  She has an order for updated lipid panel.

## 2024-10-15 NOTE — PATIENT INSTRUCTIONS
You are now in a rapid atrial flutter which is a similar irregular fast heartbeat as atrial fibrillation.  Increase metoprolol succinate to 25 mg twice daily.  I am reaching out to our electrophysiologist now for their guidance and will call you with their recommendation.   If any shortness of breath, chest discomfort, or lightheadedness proceed to the ER immediately.

## 2024-10-15 NOTE — TELEPHONE ENCOUNTER
"Left voicemail on machine informing patient to call and schedule a MAURA/AFIB PFA/AFLUTTER ABLATION procedure.     Irena will let us know if Loop Implant will be added but wants to get auth for it just in case.     PEC: Please start auth process for MAURA/AFIB PFA/AFLUTTER ABL/LOOP IMP w/ on 10/24/24 @ [facility].    Prep for Procedure done.     Thanks,  Mary \"Monika\" Curtis    "

## 2024-10-15 NOTE — TELEPHONE ENCOUNTER
----- Message from Irena Dickerson PA-C sent at 10/15/2024  3:48 PM EDT -----  Regarding: Afib/flutter ablation - Dr. Lacy for 10/24  Miles Frank,    This is a patient that I am currently working on getting scheduled with Dr. Lacy on Thursday 10/24.    This would be for MAURA/atrial fibrillation/flutter ablation with [facility] and [facility].    She will need Xarelto held morning of procedure.    I will also need to call her little bit later on this week once Cora Alejandra lets her know that we are trying to arrange this as an outpatient that I think she would benefit from a [employer] loop recorder implantation.  So we can also prior Auth that and I will confirm if patient is in agreement with that later this week (I know you are off so I can let Rosario or Nikky know the final decision on the loop recorder).    Thank you!  Irena   she would benefit from a [employer] loop recorder implantation.  So we can also prior Auth that and I will confirm if patient is in agreement with that later this week (I know you are off so I can let Rosario or Nikky know the final decision on the loop recorder).    Thank you!  Irena

## 2024-10-16 LAB — COLOGUARD RESULT REPORTABLE: POSITIVE

## 2024-10-17 ENCOUNTER — TELEPHONE (OUTPATIENT)
Dept: CARDIOLOGY CLINIC | Facility: CLINIC | Age: 74
End: 2024-10-17

## 2024-10-17 ENCOUNTER — HOSPITAL ENCOUNTER (OUTPATIENT)
Dept: NON INVASIVE DIAGNOSTICS | Facility: HOSPITAL | Age: 74
Discharge: HOME | End: 2024-10-17
Payer: COMMERCIAL

## 2024-10-17 VITALS — HEART RATE: 146 BPM | WEIGHT: 139 LBS | BODY MASS INDEX: 27.29 KG/M2 | HEIGHT: 60 IN

## 2024-10-17 DIAGNOSIS — I48.91 ATRIAL FIBRILLATION AND FLUTTER (HCC): ICD-10-CM

## 2024-10-17 DIAGNOSIS — I48.92 ATRIAL FIBRILLATION AND FLUTTER (HCC): ICD-10-CM

## 2024-10-17 LAB
AORTIC ROOT: 2.6 CM
BSA FOR ECHO PROCEDURE: 1.6 M2
FRACTIONAL SHORTENING: 29 (ref 28–44)
INTERVENTRICULAR SEPTUM IN DIASTOLE (PARASTERNAL SHORT AXIS VIEW): 1.6 CM
INTERVENTRICULAR SEPTUM: 1.6 CM (ref 0.6–1.1)
LAAS-AP2: 19.7 CM2
LAAS-AP4: 18.7 CM2
LEFT ATRIUM SIZE: 5 CM
LEFT ATRIUM VOLUME (MOD BIPLANE): 65 ML
LEFT ATRIUM VOLUME INDEX (MOD BIPLANE): 40.6 ML/M2
LEFT INTERNAL DIMENSION IN SYSTOLE: 2.2 CM (ref 2.1–4)
LEFT VENTRICULAR INTERNAL DIMENSION IN DIASTOLE: 3.1 CM (ref 3.5–6)
LEFT VENTRICULAR POSTERIOR WALL IN END DIASTOLE: 1.4 CM
LEFT VENTRICULAR STROKE VOLUME: 21 ML
LVSV (TEICH): 21 ML
RIGHT ATRIUM AREA SYSTOLE A4C: 12.9 CM2
RIGHT VENTRICLE ID DIMENSION: 2.7 CM
SL CV LEFT ATRIUM LENGTH A2C: 5 CM
SL CV PED ECHO LEFT VENTRICLE DIASTOLIC VOLUME (MOD BIPLANE) 2D: 37 ML
SL CV PED ECHO LEFT VENTRICLE SYSTOLIC VOLUME (MOD BIPLANE) 2D: 16 ML

## 2024-10-17 PROCEDURE — 93308 TTE F-UP OR LMTD: CPT

## 2024-10-17 RX ORDER — METOPROLOL SUCCINATE 25 MG/1
50 TABLET, EXTENDED RELEASE ORAL 2 TIMES DAILY
Qty: 180 TABLET | Refills: 3 | Status: SHIPPED | OUTPATIENT
Start: 2024-10-17 | End: 2024-10-27

## 2024-10-17 NOTE — TELEPHONE ENCOUNTER
States that her pulse was 46 right after taking her metoprolol. States that the rest of the day it was in the 60-80 area.

## 2024-10-17 NOTE — PROGRESS NOTES
Patient to discuss ablation as we have been coordinating with [facility] cardiology getting her in for further treatment of her atrial fibrillation.  She is scheduled for MAURA/A-fib/flutter ablation with potential for loop recorder implantation 10/24.    We discussed the ablation procedure and the fact that it is a treatment, not a cure.  Postop restrictions were discussed and as she is still working at [employer], advised her that we would likely have her off for the weekend after her ablation but we can write her a note for that when she comes in.    She does have transportation issues and will try to get her a better estimate of when she would need to come in on Thursday so that she can coordinate transportation to [place].  Advised her that it would likely be best to keep her overnight as we will have to also arrange for ride home for her (cannot order Lyft the same day that she is given anesthesia).      We discussed the utility of loop recorder in her situation as she reports that whether her heart rate be in the 40s or 140s she feels no different.  This would allow us to continuously monitor her and she would not need to wear a monitor such as a ZIO that she has in the past.  She will do more research on that and she will let us know definitively if she would like to proceed with this on the day of her procedure.    She is aware to hold her Xarelto the morning of the procedure.    All of her questions were answered to the best of my ability and she was given our office is number in case she has any other questions in the meantime.

## 2024-10-17 NOTE — TELEPHONE ENCOUNTER
Spoke with Radha. She is at the hospital for her echo and HR is in the 140s. She is completely asymptomatic. She states she just took her metoprolol before she came. I asked her to increase the metoprolol to 50 mg twice daily. She will call the office later this afternoon with an update.  If HR is staying > 130 or if she starts to have any symptoms she is aware to come to the ER.

## 2024-10-17 NOTE — TELEPHONE ENCOUNTER
----- Message from ERAN Berry sent at 10/17/2024 12:56 PM EDT -----  Please let Radha know her echo shows likely normal heart function, no significant changes. How are heart rates this afternoon? Thank you!

## 2024-10-18 ENCOUNTER — TELEPHONE (OUTPATIENT)
Dept: FAMILY MEDICINE CLINIC | Facility: CLINIC | Age: 74
End: 2024-10-18

## 2024-10-18 DIAGNOSIS — R19.5 POSITIVE COLORECTAL CANCER SCREENING USING COLOGUARD TEST: Primary | ICD-10-CM

## 2024-10-18 NOTE — TELEPHONE ENCOUNTER
----- Message from Ketan Mahmood MD sent at 10/18/2024  3:14 PM EDT -----  Please call the patient regarding her abnormal result.  Unfortunately the stool screen is positive and will need to follow-up with colonoscopy

## 2024-10-18 NOTE — TELEPHONE ENCOUNTER
LMOM requesting call back to notify of positive screening, need for follow up colonoscopy and referral being placed.

## 2024-10-21 NOTE — TELEPHONE ENCOUNTER
"I called patient to follow up if she will be getting LOOP Recorder Implant and transportation.     Per patient she has decided to get the LOOP Recorder Implant and she has arranged for transportation to and from hospital as well.     Informed patient I have obtained the auth for ablation and loop recorder. Patient verbalized understanding.       Fwd to Irena /  as FYI.     Thanks,  Mary \"Monika\" Curtis     "

## 2024-10-24 ENCOUNTER — APPOINTMENT (OUTPATIENT)
Dept: NON INVASIVE DIAGNOSTICS | Facility: HOSPITAL | Age: 74
DRG: 274 | End: 2024-10-24
Attending: INTERNAL MEDICINE
Payer: COMMERCIAL

## 2024-10-24 ENCOUNTER — ANESTHESIA (OUTPATIENT)
Dept: NON INVASIVE DIAGNOSTICS | Facility: HOSPITAL | Age: 74
DRG: 274 | End: 2024-10-24
Payer: COMMERCIAL

## 2024-10-24 ENCOUNTER — HOSPITAL ENCOUNTER (INPATIENT)
Facility: HOSPITAL | Age: 74
LOS: 2 days | Discharge: HOME | DRG: 274 | End: 2024-10-27
Attending: INTERNAL MEDICINE | Admitting: INTERNAL MEDICINE
Payer: COMMERCIAL

## 2024-10-24 DIAGNOSIS — I48.91 ATRIAL FIBRILLATION AND FLUTTER (HCC): ICD-10-CM

## 2024-10-24 DIAGNOSIS — I48.92 ATRIAL FIBRILLATION AND FLUTTER (HCC): ICD-10-CM

## 2024-10-24 DIAGNOSIS — N17.9 ACUTE KIDNEY INJURY (HCC): Primary | ICD-10-CM

## 2024-10-24 LAB
ANION GAP SERPL CALCULATED.3IONS-SCNC: 7 MMOL/L (ref 4–13)
ATRIAL RATE: 114 BPM
ATRIAL RATE: 46 BPM
BASOPHILS # BLD AUTO: 0.05 THOUSANDS/ΜL (ref 0–0.1)
BASOPHILS NFR BLD AUTO: 1 % (ref 0–1)
BUN SERPL-MCNC: 18 MG/DL (ref 5–25)
CALCIUM SERPL-MCNC: 9.6 MG/DL (ref 8.4–10.2)
CHLORIDE SERPL-SCNC: 102 MMOL/L (ref 96–108)
CO2 SERPL-SCNC: 30 MMOL/L (ref 21–32)
CREAT SERPL-MCNC: 1.23 MG/DL (ref 0.6–1.3)
EOSINOPHIL # BLD AUTO: 0.16 THOUSAND/ΜL (ref 0–0.61)
EOSINOPHIL NFR BLD AUTO: 3 % (ref 0–6)
ERYTHROCYTE [DISTWIDTH] IN BLOOD BY AUTOMATED COUNT: 12.8 % (ref 11.6–15.1)
GFR SERPL CREATININE-BSD FRML MDRD: 43 ML/MIN/1.73SQ M
GLUCOSE P FAST SERPL-MCNC: 112 MG/DL (ref 65–99)
GLUCOSE SERPL-MCNC: 112 MG/DL (ref 65–140)
HCT VFR BLD AUTO: 43.1 % (ref 34.8–46.1)
HGB BLD-MCNC: 14.2 G/DL (ref 11.5–15.4)
IMM GRANULOCYTES # BLD AUTO: 0.01 THOUSAND/UL (ref 0–0.2)
IMM GRANULOCYTES NFR BLD AUTO: 0 % (ref 0–2)
INR PPP: 1.49 (ref 0.85–1.19)
KCT BLD-ACNC: 347 SEC (ref 89–137)
KCT BLD-ACNC: 402 SEC (ref 89–137)
LYMPHOCYTES # BLD AUTO: 1.48 THOUSANDS/ΜL (ref 0.6–4.47)
LYMPHOCYTES NFR BLD AUTO: 29 % (ref 14–44)
MCH RBC QN AUTO: 30.1 PG (ref 26.8–34.3)
MCHC RBC AUTO-ENTMCNC: 32.9 G/DL (ref 31.4–37.4)
MCV RBC AUTO: 92 FL (ref 82–98)
MONOCYTES # BLD AUTO: 0.74 THOUSAND/ΜL (ref 0.17–1.22)
MONOCYTES NFR BLD AUTO: 14 % (ref 4–12)
NEUTROPHILS # BLD AUTO: 2.72 THOUSANDS/ΜL (ref 1.85–7.62)
NEUTS SEG NFR BLD AUTO: 53 % (ref 43–75)
NRBC BLD AUTO-RTO: 0 /100 WBCS
P AXIS: 69 DEGREES
P AXIS: 71 DEGREES
PLATELET # BLD AUTO: 345 THOUSANDS/UL (ref 149–390)
PMV BLD AUTO: 10.1 FL (ref 8.9–12.7)
POTASSIUM SERPL-SCNC: 4.2 MMOL/L (ref 3.5–5.3)
PR INTERVAL: 130 MS
PR INTERVAL: 258 MS
PROTHROMBIN TIME: 18.3 SECONDS (ref 12.3–15)
QRS AXIS: -28 DEGREES
QRS AXIS: -8 DEGREES
QRSD INTERVAL: 82 MS
QRSD INTERVAL: 86 MS
QT INTERVAL: 328 MS
QT INTERVAL: 546 MS
QTC INTERVAL: 452 MS
QTC INTERVAL: 477 MS
RBC # BLD AUTO: 4.71 MILLION/UL (ref 3.81–5.12)
SL CV LV EF: 35
SODIUM SERPL-SCNC: 139 MMOL/L (ref 135–147)
SPECIMEN SOURCE: ABNORMAL
SPECIMEN SOURCE: ABNORMAL
T WAVE AXIS: 260 DEGREES
T WAVE AXIS: 62 DEGREES
VENTRICULAR RATE: 114 BPM
VENTRICULAR RATE: 46 BPM
WBC # BLD AUTO: 5.16 THOUSAND/UL (ref 4.31–10.16)

## 2024-10-24 PROCEDURE — C1766 INTRO/SHEATH,STRBLE,NON-PEEL: HCPCS | Performed by: INTERNAL MEDICINE

## 2024-10-24 PROCEDURE — 0JH602Z INSERTION OF MONITORING DEVICE INTO CHEST SUBCUTANEOUS TISSUE AND FASCIA, OPEN APPROACH: ICD-10-PCS | Performed by: INTERNAL MEDICINE

## 2024-10-24 PROCEDURE — 33285 INSJ SUBQ CAR RHYTHM MNTR: CPT | Performed by: INTERNAL MEDICINE

## 2024-10-24 PROCEDURE — 85610 PROTHROMBIN TIME: CPT | Performed by: PHYSICIAN ASSISTANT

## 2024-10-24 PROCEDURE — C1894 INTRO/SHEATH, NON-LASER: HCPCS | Performed by: INTERNAL MEDICINE

## 2024-10-24 PROCEDURE — 93005 ELECTROCARDIOGRAM TRACING: CPT

## 2024-10-24 PROCEDURE — 76937 US GUIDE VASCULAR ACCESS: CPT | Performed by: INTERNAL MEDICINE

## 2024-10-24 PROCEDURE — 93325 DOPPLER ECHO COLOR FLOW MAPG: CPT | Performed by: INTERNAL MEDICINE

## 2024-10-24 PROCEDURE — 93656 COMPRE EP EVAL ABLTJ ATR FIB: CPT | Performed by: INTERNAL MEDICINE

## 2024-10-24 PROCEDURE — C1764 EVENT RECORDER, CARDIAC: HCPCS | Performed by: INTERNAL MEDICINE

## 2024-10-24 PROCEDURE — 93010 ELECTROCARDIOGRAM REPORT: CPT | Performed by: INTERNAL MEDICINE

## 2024-10-24 PROCEDURE — 02583ZZ DESTRUCTION OF CONDUCTION MECHANISM, PERCUTANEOUS APPROACH: ICD-10-PCS | Performed by: INTERNAL MEDICINE

## 2024-10-24 PROCEDURE — 93655 ICAR CATH ABLTJ DSCRT ARRHYT: CPT | Performed by: INTERNAL MEDICINE

## 2024-10-24 PROCEDURE — 93321 DOPPLER ECHO F-UP/LMTD STD: CPT | Performed by: INTERNAL MEDICINE

## 2024-10-24 PROCEDURE — 85025 COMPLETE CBC W/AUTO DIFF WBC: CPT | Performed by: PHYSICIAN ASSISTANT

## 2024-10-24 PROCEDURE — 85347 COAGULATION TIME ACTIVATED: CPT

## 2024-10-24 PROCEDURE — C2630 CATH, EP, COOL-TIP: HCPCS | Performed by: INTERNAL MEDICINE

## 2024-10-24 PROCEDURE — 93657 TX L/R ATRIAL FIB ADDL: CPT | Performed by: INTERNAL MEDICINE

## 2024-10-24 PROCEDURE — C1893 INTRO/SHEATH, FIXED,NON-PEEL: HCPCS | Performed by: INTERNAL MEDICINE

## 2024-10-24 PROCEDURE — C1759 CATH, INTRA ECHOCARDIOGRAPHY: HCPCS | Performed by: INTERNAL MEDICINE

## 2024-10-24 PROCEDURE — 93312 ECHO TRANSESOPHAGEAL: CPT | Performed by: INTERNAL MEDICINE

## 2024-10-24 PROCEDURE — 02K83ZZ MAP CONDUCTION MECHANISM, PERCUTANEOUS APPROACH: ICD-10-PCS | Performed by: INTERNAL MEDICINE

## 2024-10-24 PROCEDURE — C1733 CATH, EP, OTHR THAN COOL-TIP: HCPCS | Performed by: INTERNAL MEDICINE

## 2024-10-24 PROCEDURE — C1769 GUIDE WIRE: HCPCS | Performed by: INTERNAL MEDICINE

## 2024-10-24 PROCEDURE — C1730 CATH, EP, 19 OR FEW ELECT: HCPCS | Performed by: INTERNAL MEDICINE

## 2024-10-24 PROCEDURE — NC001 PR NO CHARGE: Performed by: PHYSICIAN ASSISTANT

## 2024-10-24 PROCEDURE — C1760 CLOSURE DEV, VASC: HCPCS | Performed by: INTERNAL MEDICINE

## 2024-10-24 PROCEDURE — 93312 ECHO TRANSESOPHAGEAL: CPT

## 2024-10-24 PROCEDURE — 80048 BASIC METABOLIC PNL TOTAL CA: CPT | Performed by: PHYSICIAN ASSISTANT

## 2024-10-24 PROCEDURE — C1731 CATH, EP, 20 OR MORE ELEC: HCPCS | Performed by: INTERNAL MEDICINE

## 2024-10-24 DEVICE — DVC VASC CLSR VASCADE MVP 6-12FR: Type: IMPLANTABLE DEVICE | Site: GROIN | Status: FUNCTIONAL

## 2024-10-24 DEVICE — PERCLOSE™ PROSTYLE™ SUTURE-MEDIATED CLOSURE AND REPAIR SYSTEM
Type: IMPLANTABLE DEVICE | Site: GROIN | Status: FUNCTIONAL
Brand: PERCLOSE™ PROSTYLE™

## 2024-10-24 DEVICE — LOOP RECORDER REVEAL LINQ II SYS DEVICE ONLY: Type: IMPLANTABLE DEVICE | Site: CHEST  WALL | Status: FUNCTIONAL

## 2024-10-24 RX ORDER — LIDOCAINE HYDROCHLORIDE 10 MG/ML
INJECTION, SOLUTION EPIDURAL; INFILTRATION; INTRACAUDAL; PERINEURAL CODE/TRAUMA/SEDATION MEDICATION
Status: DISCONTINUED | OUTPATIENT
Start: 2024-10-24 | End: 2024-10-24 | Stop reason: HOSPADM

## 2024-10-24 RX ORDER — GLYCOPYRROLATE 0.2 MG/ML
INJECTION INTRAMUSCULAR; INTRAVENOUS AS NEEDED
Status: DISCONTINUED | OUTPATIENT
Start: 2024-10-24 | End: 2024-10-24

## 2024-10-24 RX ORDER — ONDANSETRON 2 MG/ML
INJECTION INTRAMUSCULAR; INTRAVENOUS AS NEEDED
Status: DISCONTINUED | OUTPATIENT
Start: 2024-10-24 | End: 2024-10-24

## 2024-10-24 RX ORDER — ROCURONIUM BROMIDE 10 MG/ML
INJECTION, SOLUTION INTRAVENOUS AS NEEDED
Status: DISCONTINUED | OUTPATIENT
Start: 2024-10-24 | End: 2024-10-24

## 2024-10-24 RX ORDER — FENTANYL CITRATE 50 UG/ML
INJECTION, SOLUTION INTRAMUSCULAR; INTRAVENOUS AS NEEDED
Status: DISCONTINUED | OUTPATIENT
Start: 2024-10-24 | End: 2024-10-24

## 2024-10-24 RX ORDER — PROTAMINE SULFATE 10 MG/ML
INJECTION, SOLUTION INTRAVENOUS AS NEEDED
Status: DISCONTINUED | OUTPATIENT
Start: 2024-10-24 | End: 2024-10-24

## 2024-10-24 RX ORDER — ACETAMINOPHEN 325 MG/1
650 TABLET ORAL EVERY 4 HOURS PRN
Status: DISCONTINUED | OUTPATIENT
Start: 2024-10-24 | End: 2024-10-27 | Stop reason: HOSPADM

## 2024-10-24 RX ORDER — ATORVASTATIN CALCIUM 40 MG/1
40 TABLET, FILM COATED ORAL
Status: DISCONTINUED | OUTPATIENT
Start: 2024-10-24 | End: 2024-10-27 | Stop reason: HOSPADM

## 2024-10-24 RX ORDER — DEXAMETHASONE SODIUM PHOSPHATE 10 MG/ML
INJECTION, SOLUTION INTRAMUSCULAR; INTRAVENOUS AS NEEDED
Status: DISCONTINUED | OUTPATIENT
Start: 2024-10-24 | End: 2024-10-24

## 2024-10-24 RX ORDER — PROPOFOL 10 MG/ML
INJECTION, EMULSION INTRAVENOUS AS NEEDED
Status: DISCONTINUED | OUTPATIENT
Start: 2024-10-24 | End: 2024-10-24

## 2024-10-24 RX ORDER — SODIUM CHLORIDE 9 MG/ML
INJECTION, SOLUTION INTRAVENOUS CONTINUOUS PRN
Status: DISCONTINUED | OUTPATIENT
Start: 2024-10-24 | End: 2024-10-24

## 2024-10-24 RX ORDER — HEPARIN SODIUM 1000 [USP'U]/ML
INJECTION, SOLUTION INTRAVENOUS; SUBCUTANEOUS CODE/TRAUMA/SEDATION MEDICATION
Status: DISCONTINUED | OUTPATIENT
Start: 2024-10-24 | End: 2024-10-24 | Stop reason: HOSPADM

## 2024-10-24 RX ORDER — DOCUSATE SODIUM 100 MG/1
100 CAPSULE, LIQUID FILLED ORAL 2 TIMES DAILY PRN
Status: DISCONTINUED | OUTPATIENT
Start: 2024-10-24 | End: 2024-10-27 | Stop reason: HOSPADM

## 2024-10-24 RX ORDER — LIDOCAINE HYDROCHLORIDE 10 MG/ML
INJECTION, SOLUTION EPIDURAL; INFILTRATION; INTRACAUDAL; PERINEURAL AS NEEDED
Status: DISCONTINUED | OUTPATIENT
Start: 2024-10-24 | End: 2024-10-24

## 2024-10-24 RX ORDER — HYDROXYZINE HYDROCHLORIDE 50 MG/1
50 TABLET, FILM COATED ORAL EVERY 6 HOURS PRN
Status: DISCONTINUED | OUTPATIENT
Start: 2024-10-24 | End: 2024-10-27 | Stop reason: HOSPADM

## 2024-10-24 RX ORDER — MIDAZOLAM HYDROCHLORIDE 2 MG/2ML
INJECTION, SOLUTION INTRAMUSCULAR; INTRAVENOUS AS NEEDED
Status: DISCONTINUED | OUTPATIENT
Start: 2024-10-24 | End: 2024-10-24

## 2024-10-24 RX ORDER — METOPROLOL SUCCINATE 25 MG/1
25 TABLET, EXTENDED RELEASE ORAL DAILY
Status: DISCONTINUED | OUTPATIENT
Start: 2024-10-24 | End: 2024-10-27 | Stop reason: HOSPADM

## 2024-10-24 RX ORDER — FENTANYL CITRATE/PF 50 MCG/ML
25 SYRINGE (ML) INJECTION
Status: DISCONTINUED | OUTPATIENT
Start: 2024-10-24 | End: 2024-10-24 | Stop reason: HOSPADM

## 2024-10-24 RX ORDER — AMIODARONE HYDROCHLORIDE 200 MG/1
200 TABLET ORAL
Status: DISCONTINUED | OUTPATIENT
Start: 2024-10-25 | End: 2024-10-25

## 2024-10-24 RX ORDER — SODIUM CHLORIDE 9 MG/ML
20 INJECTION, SOLUTION INTRAVENOUS ONCE
Status: COMPLETED | OUTPATIENT
Start: 2024-10-24 | End: 2024-10-24

## 2024-10-24 RX ORDER — LANOLIN ALCOHOL/MO/W.PET/CERES
3 CREAM (GRAM) TOPICAL
Status: DISCONTINUED | OUTPATIENT
Start: 2024-10-24 | End: 2024-10-27 | Stop reason: HOSPADM

## 2024-10-24 RX ORDER — CEFAZOLIN SODIUM 2 G/50ML
SOLUTION INTRAVENOUS AS NEEDED
Status: DISCONTINUED | OUTPATIENT
Start: 2024-10-24 | End: 2024-10-24

## 2024-10-24 RX ADMIN — MIDAZOLAM HYDROCHLORIDE 2 MG: 2 INJECTION, SOLUTION INTRAMUSCULAR; INTRAVENOUS at 14:58

## 2024-10-24 RX ADMIN — PROTAMINE SULFATE 75 MG: 10 INJECTION, SOLUTION INTRAVENOUS at 17:13

## 2024-10-24 RX ADMIN — CEFAZOLIN SODIUM 2000 MG: 2 SOLUTION INTRAVENOUS at 15:27

## 2024-10-24 RX ADMIN — PROPOFOL 100 MG: 10 INJECTION, EMULSION INTRAVENOUS at 15:07

## 2024-10-24 RX ADMIN — ONDANSETRON 4 MG: 2 INJECTION INTRAMUSCULAR; INTRAVENOUS at 17:07

## 2024-10-24 RX ADMIN — FENTANYL CITRATE 50 MCG: 50 INJECTION, SOLUTION INTRAMUSCULAR; INTRAVENOUS at 15:07

## 2024-10-24 RX ADMIN — FENTANYL CITRATE 25 MCG: 50 INJECTION, SOLUTION INTRAMUSCULAR; INTRAVENOUS at 16:11

## 2024-10-24 RX ADMIN — LIDOCAINE HYDROCHLORIDE 50 MG: 10 INJECTION, SOLUTION EPIDURAL; INFILTRATION; INTRACAUDAL; PERINEURAL at 15:07

## 2024-10-24 RX ADMIN — ACETAMINOPHEN 650 MG: 325 TABLET, FILM COATED ORAL at 19:41

## 2024-10-24 RX ADMIN — FENTANYL CITRATE 25 MCG: 50 INJECTION, SOLUTION INTRAMUSCULAR; INTRAVENOUS at 16:35

## 2024-10-24 RX ADMIN — GLYCOPYRROLATE 0.3 MG: 0.2 INJECTION INTRAMUSCULAR; INTRAVENOUS at 16:12

## 2024-10-24 RX ADMIN — ROCURONIUM BROMIDE 70 MG: 10 INJECTION, SOLUTION INTRAVENOUS at 16:34

## 2024-10-24 RX ADMIN — ROCURONIUM BROMIDE 50 MG: 10 INJECTION, SOLUTION INTRAVENOUS at 15:07

## 2024-10-24 RX ADMIN — SODIUM CHLORIDE: 9 INJECTION, SOLUTION INTRAVENOUS at 17:14

## 2024-10-24 RX ADMIN — DEXAMETHASONE SODIUM PHOSPHATE 10 MG: 10 INJECTION, SOLUTION INTRAMUSCULAR; INTRAVENOUS at 15:13

## 2024-10-24 RX ADMIN — SODIUM CHLORIDE 20 ML/HR: 0.9 INJECTION, SOLUTION INTRAVENOUS at 12:34

## 2024-10-24 RX ADMIN — SODIUM CHLORIDE: 9 INJECTION, SOLUTION INTRAVENOUS at 14:52

## 2024-10-24 RX ADMIN — ATORVASTATIN CALCIUM 40 MG: 40 TABLET, FILM COATED ORAL at 19:41

## 2024-10-24 RX ADMIN — RIVAROXABAN 20 MG: 20 TABLET, FILM COATED ORAL at 19:00

## 2024-10-24 NOTE — DISCHARGE INSTR - AVS FIRST PAGE
MEDICATION INSTRUCTIONS/CHANGES:  Please lower amiodarone to 100mg daily.    RESTRICTIONS:   No heavy lifting (more than 5-10 pounds) or strenuous activity for one week.    No soaking in a bath tub/hot tub/swimming pool for one week or until groin heals. You may shower - please let soap and water run over the groins, no scrubbing, and pat the area dry. Please place band-aid on groins daily for up to five days, but you may remove sooner if no issues are noted.     If you notice ongoing bleeding, swelling, or firm lumps in groin near ablation incision, please contact Dr. Lacy's office - [phone]. If you have any significant issues after hours or on the weekends, please call the on call cardiology number at [phone].    LOOP RECORDER INSTRUCTIONS:  It is acceptable to shower with the glue in place. The glue will fall off in 1 week on its own, but we ask please do not scrub the area or swim during the next 14 days. Do not use lotions/powders/creams on incision. Remove outer bandage 48 hours after procedure. Please call the office [phone] if you notice redness, swelling, bleeding, or drainage from incision or if you develop fevers.      Cardiac Loop Recorder Insertion      WHAT YOU SHOULD KNOW:    A cardiac loop recorder is a device used to diagnose heart rhythm problems, such as a fast or irregular heartbeat. It is implanted in your left chest, just under the skin. The device records a pattern of your heart's rhythm, called an EKG. Your device records automatic EKGs, depending on how your caregiver programs it. You may also receive a handheld controller. You press a button on the controller when you have symptoms, such as dizziness, lightheadedness, or palpitations. The device will record an EKG at that moment. The recording can help your caregiver see if your symptoms may be caused by heart rhythm problems. Your caregiver will remove the device after it has collected enough data. You may need the  device for up to 3 years. The procedure to remove the device is similar to the procedure used to implant it.      AFTER YOU LEAVE:    Follow up with your cardiologist as directed: You will need to return in 1 to 2 weeks. Your cardiologist will check your incision. He may also program your device settings again. He will retrieve data from the device every 1 to 3 months with a monitor held over your skin. You may be able to transmit data from your device from home as well. You will do this by calling a number provided by your cardiologist, or as they have instructed you. Ask for information about this process. Write down your questions so you remember to ask them during your visits.      Wound care: Keep loop recorder incision dry for one week. Do not use lotions/powders/creams on incision. Remove outer bandage 48 hours after procedure - leave underlying steri-strips in place, they will either fall off on their own or will be removed at 2 week follow up appointment. Please call the office if you notice redness, swelling, bleeding, or drainage from incision or if you develop fevers. After that first week, carefully wash your incision with soap and water. Keep the area clean and dry until it heals.      Return to activity: If you received anesthesia, you will not be able to drive for 24 hours. Otherwise, most people can return to normal activities soon after the procedure. Your cardiologist may want to know if your work involves electrical current or high-voltage equipment. Ask about other electrical items that could interfere with your cardiac loop recorder.      Contact your cardiologist if:   You have a fever or chills.    Your wound is red, swollen, or draining pus.    You have questions or concerns about your condition or care.    Seek care immediately or call 911 if:   You feel weak, dizzy, or faint.    You lose consciousness.      © 2014 [employer] Information is for End User's use only and may  not be sold, redistributed or otherwise used for commercial purposes. All illustrations and images included in CareNotes® are the copyrighted property of [employer] or [employer].    The above information is an  only. It is not intended as medical advice for individual conditions or treatments. Talk to your doctor, nurse or pharmacist before following any medical regimen to see if it is safe and effective for you.

## 2024-10-24 NOTE — ASSESSMENT & PLAN NOTE
- anticoagulated with Xarelto / [id number] score of 4 (age, sex, CVA)  - EF of 40% per echo 10/2024 / severe dilation of left atrium noted  - rate control: metoprolol  - antiarrhythmic therapy: amiodarone  - prior cardioversion: failed Cvx4 9/27 then successful with amio load 9/28  - prior ablation: none    Patient presents today to undergo ablation of atrial fibrillation/flutter and loop recorder implantation. Will be staying overnight for further monitoring and transportation.

## 2024-10-24 NOTE — ANESTHESIA POSTPROCEDURE EVALUATION
Post-Op Assessment Note    CV Status:  Stable  Pain Score: 0    Pain management: adequate       Mental Status:  Awake and sleepy   Hydration Status:  Stable   PONV Controlled:  None   Airway Patency:  Patent     Post Op Vitals Reviewed: Yes    No anethesia notable event occurred.    Staff: Anesthesiologist, CRNA           Last Filed PACU Vitals:  Vitals Value Taken Time   Temp     Pulse 44 10/24/24 1735   /59    Resp 12    SpO2 100 % 10/24/24 1735   Vitals shown include unfiled device data.    Modified Avery:  Activity: 2 (10/24/2024  2:28 PM)  Respiration: 2 (10/24/2024  2:28 PM)  Circulation: 2 (10/24/2024  2:28 PM)  Consciousness: 2 (10/24/2024  2:28 PM)  Oxygen Saturation: 2 (10/24/2024  2:28 PM)  Modified Avery Score: 10 (10/24/2024  2:28 PM)

## 2024-10-24 NOTE — ANESTHESIA PREPROCEDURE EVALUATION
Procedure:  Cardiac eps/afib ablation PFA (Chest)  Cardiac eps/aflutter ablation (Chest)  Cardiac loop recorder implant (Chest)    Relevant Problems   CARDIO   (+) Atrial fibrillation and flutter (HCC)   (+) Hypertriglyceridemia      /RENAL   (+) Hydroureteronephrosis      Neurology/Sleep   (+) H/O ischemic right MCA stroke     Stroke (HCC)    High cholesterol          Left Ventricle: Left ventricular cavity size is normal. Wall thickness is increased. There is moderate concentric hypertrophy. Systolic function is difficult to assess d/t tachycardia- it appears to be no worse than 40% and is likely normal   Right Ventricle: Systolic function is mildly reduced.   Left Atrium: The atrium is severely dilated.   Right Atrium: The atrium is moderately dilated.   Mitral Valve: There is mild regurgitation.   Tricuspid Valve: There is mild regurgitation. The right ventricular systolic pressure is normal.   Pericardium: There is no pericardial effusion.   Physical Exam    Airway    Mallampati score: II  TM Distance: >3 FB  Neck ROM: full     Dental       Cardiovascular  Cardiovascular exam normal    Pulmonary  Pulmonary exam normal     Other Findings  post-pubertal.      Anesthesia Plan  ASA Score- 3     Anesthesia Type- general with ASA Monitors.         Additional Monitors:     Airway Plan: ETT.           Plan Factors-Exercise tolerance (METS): >4 METS.    Chart reviewed. EKG reviewed. Imaging results reviewed. Existing labs reviewed. Patient summary reviewed.                  Induction- intravenous.    Postoperative Plan- Plan for postoperative opioid use. Planned trial extubation    Perioperative Resuscitation Plan - Level 1 - Full Code.       Informed Consent- Anesthetic plan and risks discussed with patient.  I personally reviewed this patient with the CRNA. Discussed and agreed on the Anesthesia Plan with the CRNA..

## 2024-10-24 NOTE — H&P
H&P Exam - Electrophysiology  Radha Dodson 74 y.o. female MRN: [id number]  Unit/Bed#: BE CATH LAB ROOM Encounter: [id number]    Assessment & Plan     Assessment & Plan  Atrial fibrillation and flutter (HCC)  - anticoagulated with Xarelto / [id number] score of 4 (age, sex, CVA)  - EF of 40% per echo 10/2024 / severe dilation of left atrium noted  - rate control: metoprolol  - antiarrhythmic therapy: amiodarone  - prior cardioversion: failed Cvx4 9/27 then successful with amio load 9/28  - prior ablation: none    Patient presents today to undergo ablation of atrial fibrillation/flutter and loop recorder implantation. Will be staying overnight for further monitoring and transportation.   H/O ischemic right MCA stroke  - stroke 5/2024  - follow up Zio monitor showed atrial fibrillation and started on Xarelto      History of Present Illness   HPI:  Radha Dodson is a 74 y.o. year old female with likely persistent atrial fibrillation/flutter anticoagulated with Xarelto, and prior ischemic right MCA stroke.    Patient became established with cardiology after suffering stroke in May 2024.  This felt that she required ZIO monitor and potential loop recorder implantation to monitor for atrial fibrillation as a cause of this stroke.  Atrial fibrillation with RVR was picked up on Zio monitor with burden of 17% after 2 weeks.  She had been placed on Xarelto but presented on 9/27 due to symptomatic RVR and heart failure.  She was attempted to be shocked 4 times in the emergency room that was unsuccessful.  Therefore, she was admitted and placed on IV amiodarone and cardioverted again which was successful.  Unfortunately in follow-up she was found to be in flutter.  Outpatient echo showed EF of 40%.  Electrophysiology was reached out to and outpatient ablation was set up.  I did call her on 10/17 to go over the ablation procedure along with loop recorder implantation and she was agreeable.    She reports that even today she is  asymptomatic with no palpitations, shortness of breath, or lower extremity swelling.    EKG: atrial flutter    Review of Systems  ROS as noted above, otherwise 12 point review of systems was performed and is negative.     Historical Information   Past Medical History:   Diagnosis Date    High cholesterol     Stroke (HCC)      Past Surgical History:   Procedure Laterality Date    IR STROKE ALERT  5/15/2024     Family History:   Family History   Problem Relation Age of Onset    Atrial fibrillation Mother     Lung cancer Father        Social History   Social History     Substance and Sexual Activity   Alcohol Use Yes    Comment: Social     Social History     Substance and Sexual Activity   Drug Use Never     Social History     Tobacco Use   Smoking Status Never   Smokeless Tobacco Never       Meds/Allergies   all medications and allergies reviewed  Home Medications:   Medications Prior to Admission:     amiodarone 200 mg tablet    atorvastatin (LIPITOR) 40 mg tablet    metoprolol succinate (TOPROL-XL) 25 mg 24 hr tablet    Multiple Vitamin (multivitamin) tablet    NON FORMULARY    rivaroxaban (Xarelto) 20 mg tablet    No Known Allergies    Objective   Vitals: Blood pressure (!) 171/116, pulse (!) 114, temperature 98.8 °F (37.1 °C), temperature source Temporal, resp. rate 16, height 5' (1.524 m), weight 63 kg (139 lb), SpO2 98%.  Orthostatic Blood Pressures      Flowsheet Row Most Recent Value   Blood Pressure 171/116 filed at 10/24/2024 1234   Patient Position - Orthostatic VS Lying filed at 10/24/2024 1234            No intake or output data in the 24 hours ending 10/24/24 1348    Invasive Devices       Peripheral Intravenous Line  Duration             Peripheral IV 10/24/24 Left Antecubital <1 day    Peripheral IV 10/24/24 Right Antecubital <1 day                    Physical Exam   GEN: NAD, alert and oriented, well appearing  SKIN: dry without significant lesions or rashes  HEENT: NCAT, PERRL, EOMs intact  NECK: No  JVD appreciated  CARDIOVASCULAR: tachycardia, regular  LUNGS: Good respiratory effort with no audible wheezes  PSYCH: Normal mood and affect  NEURO: CN ll-Xll grossly intact      Lab Results: I have personally reviewed pertinent lab results.    Results from last 7 days   Lab Units 10/24/24  1230   WBC Thousand/uL 5.16   HEMOGLOBIN g/dL 14.2   HEMATOCRIT % 43.1   PLATELETS Thousands/uL 345     Results from last 7 days   Lab Units 10/24/24  1230   POTASSIUM mmol/L 4.2   CHLORIDE mmol/L 102   CO2 mmol/L 30   BUN mg/dL 18   CREATININE mg/dL 1.23   CALCIUM mg/dL 9.6     Results from last 7 days   Lab Units 10/24/24  1230   INR  1.49*             Imaging: Results Review Statement: No pertinent imaging studies reviewed.  No results found for this or any previous visit.      Code Status: Level 1 - Full Code    ** Please Note: Dictation voice to text software may have been used in the creation of this document. **

## 2024-10-25 PROBLEM — N17.9 ACUTE KIDNEY INJURY (HCC): Status: ACTIVE | Noted: 2024-10-25

## 2024-10-25 PROBLEM — Z95.818 IMPLANTABLE LOOP RECORDER PRESENT: Status: ACTIVE | Noted: 2024-10-25

## 2024-10-25 LAB
ANION GAP SERPL CALCULATED.3IONS-SCNC: 10 MMOL/L (ref 4–13)
ANION GAP SERPL CALCULATED.3IONS-SCNC: 9 MMOL/L (ref 4–13)
BUN SERPL-MCNC: 33 MG/DL (ref 5–25)
BUN SERPL-MCNC: 36 MG/DL (ref 5–25)
CALCIUM SERPL-MCNC: 7.7 MG/DL (ref 8.4–10.2)
CALCIUM SERPL-MCNC: 8.4 MG/DL (ref 8.4–10.2)
CHLORIDE SERPL-SCNC: 103 MMOL/L (ref 96–108)
CHLORIDE SERPL-SCNC: 105 MMOL/L (ref 96–108)
CO2 SERPL-SCNC: 20 MMOL/L (ref 21–32)
CO2 SERPL-SCNC: 22 MMOL/L (ref 21–32)
CREAT SERPL-MCNC: 2.12 MG/DL (ref 0.6–1.3)
CREAT SERPL-MCNC: 2.15 MG/DL (ref 0.6–1.3)
ERYTHROCYTE [DISTWIDTH] IN BLOOD BY AUTOMATED COUNT: 14.1 % (ref 11.6–15.1)
GFR SERPL CREATININE-BSD FRML MDRD: 22 ML/MIN/1.73SQ M
GFR SERPL CREATININE-BSD FRML MDRD: 22 ML/MIN/1.73SQ M
GLUCOSE SERPL-MCNC: 183 MG/DL (ref 65–140)
GLUCOSE SERPL-MCNC: 232 MG/DL (ref 65–140)
HCT VFR BLD AUTO: 38.1 % (ref 34.8–46.1)
HGB BLD-MCNC: 12.5 G/DL (ref 11.5–15.4)
MCH RBC QN AUTO: 31.3 PG (ref 26.8–34.3)
MCHC RBC AUTO-ENTMCNC: 32.8 G/DL (ref 31.4–37.4)
MCV RBC AUTO: 95 FL (ref 82–98)
PLATELET # BLD AUTO: 271 THOUSANDS/UL (ref 149–390)
PMV BLD AUTO: 11.3 FL (ref 8.9–12.7)
POTASSIUM SERPL-SCNC: 4.7 MMOL/L (ref 3.5–5.3)
POTASSIUM SERPL-SCNC: 5 MMOL/L (ref 3.5–5.3)
RBC # BLD AUTO: 4 MILLION/UL (ref 3.81–5.12)
SODIUM SERPL-SCNC: 134 MMOL/L (ref 135–147)
SODIUM SERPL-SCNC: 135 MMOL/L (ref 135–147)
WBC # BLD AUTO: 12.99 THOUSAND/UL (ref 4.31–10.16)

## 2024-10-25 PROCEDURE — NC001 PR NO CHARGE: Performed by: PHYSICIAN ASSISTANT

## 2024-10-25 PROCEDURE — 85027 COMPLETE CBC AUTOMATED: CPT | Performed by: PHYSICIAN ASSISTANT

## 2024-10-25 PROCEDURE — 80048 BASIC METABOLIC PNL TOTAL CA: CPT | Performed by: PHYSICIAN ASSISTANT

## 2024-10-25 RX ORDER — AMIODARONE HYDROCHLORIDE 200 MG/1
100 TABLET ORAL
Status: DISCONTINUED | OUTPATIENT
Start: 2024-10-26 | End: 2024-10-27 | Stop reason: HOSPADM

## 2024-10-25 RX ADMIN — RIVAROXABAN 15 MG: 15 TABLET, FILM COATED ORAL at 18:51

## 2024-10-25 RX ADMIN — SODIUM CHLORIDE 500 ML: 0.9 INJECTION, SOLUTION INTRAVENOUS at 11:16

## 2024-10-25 RX ADMIN — METOPROLOL SUCCINATE 25 MG: 25 TABLET, FILM COATED, EXTENDED RELEASE ORAL at 08:35

## 2024-10-25 RX ADMIN — ATORVASTATIN CALCIUM 40 MG: 40 TABLET, FILM COATED ORAL at 17:21

## 2024-10-25 RX ADMIN — AMIODARONE HYDROCHLORIDE 200 MG: 200 TABLET ORAL at 17:21

## 2024-10-25 NOTE — ASSESSMENT & PLAN NOTE
- anticoagulated with Xarelto / [id number] score of 4 (age, sex, CVA)  - EF of 40% per echo 10/2024 / severe dilation of left atrium noted  - rate control: metoprolol  - antiarrhythmic therapy: amiodarone  - prior cardioversion: failed Cvx4 9/27 then successful with amio load 9/28  - now status post ablation of atrial fibrillation with pulmonary vein isolation and posterior wall isolation using PFA along with CTI line by Dr. Lacy on 10/24/2024    Patient is doing well status post ablation of atrial fibrillation and flutter. She has been in sinus rhythm. Unfortunately did have GEGE and was advised that she would need to stay for IV hydration and to ensure she also does not have urinary retention. No on nephrotoxic meds that can be held. Will recheck BMP in AM.     ADDENDUM: Patient did require straight cath and did have difficult anatomy.  Will reach out to urology just to make them aware in case she still has urinary retention issues overnight.  Will also lower amiodarone to 100 mg daily.  Will also adjust Xarelto to 15 mg for tonight as kidney function is lower today.  Hopefully this can be readjusted back to 20 mg daily tomorrow.

## 2024-10-25 NOTE — PROGRESS NOTES
Progress Note - Electrophysiology   Name: Radha Dodson 74 y.o. female I MRN: [id number]  Unit/Bed#: [facility] 519-01 I Date of Admission: 10/24/2024   Date of Service: 10/25/2024 I Hospital Day: 0     Assessment & Plan  Atrial fibrillation and flutter (HCC)  - anticoagulated with Xarelto / [id number] score of 4 (age, sex, CVA)  - EF of 40% per echo 10/2024 / severe dilation of left atrium noted  - rate control: metoprolol  - antiarrhythmic therapy: amiodarone  - prior cardioversion: failed Cvx4 9/27 then successful with amio load 9/28  - now status post ablation of atrial fibrillation with pulmonary vein isolation and posterior wall isolation using PFA along with CTI line by Dr. Lacy on 10/24/2024    Patient is doing well status post ablation of atrial fibrillation and flutter. She has been in sinus rhythm. Unfortunately did have GEGE and was advised that she would need to stay for IV hydration and to ensure she also does not have urinary retention. No on nephrotoxic meds that can be held. Will recheck BMP in AM.     ADDENDUM: Patient did require straight cath and did have difficult anatomy.  Will reach out to urology just to make them aware in case she still has urinary retention issues overnight.  Will also lower amiodarone to 100 mg daily.  Will also adjust Xarelto to 15 mg for tonight as kidney function is lower today.  Hopefully this can be readjusted back to 20 mg daily tomorrow.  [employer] loop recorder in situ  - implanted 10/24/2024 for further afib surveillance  Acute kidney injury (HCC)  - creatinine of 2.12 today when baseline closer to 1.1-1.3  - not on ACEi/ARB that needs to be held as outpatient  - given bolus of normal saline  H/O ischemic right MCA stroke  - stroke 5/2024  - follow up Zio monitor showed atrial fibrillation and started on Xarelto    Subjective/Objective   Subjective: Patient reports that she feels better than she did yesterday. Offer no complaints of chest pain or heart related. Does  report that she has not peed since before the ablation. Has had urges but not able to go.    She did have some hypotension and bradycardia post procedure that may have contributed to GEGE seen today.    TELE: sinus rhythm      Objective:  Vitals: /71 (BP Location: Right arm)   Pulse 58   Temp 97.9 °F (36.6 °C) (Oral)   Resp 18   Ht 5' (1.524 m)   Wt 69.5 kg (153 lb 3.5 oz)   SpO2 99%   BMI 29.92 kg/m²     Vitals:    10/24/24 1515 10/25/24 1445   Weight: 63 kg (139 lb) 69.5 kg (153 lb 3.5 oz)     Orthostatic Blood Pressures      Flowsheet Row Most Recent Value   Blood Pressure 129/71 filed at 10/25/2024 1523   Patient Position - Orthostatic VS Lying filed at 10/25/2024 1523              Intake/Output Summary (Last 24 hours) at 10/25/2024 1634  Last data filed at 10/25/2024 1036  Gross per 24 hour   Intake 1888 ml   Output 0 ml   Net 1888 ml       Invasive Devices       Peripheral Intravenous Line  Duration             Peripheral IV 10/25/24 Left;Proximal;Ventral (anterior) Forearm <1 day                              Scheduled Meds:  Current Facility-Administered Medications   Medication Dose Route Frequency Provider Last Rate    acetaminophen  650 mg Oral Q4H PRN Irena Soden, PA-C      amiodarone  200 mg Oral Daily With Breakfast Irena Soden, PA-C      atorvastatin  40 mg Oral After Dinner Irena Soden, PA-C      docusate sodium  100 mg Oral BID PRN Irena Soden, PA-C      hydrOXYzine HCL  50 mg Oral Q6H PRN Irena Soden, PA-C      melatonin  3 mg Oral HS PRN Irena Soden, PA-C      metoprolol succinate  25 mg Oral Daily Irena Soden, PA-C      rivaroxaban  20 mg Oral Daily With Dinner Ariel Vides DO       Continuous Infusions:   PRN Meds:.  acetaminophen    docusate sodium    hydrOXYzine HCL    melatonin    Review of Systems:  ROS  ROS as noted above, otherwise 12 point review of systems was performed and is negative.     Physical Exam:   GEN: NAD, alert and oriented, well appearing  SKIN: dry without  significant lesions or rashes  HEENT: NCAT, PERRL, EOMs intact  NECK: No JVD appreciated  CARDIOVASCULAR: regular  LUNGS: Good respiratory effort with no audible wheezes  PSYCH: Normal mood and affect  NEURO: CN ll-Xll grossly intact    Physical Exam              Lab Results: I have personally reviewed pertinent lab results.    Results from last 7 days   Lab Units 10/25/24  0531 10/24/24  1230   WBC Thousand/uL 12.99* 5.16   HEMOGLOBIN g/dL 12.5 14.2   HEMATOCRIT % 38.1 43.1   PLATELETS Thousands/uL 271 345     Results from last 7 days   Lab Units 10/25/24  1024 10/25/24  0531 10/24/24  1230   POTASSIUM mmol/L 5.0 4.7 4.2   CHLORIDE mmol/L 103 105 102   CO2 mmol/L 22 20* 30   BUN mg/dL 36* 33* 18   CREATININE mg/dL 2.12* 2.15* 1.23   CALCIUM mg/dL 8.4 7.7* 9.6     Results from last 7 days   Lab Units 10/24/24  1230   INR  1.49*           Imaging: Results Review Statement: No pertinent imaging studies reviewed.  No results found for this or any previous visit.      VTE Pharmacologic Prophylaxis: VTE covered by:  rivaroxaban, Oral     VTE Mechanical Prophylaxis: sequential compression device

## 2024-10-25 NOTE — PLAN OF CARE
Problem: PAIN - ADULT  Goal: Verbalizes/displays adequate comfort level or baseline comfort level  Description: Interventions:  - Encourage patient to monitor pain and request assistance  - Assess pain using appropriate pain scale  - Administer analgesics based on type and severity of pain and evaluate response  - Implement non-pharmacological measures as appropriate and evaluate response  - Consider cultural and social influences on pain and pain management  - Notify physician/advanced practitioner if interventions unsuccessful or patient reports new pain  Outcome: Progressing     Problem: INFECTION - ADULT  Goal: Absence or prevention of progression during hospitalization  Description: INTERVENTIONS:  - Assess and monitor for signs and symptoms of infection  - Monitor lab/diagnostic results  - Monitor all insertion sites, i.e. indwelling lines, tubes, and drains  - Monitor endotracheal if appropriate and nasal secretions for changes in amount and color  - [place] appropriate cooling/warming therapies per order  - Administer medications as ordered  - Instruct and encourage patient and family to use good hand hygiene technique  - Identify and instruct in appropriate isolation precautions for identified infection/condition  Outcome: Progressing  Goal: Absence of fever/infection during neutropenic period  Description: INTERVENTIONS:  - Monitor WBC    Outcome: Progressing     Problem: SAFETY ADULT  Goal: Patient will remain free of falls  Description: INTERVENTIONS:  - Educate patient/family on patient safety including physical limitations  - Instruct patient to call for assistance with activity   - Consult OT/PT to assist with strengthening/mobility   - Keep Call bell within reach  - Keep bed low and locked with side rails adjusted as appropriate  - Keep care items and personal belongings within reach  - Initiate and maintain comfort rounds  - Make Fall Risk Sign visible to staff  - Offer Toileting every  Hours,  in advance of need  - Initiate/Maintain alarm  - Obtain necessary fall risk management equipment:   - Apply yellow socks and bracelet for high fall risk patients  - Consider moving patient to room near nurses station  Outcome: Progressing  Goal: Maintain or return to baseline ADL function  Description: INTERVENTIONS:  -  Assess patient's ability to carry out ADLs; assess patient's baseline for ADL function and identify physical deficits which impact ability to perform ADLs (bathing, care of mouth/teeth, toileting, grooming, dressing, etc.)  - Assess/evaluate cause of self-care deficits   - Assess range of motion  - Assess patient's mobility; develop plan if impaired  - Assess patient's need for assistive devices and provide as appropriate  - Encourage maximum independence but intervene and supervise when necessary  - Involve family in performance of ADLs  - Assess for home care needs following discharge   - Consider OT consult to assist with ADL evaluation and planning for discharge  - Provide patient education as appropriate  Outcome: Progressing  Goal: Maintains/Returns to pre admission functional level  Description: INTERVENTIONS:  - Perform AM-PAC 6 Click Basic Mobility/ Daily Activity assessment daily.  - Set and communicate daily mobility goal to care team and patient/family/caregiver.   - Collaborate with rehabilitation services on mobility goals if consulted  - Perform Range of Motion  times a day.  - Reposition patient every hours.  - Dangle patient  times a day  - Stand patient  times a day  - Ambulate patient  times a day  - Out of bed to chair  times a day   - Out of bed for meals  times a day  - Out of bed for toileting  - Record patient progress and toleration of activity level   Outcome: Progressing     Problem: DISCHARGE PLANNING  Goal: Discharge to home or other facility with appropriate resources  Description: INTERVENTIONS:  - Identify barriers to discharge w/patient and caregiver  - Arrange for  needed discharge resources and transportation as appropriate  - Identify discharge learning needs (meds, wound care, etc.)  - Arrange for interpretive services to assist at discharge as needed  - Refer to Case Management Department for coordinating discharge planning if the patient needs post-hospital services based on physician/advanced practitioner order or complex needs related to functional status, cognitive ability, or social support system  Outcome: Progressing     Problem: Knowledge Deficit  Goal: Patient/family/caregiver demonstrates understanding of disease process, treatment plan, medications, and discharge instructions  Description: Complete learning assessment and assess knowledge base.  Interventions:  - Provide teaching at level of understanding  - Provide teaching via preferred learning methods  Outcome: Progressing

## 2024-10-25 NOTE — ASSESSMENT & PLAN NOTE
- anticoagulated with Xarelto / [id number] score of 4 (age, sex, CVA)  - EF of 40% per echo 10/2024 / severe dilation of left atrium noted  - rate control: metoprolol  - antiarrhythmic therapy: amiodarone  - prior cardioversion: failed Cvx4 9/27 then successful with amio load 9/28  - now status post ablation of atrial fibrillation with pulmonary vein isolation and posterior wall isolation using PFA along with CTI line by Dr. Lacy on 10/24/2024  - continue amiodarone and anticoagulation   - outpatient follow up   - Stable for discharge

## 2024-10-25 NOTE — ASSESSMENT & PLAN NOTE
- likely in the setting of urinary retention from genital prolapse   - no fritz placed yesterday   - renal function did improve with fluids and voiding  - urology consulted - outpatient follow up with GYN for pessary consideration

## 2024-10-25 NOTE — ANESTHESIA POSTPROCEDURE EVALUATION
Post-Op Assessment Note    Last Filed PACU Vitals:  Vitals Value Taken Time   Temp 98 °F (36.7 °C) 10/24/24 1845   Pulse 47 10/24/24 1919   BP 99/55 10/24/24 1900   Resp 38 10/24/24 1919   SpO2 96 % 10/24/24 1909   Vitals shown include unfiled device data.    Modified Avery:  Activity: 2 (10/24/2024  7:00 PM)  Respiration: 2 (10/24/2024  7:00 PM)  Circulation: 1 (10/24/2024  7:00 PM)  Consciousness: 2 (10/24/2024  7:00 PM)  Oxygen Saturation: 2 (10/24/2024  7:00 PM)  Modified Avery Score: 9 (10/24/2024  7:00 PM)

## 2024-10-25 NOTE — ASSESSMENT & PLAN NOTE
- creatinine of 2.12 today when baseline closer to 1.1-1.3  - not on ACEi/ARB that needs to be held as outpatient  - given bolus of normal saline

## 2024-10-25 NOTE — UTILIZATION REVIEW
Initial Clinical Review    Elective outpatient procedure, converted to inpatient admission for continued GEGE management, lab monitoring, on IV hydration.    Admission: Date/Time/Statement:   Admission Orders (From admission, onward)       Ordered        10/25/24 1448  INPATIENT ADMISSION  Once             Outpatient No Charge Bed  (Outpatient No Charge Bed/Extended Recovery)  Once        Transfer Service: Cardiology    10/24/24 1731                Orders Placed This Encounter   Procedures    INPATIENT ADMISSION     Standing Status:   Standing     Number of Occurrences:   1     Order Specific Question:   Level of Care     Answer:   Med Surg [16]     Order Specific Question:   Estimated length of stay     Answer:   More than 2 Midnights     Order Specific Question:   Certification     Answer:   I certify that inpatient services are medically necessary for this patient for a duration of greater than two midnights. See H&P and MD Progress Notes for additional information about the patient's course of treatment.     ED Arrival Information       Patient not seen in ED                       No chief complaint on file.      Initial Presentation: 74 y.o. female w/ PMH of persistent atrial fibrillation/flutter anticoagulated with Xarelto, and  ischemic right MCA stroke on 05/2024 presented to  [place] from home for elective OP afib/flutter ablation and loop recorder implantation on 10/24. On arrival to [facility], , /116. Asymptomatic. He will be staying overnight for further monitoring s/p ablation of atrial fibrillation/flutter and loop recorder implantation.     Date: 10/25   Day 2:   EP Notes: Pt doing well s/p ablation of atrial fibrillation and flutter. Yesterday. On sinus rhythm. Noted to have GEGE, creatinine 2.15, baseline 1.1-1.3. WBC 12.99. not on ACEi/ARB that needs to be held as outpatient.  given bolus of normal saline. Will recheck BMP in am. Cont amio, metoprolol succinate, statin, xarelto.    ED  Treatment-Medication Administration - No Administrations Displayed (No Start Event Found)       None            Scheduled Medications:  amiodarone, 200 mg, Oral, Daily With Breakfast  atorvastatin, 40 mg, Oral, After Dinner  metoprolol succinate, 25 mg, Oral, Daily  rivaroxaban, 20 mg, Oral, Daily With Dinner  sodium chloride 0.9 % bolus 500 mL IV 10/25 x 1  sodium chloride 0.9 % infusion  Dose: 20 mL/hr  Freq: Once Route: IV  Indications of Use: IV Hydration  Last Dose: 20 mL/hr (10/24/24 1234)  Start: 10/24/24 1145 End: 10/24/24 1234    Continuous IV Infusions:     PRN Meds:  acetaminophen, 650 mg, Oral, Q4H PRN 10/24 x 1  docusate sodium, 100 mg, Oral, BID PRN  hydrOXYzine HCL, 50 mg, Oral, Q6H PRN  melatonin, 3 mg, Oral, HS PRN      ED Triage Vitals [10/24/24 1234]   Temperature Pulse Respirations Blood Pressure SpO2 Pain Score   98.8 °F (37.1 °C) (!) 114 16 (!) 171/116 98 % No Pain     Weight (last 2 days)       Date/Time Weight    10/25/24 1445 69.5 (153.22)    10/24/24 1515 63 (139)    10/24/24 1425 --    Comment rows:    OBSERV: Received to CCL holding area by stretcher [facility] entral transporter for elective study.  Aware of planned procedure and offers no questions.  Spoke with Bambi, DO and informed consent obtained for procedure, all questions answered. at 10/24/24 1425    10/24/24 1234 63 (139)            Vital Signs (last 3 days)       Date/Time Temp Pulse Resp BP MAP (mmHg) SpO2 O2 Flow Rate (L/min) O2 Device Cardiac (WDL) Patient Position - Orthostatic VS [place] Coma Scale Score Pain    10/25/24 15:23:25 97.9 °F (36.6 °C) 58 18 129/71 90 99 % -- -- -- Lying -- --    10/25/24 10:44:39 -- 63 -- 123/71 88 95 % -- -- -- -- -- --    10/25/24 0930 -- -- -- -- -- -- -- -- -- -- 15 No Pain    10/25/24 08:37:14 -- 59 -- 117/66 83 96 % -- -- -- -- -- --    10/25/24 0835 -- 59 -- 117/66 -- -- -- -- -- -- -- --    10/25/24 07:33:08 98.4 °F (36.9 °C) 58 -- 116/65 82 96 % -- -- -- -- -- --    10/25/24 07:32:44 98.4  °F (36.9 °C) 60 -- 116/65 82 96 % -- -- -- -- -- --    10/25/24 02:13:25 97.8 °F (36.6 °C) 56 18 136/78 97 93 % -- None (Room air) -- Lying -- --    10/24/24 2215 97.5 °F (36.4 °C) 56 -- 111/69 83 96 % -- -- -- -- -- --    10/24/24 2100 -- 48 -- 111/63 79 93 % -- -- -- -- -- --    10/24/24 2000 -- 47 -- 99/61 74 96 % -- -- -- -- -- --    10/24/24 1941 -- -- -- -- -- -- -- -- -- -- -- 5    10/24/24 1930 -- -- -- -- -- -- -- None (Room air) -- -- 15 --    10/24/24 1929 97.8 °F (36.6 °C) 46 -- -- -- -- -- -- -- -- -- --    10/24/24 19:28:32 97.8 °F (36.6 °C) 48 18 91/50 64 97 % -- None (Room air) -- Lying -- --    10/24/24 1900 -- 46 20 99/55 74 97 % -- None (Room air) -- -- 15 No Pain    10/24/24 1845 98 °F (36.7 °C) 45 21 97/54 69 96 % -- None (Room air) -- -- -- No Pain    10/24/24 1830 -- 45 21 100/52 75 94 % -- None (Room air) -- -- 15 No Pain    10/24/24 1825 -- 45 21 108/56 75 94 % -- -- -- -- -- --    10/24/24 1815 -- 45 21 88/54 65 94 % -- None (Room air) -- -- 15 No Pain    10/24/24 1800 -- 45 18 89/53 65 94 % -- None (Room air) -- -- 15 No Pain    10/24/24 1745 -- 45 13 88/53 66 94 % -- None (Room air) -- -- 14 No Pain    10/24/24 1735 97.7 °F (36.5 °C) 45 21 107/59 76 100 % 6 L/min Simple mask X -- 14 No Pain    10/24/24 1515 -- 114 -- 171/116 -- -- -- -- -- -- -- --    10/24/24 1425 -- -- -- -- -- -- -- -- -- -- -- No Pain    OBSERV: Received to CCL holding area by stretcher [facility] entral transporter for elective study.  Aware of planned procedure and offers no questions.  Spoke with DO Bambi and informed consent obtained for procedure, all questions answered. at 10/24/24 1425    10/24/24 1234 98.8 °F (37.1 °C) 114 16 171/116 139 98 % -- None (Room air) -- Lying -- No Pain              Pertinent Labs/Diagnostic Test Results:   Radiology:  No orders to display     Cardiology:  ECG 12 lead   Final Result by Augusto Law MD (10/24 2033)   Sinus bradycardia   ST & T wave abnormality, consider inferior  ischemia   ST & T wave abnormality, consider anterior ischemia   Prolonged QT   Abnormal ECG   Confirmed by Augusto Law ([id number]) on 10/24/2024 8:33:31 PM      Cardiac ep lab eps/ablations   Final Result by Tyrone Lacy DO (10/24 8788)   Images from the original [place] [address]       INVASIVE CARDIOLOGY- ATRIAL FIBRILLATION ABLATION       PT NAME: Radha Dodson   MRN: [id number]   : 1950      PERFORMING/ATTENDING PHY: Tyrone Lacy DO      REFERRING PHY: [place]   DATE OF PROCEDURE: 10/24/24      PREOPERATIVE DIAGNOSIS:   symptomatic persistent atrial fibrillation   Typical atrial flutter      POSTOPERATIVE DIAGNOSIS Successful ablation of atrial fibrillation with    Bidirectional Block in 4 pulmonary veins and left atrial posterior wall    isolation.  Successful catheter ablation of atrial flutter         PROCEDURES PERFORMED    1. Pulmonary vein isolation for treatment of atrial fibrillation   2. Catheter ablation of typical atrial flutter   3.  Left atrial posterior wall isolation for treatment of secondary    trigger of atrial fibrillation    4. 3-D map with abbott mapping system    5. Intracardiac Echocardiography ICE.   6. Ultrasound guided venous access         ANESTHESIA General anesthesia.       PREOPERATIVE MEDICATIONS: ancef      INFORMED CONSENT: Risks, benefits, and alternatives to atrial fibrillation    ablation and associated procedures discussed. The patient understood risks    include but not limited to death, esophageal injury, phrenic nerve injury    (diaphragm), stroke, bleeding and vascular complication, and bleeding    around the heart and need for repeat procedure.       Procedure Description:   After informed consent was obtained, the patient was brought to the    electrophysiology laboratory NPO. A time out was called and the patient    was properly identified. The patient was  pre-medicated as above.  Patient    was prepped and draped in the usual sterile fashion.       Using ultrasound guidance venous access x 1 was obtained in the right    common femoral vein and x 2 in the left common femoral vein.  20,000 units    of heparin were bolused immediately with a goal to keep the ACT greater    than 350 seconds throughout the procedure.      A short 11 Liberian placed in right common femoral vein and 8 Liberian sheath    x 2 placed in the right common femoral vein.         A decapolar electrophysiologic catheter was placed in the coronary sinus    for left atrial pacing and recording.        Intracardiac echo was placed into the right atrium.  The right atrium left    atrium right ventricle and left ventricle were visualized using    intracardiac echo.  Intracardiac echo was used to visualize the fossa    ovalis during transseptal catheterization.  The location of the Farapulse    and catheters were visualized throughout the procedure using intracardiac    echo.  Prior to removal of catheters intracardiac echo demonstrated no    pericardial effusion.      The patient was stuck in atrial flutter.  Cycle length 280 msec.      An atrial flutter ablation of typical cavotricuspid isthmus flutter was    performed using an irrigated tip ablation catheter [employer].  Starting at 6:00 on the tricuspid annulus and ablated down    to the inferior vena cava, RF application was performed between 30 and 35    gonsalves until bidirectional block was achieved.  All ablation lesions in the    location of the His bundle were mapped electro anatomically.  3D    electroanatomic mapping of atrial flutter ablation located below.  The    atrial flutter terminated during the ablation and bidirectional block was    achieved.      Once the ACT was greater than 350 seconds transseptal catheterization was    performed using a BR K1 needle through an SLO sheath.      Grid HD catheter was placed into the left  atrium and voltage map was    performed of the pulmonary veins and posterior wall.         Farpulse catheter advanced to left atrium .  Pulmonary vein isolation    performed in each vein in the flower and basket mode.  Posterior wall    isolation was performed using the flower mode.  Grid HD catheter placed    back in the left atrium and pulmonary veins and posterior wall remapped    demonstrating no voltage.  Catheters were removed from the left atrium and    the atrial flutter line was rechecked and bidirectional block was present    with a CTI conduction time of 150 to 160 ms.  Atrial pacing confirmed    bidirectional block         After successful pulmonary vein isolation, posterior wall electrical    isolation and successful ablation of cavotricuspid isthmus dependent    atrial flutter intracardiac echo demonstrated no pericardial effusion.     Catheters were removed figure-of-eight sutures were tied around the venous    access sites.  Protamine 75 units were bolused and hemostasis achieved.      Patient's chest wall was prepped and draped using sterile technique.     Incision made over the second sternal border.  Pocket made loop recorder    placed in pocket and pocket closed with 4-0 Falatyn sutures.  Sterile    dressing applied      Patient awoke in stable condition and was transferred to the    postanesthesia care unit.         EBL: Minimal   Complications: None   Contrast: None   Findings: Typical atrial flutter successfully ablated.  Successful left    atrial posterior wall isolation and pulmonary vein isolation for treatment    of persistent atrial fibrillation.  Patient's left atrium was dilated.     Successful ILR placement      The patient tolerated the procedure well.      Plan: Routine postoperative care.  Restart anticoagulation immediately.      3D electroanatomic map of left atrium pre and post-pulsed field ablation    demonstrating no voltage in the pulmonary veins post Farapulse application          The area with voltage by left inferior pulmonary vein was reablated    following the above map      3D electroanatomic map of right atrium showing cavotricuspid isthmus    dependent atrial flutter ablation              COMPLICATIONS: None   EBL: Minimal.    CONTRAST: None                   PLAN Postoperative monitoring ovrnight. Resume oral anticoagulation.             Pre Ablation MAURA   Final Result by Tyrone Lacy DO (10/24 1730)        Left Ventricle: Wall thickness is normal. The left ventricular ejection    fraction is 35%. Systolic function is moderately reduced. Global    longitudinal strain is reduced. There is moderate global hypokinesis.     Right Ventricle: Right ventricular cavity size is mildly dilated.    Systolic function is low normal.     Left Atrium: The atrium is moderately dilated.     Right Atrium: The atrium is mildly dilated.     Atrial Septum: No patent foramen ovale detected, confirmed at rest    using color doppler.     Left Atrial Appendage: There is reduced function. There is no thrombus.     Mitral Valve: There is mild regurgitation.     Tricuspid Valve: There is mild regurgitation.         ECG 12 lead   Final Result by Augusto Law MD (10/24 7118)   Possible Atrial flutter with 2 to 1 block   Nonspecific ST and T wave abnormality   Abnormal ECG   Confirmed by Augusto Law ([id number]) on 10/24/2024 1:41:34 PM        GI:  No orders to display           Results from last 7 days   Lab Units 10/25/24  0531 10/24/24  1230   WBC Thousand/uL 12.99* 5.16   HEMOGLOBIN g/dL 12.5 14.2   HEMATOCRIT % 38.1 43.1   PLATELETS Thousands/uL 271 345   TOTAL NEUT ABS Thousands/µL  --  2.72         Results from last 7 days   Lab Units 10/25/24  1024 10/25/24  0531 10/24/24  1230   SODIUM mmol/L 134* 135 139   POTASSIUM mmol/L 5.0 4.7 4.2   CHLORIDE mmol/L 103 105 102   CO2 mmol/L 22 20* 30   ANION GAP mmol/L 9 10 7   BUN mg/dL 36* 33* 18   CREATININE mg/dL 2.12* 2.15* 1.23   EGFR ml/min/1.73sq m 22 22 43    CALCIUM mg/dL 8.4 7.7* 9.6             Results from last 7 days   Lab Units 10/25/24  1024 10/25/24  0531 10/24/24  1230   GLUCOSE RANDOM mg/dL 183* 232* 112             Results from last 7 days   Lab Units 10/24/24  1230   PROTIME seconds 18.3*   INR  1.49*               Past Medical History:   Diagnosis Date    High cholesterol     Stroke (HCC)      Present on Admission:   Atrial fibrillation and flutter (HCC)      Admitting Diagnosis: Atrial fibrillation and flutter (HCC) [I48.91, I48.92]  Age/Sex: 74 y.o. female    Network Utilization Review Department  ATTENTION: Please call with any questions or concerns to [phone] and carefully listen to the prompts so that you are directed to the right person. All voicemails are confidential.   For Discharge needs, contact Care Management DC Support Team at [phone] opt. 2  Send all requests for admission clinical reviews, approved or denied determinations and any other requests to dedicated fax number below belonging to the campus where the patient is receiving treatment. List of dedicated fax numbers for the Facilities:  FACILITY NAME UR FAX NUMBER   ADMISSION DENIALS (Administrative/Medical Necessity) [phone]   DISCHARGE SUPPORT TEAM (NETWORK) [phone]   PARENT CHILD HEALTH (Maternity/NICU/Pediatrics) [phone]   [facility] [phone]   [facility] [phone]   [facility] [phone]   [facility] [phone]   [facility] [phone]   [facility] [phone]   [facility] [phone]   [facility] [phone]   [facility] [phone]   [facility] [phone]   [facility] [phone]   [facility]  [phone]

## 2024-10-25 NOTE — DISCHARGE SUMMARY
Discharge Summary - Electrophysiology   Name: Radha Dodson 74 y.o. female I MRN: [id number]  Unit/Bed#: [facility]PHP 519-01 I Date of Admission: 10/24/2024   Date of Service: 10/25/2024 I Hospital Day: 0  { ?Quick Links I Problem List I PORCH I Billing Tip:56708}    Discharge Summary - Radha Dodson 74 y.o. female MRN: [id number]    Unit/Bed#: [facility]PHP 519-01 Encounter: [id number]      Admission Date: 10/24/2024   Discharge Date: 10/27/2024    Discharge Diagnosis:   Assessment & Plan  Atrial fibrillation and flutter (HCC)  - anticoagulated with Xarelto / [id number] score of 4 (age, sex, CVA)  - EF of 40% per echo 10/2024 / severe dilation of left atrium noted  - rate control: metoprolol  - antiarrhythmic therapy: amiodarone  - prior cardioversion: failed Cvx4 9/27 then successful with amio load 9/28  - now status post ablation of atrial fibrillation with pulmonary vein isolation and posterior wall isolation using PFA along with CTI line by Dr. Lacy on 10/24/2024  - continue amiodarone and anticoagulation   - outpatient follow up   - Stable for discharge     Medtronic loop recorder in situ  - implanted 10/24/2024 for further afib surveillance  Acute kidney injury (HCC)  - likely in the setting of urinary retention from genital prolapse   - no fritz placed yesterday   - renal function did improve with fluids and voiding  - urology consulted - outpatient follow up with GYN for pessary consideration   H/O ischemic right MCA stroke  - stroke 5/2024  - follow up Zio monitor showed atrial fibrillation and started on Xarelto        Procedures Performed:   1. Pulmonary vein isolation for treatment of atrial fibrillation  2. Catheter ablation of typical atrial flutter  3.  Left atrial posterior wall isolation for treatment of secondary trigger of atrial fibrillation   4. 3-D map with abbott mapping system   5. Intracardiac Echocardiography ICE.  6. Ultrasound guided venous access  Orders Placed This Encounter   Procedures    Cardiac ep  lab eps/ablations       Consultants: None    HPI: Please refer to the initial history and physical as well as procedure notes for full details. Briefly, Radha Dodson is a 74 y.o. year old female with likely persistent atrial fibrillation/flutter anticoagulated with Xarelto, and prior ischemic right MCA stroke.     Patient became established with cardiology after suffering stroke in May 2024.  This felt that she required ZIO monitor and potential loop recorder implantation to monitor for atrial fibrillation as a cause of this stroke.  Atrial fibrillation with RVR was picked up on Zio monitor with burden of 17% after 2 weeks.  She had been placed on Xarelto but presented on 9/27 due to symptomatic RVR and heart failure.  She was attempted to be shocked 4 times in the emergency room that was unsuccessful.  Therefore, she was admitted and placed on IV amiodarone and cardioverted again which was successful.  Unfortunately in follow-up she was found to be in flutter.  Outpatient echo showed EF of 40%.  Electrophysiology was reached out to and outpatient ablation was set up.  I did call her on 10/17 to go over the ablation procedure along with loop recorder implantation and she was agreeable. She presented this hospital admission to undergo this procedure.     Hospital Course: Radha Dodson presented at her baseline state of health. After the procedure was explained in detail and consent was obtained, she underwent ablation of atrial fibrillation/flutter and loop recorder implantation without complications. She tolerated the procedure well. She was then monitored overnight for further observation.    There were no acute issues or events overnight. The following morning She denied all cardiac complaints, including chest pain/pressure, dyspnea, palpitations, dizziness, lightheadedness, or syncope. Her vital signs were reviewed and although she had been bradycardic and hypotensive these had normalized.  Unfortunately, lab  results did show that she had GEGE and therefore she was given IV fluid and placed on urinary retention protocol. Telemetry showed sinus rhythm in the 60s. Her groins were soft without significant hematoma or recurrent bleeding.  She was advised that she would need to stay overnight with repeat blood work in the morning and she was agreeable to this.    The following morning, creatinine was  still 2. Urology was conuslt for consideration for fritz placement after straight cath. No fritz was placed and instead PVR were monitored. Fluid was given overnight and renal function did improve to baseline of 1.2 on day of discharge. She will follow up with outpatient gyn for consideration of pessary per recommendation of urology. Physical exam on the day of discharge was as follows:    GEN: NAD, alert and oriented, well appearing  SKIN: dry without significant lesions or rashes  HEENT: NCAT, PERRL, EOMs intact  NECK: No JVD or carotid bruits appreciated  CARDIOVASCULAR: RRR, normal S1, S2 without murmurs, rubs, or gallops appreciated  LUNGS: Clear to auscultation bilaterally without wheezes, rhonchi, or rales  ABDOMEN: Soft, nontender, nondistended  EXTREMITIES/VASCULAR: perfused without clubbing, cyanosis, or edema b/l  PSYCH: Normal mood and affect  NEURO: CN ll-Xll grossly intact    She was given routine post ablation discharge instructions and restrictions, and these were explained in detail. She was given a follow up appointment with John Salas PA-C, and she was instructed to follow up with her primary cardiologist as previously instructed.    In terms of her medications, her amiodarone was lowered to 100 mg daily.  No other changes were made at this time. (Xarelto renal dosing)    She is stable for discharge at this time with all questions answered. She was discussed in detail with Dr. Lacy who is in agreement with this discharge summary.     Discharge Medications:  See after visit summary for reconciled discharge  medications provided to patient and family.      Medications Prior to Admission:     amiodarone 200 mg tablet    atorvastatin (LIPITOR) 40 mg tablet    metoprolol succinate (TOPROL-XL) 25 mg 24 hr tablet    Multiple Vitamin (multivitamin) tablet    NON FORMULARY    rivaroxaban (Xarelto) 20 mg tablet      Pertininet Labs/diagnostics:  CBC with diff:   Results from last 7 days   Lab Units 10/25/24  0531 10/24/24  1230   WBC Thousand/uL 12.99* 5.16   HEMOGLOBIN g/dL 12.5 14.2   HEMATOCRIT % 38.1 43.1   MCV fL 95 92   PLATELETS Thousands/uL 271 345   RBC Million/uL 4.00 4.71   MCH pg 31.3 30.1   MCHC g/dL 32.8 32.9   RDW % 14.1 12.8   MPV fL 11.3 10.1   NRBC AUTO /100 WBCs  --  0       BMP:  Results from last 7 days   Lab Units 10/25/24  1024 10/25/24  0531 10/24/24  1230   POTASSIUM mmol/L 5.0 4.7 4.2   CHLORIDE mmol/L 103 105 102   CO2 mmol/L 22 20* 30   BUN mg/dL 36* 33* 18   CREATININE mg/dL 2.12* 2.15* 1.23   CALCIUM mg/dL 8.4 7.7* 9.6       Magnesium:       Coags:   Results from last 7 days   Lab Units 10/24/24  1230   INR  1.49*         Complications: none    Condition at Discharge: good     Discharge instructions/Information to patient and family:   See after visit summary for information provided to patient and family.      Provisions for Follow-Up Care:  See after visit summary for information related to follow-up care and any pertinent home health orders.      Disposition: Home    Planned Readmission: No    Discharge Statement   I spent 45 minutes minutes discharging the patient. This time was spent on the day of discharge. I had direct contact with the patient on the day of discharge. Additional documentation is required if more than 30 minutes were spent on discharge. Evaluating the incision, discussing discharge instructions and restrictions, arranging follow up appointments, discussing medications

## 2024-10-26 LAB
ANION GAP SERPL CALCULATED.3IONS-SCNC: 9 MMOL/L (ref 4–13)
BUN SERPL-MCNC: 44 MG/DL (ref 5–25)
CALCIUM SERPL-MCNC: 8.4 MG/DL (ref 8.4–10.2)
CHLORIDE SERPL-SCNC: 103 MMOL/L (ref 96–108)
CO2 SERPL-SCNC: 22 MMOL/L (ref 21–32)
CREAT SERPL-MCNC: 2 MG/DL (ref 0.6–1.3)
GFR SERPL CREATININE-BSD FRML MDRD: 24 ML/MIN/1.73SQ M
GLUCOSE SERPL-MCNC: 122 MG/DL (ref 65–140)
POTASSIUM SERPL-SCNC: 5.2 MMOL/L (ref 3.5–5.3)
SODIUM SERPL-SCNC: 134 MMOL/L (ref 135–147)

## 2024-10-26 PROCEDURE — 80048 BASIC METABOLIC PNL TOTAL CA: CPT | Performed by: PHYSICIAN ASSISTANT

## 2024-10-26 PROCEDURE — 99232 SBSQ HOSP IP/OBS MODERATE 35: CPT | Performed by: PHYSICIAN ASSISTANT

## 2024-10-26 RX ORDER — SODIUM CHLORIDE 9 MG/ML
75 INJECTION, SOLUTION INTRAVENOUS CONTINUOUS
Status: DISCONTINUED | OUTPATIENT
Start: 2024-10-26 | End: 2024-10-26

## 2024-10-26 RX ORDER — SODIUM CHLORIDE 9 MG/ML
75 INJECTION, SOLUTION INTRAVENOUS CONTINUOUS
Status: DISPENSED | OUTPATIENT
Start: 2024-10-26 | End: 2024-10-27

## 2024-10-26 RX ADMIN — ATORVASTATIN CALCIUM 40 MG: 40 TABLET, FILM COATED ORAL at 17:08

## 2024-10-26 RX ADMIN — RIVAROXABAN 15 MG: 15 TABLET, FILM COATED ORAL at 17:08

## 2024-10-26 RX ADMIN — METOPROLOL SUCCINATE 25 MG: 25 TABLET, FILM COATED, EXTENDED RELEASE ORAL at 08:31

## 2024-10-26 RX ADMIN — AMIODARONE HYDROCHLORIDE 100 MG: 200 TABLET ORAL at 08:31

## 2024-10-26 RX ADMIN — SODIUM CHLORIDE 75 ML/HR: 0.9 INJECTION, SOLUTION INTRAVENOUS at 13:32

## 2024-10-26 RX ADMIN — SODIUM CHLORIDE 75 ML/HR: 0.9 INJECTION, SOLUTION INTRAVENOUS at 11:46

## 2024-10-26 NOTE — PROGRESS NOTES
Urology  [facility] Staff DOREEN, on call  Progress Note    Notified in Epic chat w/ Mayank Thomas PA-C (EP), RN Cecilia Lawton, and ERAN Leonard (Urology) regarding placement of fritz.  Pt had urinary retention and GEGE w/ plan for fritz placement, however due to pt's anatomy, RN has concerns for her ability to place.  Further information from DIONISIO Foster provided to include h/o pelvic organ prolapse.  S/p cysto 7/15 ([place]) was negative. Likely needs outpt uro-gyn eval and poss pessary. C/f cystocele causing GEGE.  U/S Kidney/ bladder (p)    /61   Pulse 64   Temp 98.9 °F (37.2 °C)   Resp 17   Ht 5' (1.524 m)   Wt 69.3 kg (152 lb 12.5 oz)   SpO2 98%   BMI 29.84 kg/m²     Chart reviewed. Pt seen.  Denies bladder fullness/ pelvic pressure      Pt was admitted w/ AFib/flutter w/ h/o prior ischemic R MCA CVA  S/p Ablation w/ PV isolation & posterior wall isolation 10/24/2024 ([place])    Pt w/ post-procedure urinary retention.    -Straight cath #1: 10/25 @ 17:06 for 550ml.    -Straight cath #2: 10/26 @ 0901 for 600ml ()  -Pt has voided 100ml per RN  -Pt voided another 50ml just prior to my arrival  -BS readings 199- 230ml    Chart reviewed:   Baseline renal fxn w/ Cr 1-1.26  Admission Cr 1.23 (10/24)  Hypotensive 88/53- 111/63, 10/24 (17:59- 22:15)  Cr (10/25) 2.15 (05:31)-  2.12 (10:24)  Cr today (10/26) 2.0 (04:37)        Imp/ Plan:   Urinary retention s/p straight cath x2  --I have declined fritz insertion, for this pt does not meet Urinary Retention Criteria for insertion.   Altho in smaller amts, she is voiding. BS <350.  There has been no technical difficulty w/ str cath reported thus far.  --? Etiology multifactorial (pelvic prolapse, surgical intervention)  PostOp GEGE   --Altho her renal fxn is still abnormal, GEGE seems to have plateau'd/ stablilized.  --? Etiology of pelvic prolapse causing urinary retention vs  Decreased renal perfusion from hypotension and now normotension  (outpt BP noted as 150/100.  Admission /116.  BPs inpatient now 123- 143/ 60-70s).  --rec'g IVF: NS @ 75  --consider Nephrology consult  --Kidney/ bladder u/s (p)  Afib/ flutter s/p Ablation--PPD #2  --mgmt per MANUEL Maldonado-Scaff  10/26/2024

## 2024-10-26 NOTE — ASSESSMENT & PLAN NOTE
- anticoagulated with Xarelto / Xleap7Ikys score of 4 (age, sex, CVA)  - EF of 40% per echo 10/2024 / severe dilation of left atrium noted  - rate control: metoprolol  - antiarrhythmic therapy: amiodarone  - prior cardioversion: failed Cvx4 9/27 then successful with amio load 9/28  - now status post ablation of atrial fibrillation with pulmonary vein isolation and posterior wall isolation using PFA along with CTI line by Dr. Lacy on 10/24/2024  - maintained sinus rhythm   - conintue amiodarone and reduced dose xarelto

## 2024-10-26 NOTE — ASSESSMENT & PLAN NOTE
- b/; cr 1.1-1.3   - Post ablation Cr. 2.1   - Cr today still 2 after 1 L bolus   - having urinary retention in the setting of genital prolapse   - consult urology - known to Dr. Fagan   - fritz placed - fluids to start post insertion   - f/u US   - bmp in the am

## 2024-10-26 NOTE — UTILIZATION REVIEW
Continued Stay Review    SEE INITIAL REVIEW AT BOTTOM    Date: 10/26  Day 3: Has surpassed a 2nd midnight with active treatments and services.    Current Patient Class: Inpatient  Current Level of Care: med/surg    HPI:74 y.o. female initially admitted on 10/25 post-ablation d/t urinary retention retention requiring straight cath. Cr elevated at 2.1 requiring continued IVFs. Amiodarone lowered to 100 mg and Xarelto adjusted to 15 mg daily.     Assessment/Plan: NSR on tele, no further a-fib.  Did have some urinary retention. States she follows with urology, 600 ml fluid retained in bladder. Cr today still 2 after 1 L bolus. Having urinary retention in the setting of genital prolapse. Continue IVFs. Consult urology. Lynn placed. US kidney and bladder pending. BMP in AM.  Remains in NSR. Continue amiodarone and reduced dose Xarelto.  Continue supportive and other po meds. OOB, ambulate with assist. SCDs.    Medications:   Scheduled Medications:  amiodarone, 100 mg, Oral, Daily With Breakfast  atorvastatin, 40 mg, Oral, After Dinner  metoprolol succinate, 25 mg, Oral, Daily  rivaroxaban, 15 mg, Oral, Daily With Dinner    Continuous IV Infusions:  sodium chloride, 75 mL/hr, Intravenous, Continuous    PRN Meds:  acetaminophen, 650 mg, Oral, Q4H PRN  docusate sodium, 100 mg, Oral, BID PRN  hydrOXYzine HCL, 50 mg, Oral, Q6H PRN  melatonin, 3 mg, Oral, HS PRN      Discharge Plan: TBD    Vital Signs (last 3 days)       Date/Time Temp Pulse Resp BP MAP (mmHg) SpO2 O2 Flow Rate (L/min) O2 Device Cardiac (WDL) Patient Position - Orthostatic VS Susan Coma Scale Score Pain    10/26/24 11:03:39 98.9 °F (37.2 °C) 64 17 130/61 84 98 % -- -- -- -- -- --    10/26/24 1026 -- -- -- -- -- -- -- -- -- -- 15 No Pain    10/26/24 07:17:22 98.8 °F (37.1 °C) 62 17 128/76 93 93 % -- -- -- -- -- --    10/26/24 02:12:39 97.9 °F (36.6 °C) 57 20 150/77 101 92 % -- -- -- Lying -- --    10/25/24 22:45:38 97.9 °F (36.6 °C) 56 19 128/73 91 98 % -- --  -- Lying -- --    10/25/24 22:45:17 -- 56 22 128/73 91 99 % -- -- -- -- -- --    10/25/24 2020 -- -- -- -- -- -- -- None (Room air) -- -- 15 No Pain    10/25/24 18:49:02 -- 59 20 143/73 96 94 % -- -- -- -- -- --    10/25/24 15:23:25 97.9 °F (36.6 °C) 58 18 129/71 90 99 % -- -- -- Lying -- --       Weight (last 2 days)       Date/Time Weight    10/26/24 0500 69.3 (152.78)    10/25/24 1445 69.5 (153.22)       Pertinent Labs/Diagnostic Results:   Radiology:  US kidney and bladder    (Results Pending)     Results from last 7 days   Lab Units 10/26/24  0437 10/25/24  1024 10/25/24  0531 10/24/24  1230   SODIUM mmol/L 134* 134* 135 139   POTASSIUM mmol/L 5.2 5.0 4.7 4.2   CHLORIDE mmol/L 103 103 105 102   CO2 mmol/L 22 22 20* 30   ANION GAP mmol/L 9 9 10 7   BUN mg/dL 44* 36* 33* 18   CREATININE mg/dL 2.00* 2.12* 2.15* 1.23   EGFR ml/min/1.73sq m 24 22 22 43   CALCIUM mg/dL 8.4 8.4 7.7* 9.6     Results from last 7 days   Lab Units 10/26/24  0437 10/25/24  1024 10/25/24  0531 10/24/24  1230   GLUCOSE RANDOM mg/dL 122 183* 232* 112         Network Utilization Review Department  ATTENTION: Please call with any questions or concerns to [phone] and carefully listen to the prompts so that you are directed to the right person. All voicemails are confidential.   For Discharge needs, contact Care Management DC Support Team at [phone] opt. 2  Send all requests for admission clinical reviews, approved or denied determinations and any other requests to dedicated fax number below belonging to the campus where the patient is receiving treatment. List of dedicated fax numbers for the Facilities:  FACILITY NAME UR FAX NUMBER   ADMISSION DENIALS (Administrative/Medical Necessity) [phone]   DISCHARGE SUPPORT TEAM (NETWORK) [phone]   PARENT CHILD HEALTH (Maternity/NICU/Pediatrics) [phone]   [facility] [phone]   [facility] [phone]   Critical access hospital  [facility] [phone]   [facility] [phone]   [facility] [phone]   [facility] [phone]   [facility] [phone]   [facility] [phone]   [facility] [phone]   [facility] [phone]   [facility] [phone]   [facility] [phone]

## 2024-10-26 NOTE — PROGRESS NOTES
Progress Note - Electrophysiology   Name: Radha Dodson 74 y.o. female I MRN: [id number]  Unit/Bed#: [facility] 519-01 I Date of Admission: 10/24/2024   Date of Service: 10/26/2024 I Hospital Day: 1     Assessment & Plan  Atrial fibrillation and flutter (HCC)  - anticoagulated with Xarelto / [id number] score of 4 (age, sex, CVA)  - EF of 40% per echo 10/2024 / severe dilation of left atrium noted  - rate control: metoprolol  - antiarrhythmic therapy: amiodarone  - prior cardioversion: failed Cvx4 9/27 then successful with amio load 9/28  - now status post ablation of atrial fibrillation with pulmonary vein isolation and posterior wall isolation using PFA along with CTI line by Dr. Lacy on 10/24/2024  - maintained sinus rhythm   - conintue amiodarone and reduced dose xarelto   Medtronic loop recorder in situ  - implanted 10/24/2024 for further afib surveillance  Acute kidney injury (HCC)  - b/; cr 1.1-1.3   - Post ablation Cr. 2.1   - Cr today still 2 after 1 L bolus   - having urinary retention in the setting of genital prolapse   - consult urology - known to Dr. Fagan   - fritz placed - fluids to start post insertion   - f/u US   - bmp in the am     H/O ischemic right MCA stroke  - stroke 5/2024  - follow up Zio monitor showed atrial fibrillation and started on Xarelto        Subjective/Objective   Subjective: doing well no a fib. Did have some urinary retention. She told me she follows with urology. 600 ml fluid retained in bladder.     TELE: sinus rhythm       Objective:  Vitals: /76   Pulse 62   Temp 98.8 °F (37.1 °C)   Resp 17   Ht 5' (1.524 m)   Wt 69.3 kg (152 lb 12.5 oz)   SpO2 93%   BMI 29.84 kg/m²     Vitals:    10/25/24 1445 10/26/24 0500   Weight: 69.5 kg (153 lb 3.5 oz) 69.3 kg (152 lb 12.5 oz)     Orthostatic Blood Pressures      Flowsheet Row Most Recent Value   Blood Pressure 128/76 filed at 10/26/2024 0717   Patient Position - Orthostatic VS Lying filed at 10/26/2024 0211               Intake/Output Summary (Last 24 hours) at 10/26/2024 1050  Last data filed at 10/26/2024 1001  Gross per 24 hour   Intake 1820 ml   Output 1250 ml   Net 570 ml       Invasive Devices       Peripheral Intravenous Line  Duration             Peripheral IV 10/25/24 Left;Proximal;Ventral (anterior) Forearm <1 day                              Scheduled Meds:  Current Facility-Administered Medications   Medication Dose Route Frequency Provider Last Rate    acetaminophen  650 mg Oral Q4H PRN Irena Soden, PA-ANJANA      amiodarone  100 mg Oral Daily With Breakfast Irena Soden, PA-ANJANA      atorvastatin  40 mg Oral After Dinner Irena Soden, PA-ANJANA      docusate sodium  100 mg Oral BID PRN Irena Soden, PA-C      hydrOXYzine HCL  50 mg Oral Q6H PRN Irena Soden, PA-C      melatonin  3 mg Oral HS PRN Irena Soden, PA-ANJANA      metoprolol succinate  25 mg Oral Daily Irena Soden, PA-ANJANA      rivaroxaban  15 mg Oral Daily With Dinner Irena Soden, PA-ANJANA      sodium chloride  75 mL/hr Intravenous Continuous Mayank Thomas PA-C       Continuous Infusions:sodium chloride, 75 mL/hr      PRN Meds:.  acetaminophen    docusate sodium    hydrOXYzine HCL    melatonin    Review of Systems:  Review of Systems   Constitutional: Negative for fever and malaise/fatigue.   Cardiovascular:  Negative for chest pain, dyspnea on exertion, irregular heartbeat and palpitations.   Respiratory:  Negative for shortness of breath.    Genitourinary:  Positive for frequency, incomplete emptying and urgency. Negative for bladder incontinence, flank pain and hesitancy.   All other systems reviewed and are negative.    ROS as noted above, otherwise 12 point review of systems was performed and is negative.     Physical Exam:   Physical Exam  Vitals and nursing note reviewed.   Constitutional:       General: She is not in acute distress.  HENT:      Head: Normocephalic and atraumatic.   Cardiovascular:      Rate and Rhythm: Normal rate and regular rhythm.   Pulmonary:       Effort: Pulmonary effort is normal.      Breath sounds: Normal breath sounds.   Musculoskeletal:      Right lower leg: No edema.      Left lower leg: No edema.   Skin:     General: Skin is warm and dry.      Coloration: Skin is not jaundiced.   Neurological:      Mental Status: She is alert.                   Lab Results: I have personally reviewed pertinent lab results.    Results from last 7 days   Lab Units 10/25/24  0531 10/24/24  1230   WBC Thousand/uL 12.99* 5.16   HEMOGLOBIN g/dL 12.5 14.2   HEMATOCRIT % 38.1 43.1   PLATELETS Thousands/uL 271 345     Results from last 7 days   Lab Units 10/26/24  0437 10/25/24  1024 10/25/24  0531   POTASSIUM mmol/L 5.2 5.0 4.7   CHLORIDE mmol/L 103 103 105   CO2 mmol/L 22 22 20*   BUN mg/dL 44* 36* 33*   CREATININE mg/dL 2.00* 2.12* 2.15*   CALCIUM mg/dL 8.4 8.4 7.7*     Results from last 7 days   Lab Units 10/24/24  1230   INR  1.49*           Imaging: Results Review Statement: No pertinent imaging studies reviewed.  No results found for this or any previous visit.      VTE Pharmacologic Prophylaxis: xarelto  VTE Mechanical Prophylaxis: sequential compression device

## 2024-10-26 NOTE — PLAN OF CARE
Problem: PAIN - ADULT  Goal: Verbalizes/displays adequate comfort level or baseline comfort level  Description: Interventions:  - Encourage patient to monitor pain and request assistance  - Assess pain using appropriate pain scale  - Administer analgesics based on type and severity of pain and evaluate response  - Implement non-pharmacological measures as appropriate and evaluate response  - Consider cultural and social influences on pain and pain management  - Notify physician/advanced practitioner if interventions unsuccessful or patient reports new pain  Outcome: Progressing     Problem: INFECTION - ADULT  Goal: Absence or prevention of progression during hospitalization  Description: INTERVENTIONS:  - Assess and monitor for signs and symptoms of infection  - Monitor lab/diagnostic results  - Monitor all insertion sites, i.e. indwelling lines, tubes, and drains  - Monitor endotracheal if appropriate and nasal secretions for changes in amount and color  - [place] appropriate cooling/warming therapies per order  - Administer medications as ordered  - Instruct and encourage patient and family to use good hand hygiene technique  - Identify and instruct in appropriate isolation precautions for identified infection/condition  Outcome: Progressing  Goal: Absence of fever/infection during neutropenic period  Description: INTERVENTIONS:  - Monitor WBC    Outcome: Progressing     Problem: SAFETY ADULT  Goal: Patient will remain free of falls  Description: INTERVENTIONS:  - Educate patient/family on patient safety including physical limitations  - Instruct patient to call for assistance with activity   - Consult OT/PT to assist with strengthening/mobility   - Keep Call bell within reach  - Keep bed low and locked with side rails adjusted as appropriate  - Keep care items and personal belongings within reach  - Initiate and maintain comfort rounds  - Make Fall Risk Sign visible to staff  - Offer Toileting every  Hours,  in advance of need  - Initiate/Maintain alarm  - Obtain necessary fall risk management equipment:  - Apply yellow socks and bracelet for high fall risk patients  - Consider moving patient to room near nurses station  Outcome: Progressing  Goal: Maintain or return to baseline ADL function  Description: INTERVENTIONS:  -  Assess patient's ability to carry out ADLs; assess patient's baseline for ADL function and identify physical deficits which impact ability to perform ADLs (bathing, care of mouth/teeth, toileting, grooming, dressing, etc.)  - Assess/evaluate cause of self-care deficits   - Assess range of motion  - Assess patient's mobility; develop plan if impaired  - Assess patient's need for assistive devices and provide as appropriate  - Encourage maximum independence but intervene and supervise when necessary  - Involve family in performance of ADLs  - Assess for home care needs following discharge   - Consider OT consult to assist with ADL evaluation and planning for discharge  - Provide patient education as appropriate  Outcome: Progressing  Goal: Maintains/Returns to pre admission functional level  Description: INTERVENTIONS:  - Perform AM-PAC 6 Click Basic Mobility/ Daily Activity assessment daily.  - Set and communicate daily mobility goal to care team and patient/family/caregiver.   - Collaborate with rehabilitation services on mobility goals if consulted  - Perform Range of Motion  times a day.  - Reposition patient every  hours.  - Dangle patient  times a day  - Stand patient  times a day  - Ambulate patient  times a day  - Out of bed to chair times a day   - Out of bed for meals times a day  - Out of bed for toileting  - Record patient progress and toleration of activity level   Outcome: Progressing     Problem: DISCHARGE PLANNING  Goal: Discharge to home or other facility with appropriate resources  Description: INTERVENTIONS:  - Identify barriers to discharge w/patient and caregiver  - Arrange for  needed discharge resources and transportation as appropriate  - Identify discharge learning needs (meds, wound care, etc.)  - Arrange for interpretive services to assist at discharge as needed  - Refer to Case Management Department for coordinating discharge planning if the patient needs post-hospital services based on physician/advanced practitioner order or complex needs related to functional status, cognitive ability, or social support system  Outcome: Progressing     Problem: Knowledge Deficit  Goal: Patient/family/caregiver demonstrates understanding of disease process, treatment plan, medications, and discharge instructions  Description: Complete learning assessment and assess knowledge base.  Interventions:  - Provide teaching at level of understanding  - Provide teaching via preferred learning methods  Outcome: Progressing

## 2024-10-27 ENCOUNTER — TELEPHONE (OUTPATIENT)
Dept: OTHER | Facility: HOSPITAL | Age: 74
End: 2024-10-27

## 2024-10-27 VITALS
WEIGHT: 150.57 LBS | OXYGEN SATURATION: 94 % | BODY MASS INDEX: 29.56 KG/M2 | DIASTOLIC BLOOD PRESSURE: 73 MMHG | RESPIRATION RATE: 18 BRPM | HEART RATE: 66 BPM | HEIGHT: 60 IN | TEMPERATURE: 98.1 F | SYSTOLIC BLOOD PRESSURE: 125 MMHG

## 2024-10-27 LAB
ANION GAP SERPL CALCULATED.3IONS-SCNC: 7 MMOL/L (ref 4–13)
BUN SERPL-MCNC: 30 MG/DL (ref 5–25)
CALCIUM SERPL-MCNC: 8.5 MG/DL (ref 8.4–10.2)
CHLORIDE SERPL-SCNC: 107 MMOL/L (ref 96–108)
CO2 SERPL-SCNC: 24 MMOL/L (ref 21–32)
CREAT SERPL-MCNC: 1.2 MG/DL (ref 0.6–1.3)
ERYTHROCYTE [DISTWIDTH] IN BLOOD BY AUTOMATED COUNT: 14.3 % (ref 11.6–15.1)
GFR SERPL CREATININE-BSD FRML MDRD: 44 ML/MIN/1.73SQ M
GLUCOSE SERPL-MCNC: 89 MG/DL (ref 65–140)
HCT VFR BLD AUTO: 32.5 % (ref 34.8–46.1)
HGB BLD-MCNC: 10.6 G/DL (ref 11.5–15.4)
MCH RBC QN AUTO: 30 PG (ref 26.8–34.3)
MCHC RBC AUTO-ENTMCNC: 32.6 G/DL (ref 31.4–37.4)
MCV RBC AUTO: 92 FL (ref 82–98)
PLATELET # BLD AUTO: 209 THOUSANDS/UL (ref 149–390)
PMV BLD AUTO: 10.9 FL (ref 8.9–12.7)
POTASSIUM SERPL-SCNC: 4.7 MMOL/L (ref 3.5–5.3)
RBC # BLD AUTO: 3.53 MILLION/UL (ref 3.81–5.12)
SODIUM SERPL-SCNC: 138 MMOL/L (ref 135–147)
WBC # BLD AUTO: 11.74 THOUSAND/UL (ref 4.31–10.16)

## 2024-10-27 PROCEDURE — 80048 BASIC METABOLIC PNL TOTAL CA: CPT | Performed by: PHYSICIAN ASSISTANT

## 2024-10-27 PROCEDURE — 85027 COMPLETE CBC AUTOMATED: CPT | Performed by: PHYSICIAN ASSISTANT

## 2024-10-27 PROCEDURE — NC001 PR NO CHARGE: Performed by: UROLOGY

## 2024-10-27 PROCEDURE — NC001 PR NO CHARGE: Performed by: PHYSICIAN ASSISTANT

## 2024-10-27 RX ORDER — METOPROLOL SUCCINATE 25 MG/1
25 TABLET, EXTENDED RELEASE ORAL DAILY
Qty: 90 TABLET | Refills: 0 | Status: SHIPPED | OUTPATIENT
Start: 2024-10-28 | End: 2025-01-26

## 2024-10-27 RX ORDER — AMIODARONE HYDROCHLORIDE 100 MG/1
100 TABLET ORAL
Qty: 90 TABLET | Refills: 0 | Status: SHIPPED | OUTPATIENT
Start: 2024-10-28 | End: 2025-01-26

## 2024-10-27 RX ADMIN — METOPROLOL SUCCINATE 25 MG: 25 TABLET, FILM COATED, EXTENDED RELEASE ORAL at 08:28

## 2024-10-27 RX ADMIN — AMIODARONE HYDROCHLORIDE 100 MG: 200 TABLET ORAL at 08:28

## 2024-10-27 NOTE — NURSING NOTE
Patient was given all discharge information.  Patient states all questions were answered.  Patient states she wants to eat lunch then will call an uber.

## 2024-10-27 NOTE — TELEPHONE ENCOUNTER
Radha Dodson  Is a 74-year-old female known to the service for recurrent UTI high-grade cystocele's.  She was evaluated at [facility] for urinary retention--spontaneous resolution.  Please bring in patient for PVR.  Uncertain which gynecologist patient is known to.  But will need to have follow-up for pessary.  Please assist patient with uro-GYN referral etc. thank you.

## 2024-10-27 NOTE — QUICK NOTE
Radha oDdson  Is a 74-year-old female evaluated yesterday for urinary retention and GEGE.  History of high-grade cystocele and known to [facility] as well as gynecology (unclear).    There is been interval improvement with serum creatinine--now normalized.  Patient is voided adequately with negligible bladder scans.      Plan:  Discharge at the discretion of the primary team.  Patient is known to Dr. Fagan and have messaged his office to bring patient in for PVR.  Patient will also require follow-up with uro-/gynecology for pessary fitting.  No further  intervention indicated this hospital stay.

## 2024-10-27 NOTE — PLAN OF CARE
Problem: PAIN - ADULT  Goal: Verbalizes/displays adequate comfort level or baseline comfort level  Description: Interventions:  - Encourage patient to monitor pain and request assistance  - Assess pain using appropriate pain scale  - Administer analgesics based on type and severity of pain and evaluate response  - Implement non-pharmacological measures as appropriate and evaluate response  - Consider cultural and social influences on pain and pain management  - Notify physician/advanced practitioner if interventions unsuccessful or patient reports new pain  Outcome: Progressing     Problem: INFECTION - ADULT  Goal: Absence or prevention of progression during hospitalization  Description: INTERVENTIONS:  - Assess and monitor for signs and symptoms of infection  - Monitor lab/diagnostic results  - Monitor all insertion sites, i.e. indwelling lines, tubes, and drains  - Monitor endotracheal if appropriate and nasal secretions for changes in amount and color  - [place] appropriate cooling/warming therapies per order  - Administer medications as ordered  - Instruct and encourage patient and family to use good hand hygiene technique  - Identify and instruct in appropriate isolation precautions for identified infection/condition  Outcome: Progressing  Goal: Absence of fever/infection during neutropenic period  Description: INTERVENTIONS:  - Monitor WBC    Outcome: Progressing     Problem: SAFETY ADULT  Goal: Patient will remain free of falls  Description: INTERVENTIONS:  - Educate patient/family on patient safety including physical limitations  - Instruct patient to call for assistance with activity   - Consult OT/PT to assist with strengthening/mobility   - Keep Call bell within reach  - Keep bed low and locked with side rails adjusted as appropriate  - Keep care items and personal belongings within reach  - Initiate and maintain comfort rounds  - Make Fall Risk Sign visible to staff  - Apply yellow socks and bracelet  for high fall risk patients  - Consider moving patient to room near nurses station  Outcome: Progressing  Goal: Maintain or return to baseline ADL function  Description: INTERVENTIONS:  -  Assess patient's ability to carry out ADLs; assess patient's baseline for ADL function and identify physical deficits which impact ability to perform ADLs (bathing, care of mouth/teeth, toileting, grooming, dressing, etc.)  - Assess/evaluate cause of self-care deficits   - Assess range of motion  - Assess patient's mobility; develop plan if impaired  - Assess patient's need for assistive devices and provide as appropriate  - Encourage maximum independence but intervene and supervise when necessary  - Involve family in performance of ADLs  - Assess for home care needs following discharge   - Consider OT consult to assist with ADL evaluation and planning for discharge  - Provide patient education as appropriate  Outcome: Progressing  Goal: Maintains/Returns to pre admission functional level  Description: INTERVENTIONS:  - Perform AM-PAC 6 Click Basic Mobility/ Daily Activity assessment daily.  - Set and communicate daily mobility goal to care team and patient/family/caregiver.   - Collaborate with rehabilitation services on mobility goals if consulted  - Out of bed for toileting  - Record patient progress and toleration of activity level   Outcome: Progressing     Problem: DISCHARGE PLANNING  Goal: Discharge to home or other facility with appropriate resources  Description: INTERVENTIONS:  - Identify barriers to discharge w/patient and caregiver  - Arrange for needed discharge resources and transportation as appropriate  - Identify discharge learning needs (meds, wound care, etc.)  - Arrange for interpretive services to assist at discharge as needed  - Refer to Case Management Department for coordinating discharge planning if the patient needs post-hospital services based on physician/advanced practitioner order or complex needs  related to functional status, cognitive ability, or social support system  Outcome: Progressing     Problem: Knowledge Deficit  Goal: Patient/family/caregiver demonstrates understanding of disease process, treatment plan, medications, and discharge instructions  Description: Complete learning assessment and assess knowledge base.  Interventions:  - Provide teaching at level of understanding  - Provide teaching via preferred learning methods  Outcome: Progressing

## 2024-10-28 ENCOUNTER — TRANSITIONAL CARE MANAGEMENT (OUTPATIENT)
Dept: FAMILY MEDICINE CLINIC | Facility: CLINIC | Age: 74
End: 2024-10-28

## 2024-10-28 ENCOUNTER — TELEPHONE (OUTPATIENT)
Age: 74
End: 2024-10-28

## 2024-10-28 NOTE — UTILIZATION REVIEW
NOTIFICATION OF ADMISSION DISCHARGE   This is a Notification of Discharge from [facility]. Please be advised that this patient has been discharge from our facility. Below you will find the admission and discharge date and time including the patient’s disposition.   UTILIZATION REVIEW CONTACT:  Abril Andersen  Utilization   Network Utilization Review Department  Phone: [phone] x carefully listen to the prompts. All voicemails are confidential.  Email: [email or web address]     ADMISSION INFORMATION  PRESENTATION DATE: 10/24/2024 11:28 AM  OBERVATION ADMISSION DATE: N/A  INPATIENT ADMISSION DATE: 10/25/24  2:48 PM   DISCHARGE DATE: 10/27/2024 11:50 AM   DISPOSITION:Home/Self Care    Network Utilization Review Department  ATTENTION: Please call with any questions or concerns to [phone] and carefully listen to the prompts so that you are directed to the right person. All voicemails are confidential.   For Discharge needs, contact Care Management DC Support Team at [phone] opt. 2  Send all requests for admission clinical reviews, approved or denied determinations and any other requests to dedicated fax number below belonging to the campus where the patient is receiving treatment. List of dedicated fax numbers for the Facilities:  FACILITY NAME UR FAX NUMBER   ADMISSION DENIALS (Administrative/Medical Necessity) [phone]   DISCHARGE SUPPORT TEAM ([facility]) [phone]   PARENT CHILD HEALTH (Maternity/NICU/Pediatrics) [phone]   [facility] [phone]   [facility] [phone]   [facility] [phone]   [facility] [phone]   [facility] [phone]   [facility] [phone]   [facility] [phone]   [facility]  [phone]   [facility] [phone]   [facility] [phone]   [facility] [phone]   [facility] [phone]

## 2024-10-28 NOTE — TELEPHONE ENCOUNTER
Pt called and was discharged from hospital 10/27.  Calling for TCM and medication question.  Metoprolol Succinate.    Warm transferred her to Jen

## 2024-10-30 ENCOUNTER — RA CDI HCC (OUTPATIENT)
Dept: OTHER | Facility: HOSPITAL | Age: 74
End: 2024-10-30

## 2024-11-04 DIAGNOSIS — I63.511 ACUTE ISCHEMIC RIGHT MCA STROKE (HCC): ICD-10-CM

## 2024-11-05 RX ORDER — ATORVASTATIN CALCIUM 40 MG/1
40 TABLET, FILM COATED ORAL
Qty: 30 TABLET | Refills: 4 | Status: SHIPPED | OUTPATIENT
Start: 2024-11-05

## 2024-11-06 ENCOUNTER — OFFICE VISIT (OUTPATIENT)
Dept: FAMILY MEDICINE CLINIC | Facility: CLINIC | Age: 74
End: 2024-11-06
Payer: COMMERCIAL

## 2024-11-06 VITALS
SYSTOLIC BLOOD PRESSURE: 122 MMHG | WEIGHT: 140.8 LBS | DIASTOLIC BLOOD PRESSURE: 60 MMHG | BODY MASS INDEX: 27.64 KG/M2 | HEIGHT: 60 IN

## 2024-11-06 DIAGNOSIS — Z95.818 IMPLANTABLE LOOP RECORDER PRESENT: ICD-10-CM

## 2024-11-06 DIAGNOSIS — I48.92 ATRIAL FIBRILLATION AND FLUTTER (HCC): Primary | ICD-10-CM

## 2024-11-06 DIAGNOSIS — Z86.73 H/O ISCHEMIC RIGHT MCA STROKE: ICD-10-CM

## 2024-11-06 DIAGNOSIS — R73.03 PREDIABETES: ICD-10-CM

## 2024-11-06 DIAGNOSIS — I48.91 ATRIAL FIBRILLATION AND FLUTTER (HCC): Primary | ICD-10-CM

## 2024-11-06 DIAGNOSIS — E78.1 HYPERTRIGLYCERIDEMIA: ICD-10-CM

## 2024-11-06 PROCEDURE — 99495 TRANSJ CARE MGMT MOD F2F 14D: CPT | Performed by: FAMILY MEDICINE

## 2024-11-06 NOTE — ASSESSMENT & PLAN NOTE
At hospital reports and reconciled medication.  Does appear in sinus rhythm at this time.  Continue current regimen and follow-up

## 2024-11-06 NOTE — PROGRESS NOTES
Transition of Care Visit  Name: Radha Dodson      : 1950      MRN: [id number]  Encounter Provider: Ketan Mahmood MD  Encounter Date: 2024   Encounter department: [facility] PRIMARY [facility]ARE    Assessment & Plan  Atrial fibrillation and flutter (HCC)  At hospital reports and reconciled medication.  Does appear in sinus rhythm at this time.  Continue current regimen and follow-up       Prediabetes  Is watching the sweets and starch in diet       Hypertriglyceridemia  Apparently stable, continue current regimen       H/O ischemic right MCA stroke  Seems to be doing well, continue current regimen and follow-up       Medtronic loop recorder in situ  Currently being monitored for arrhythmia.  Patient does not notice symptoms related to this            History of Present Illness     Transitional Care Management Review:   Radha Dodson is a 74 y.o. female here for TCM follow up.     During the TCM phone call patient stated:  TCM Call       Type Value    Date and time call was made  10/28/2024  1:01 PM    Hospital care reviewed  Records reviewed    Patient was hospitialized at  [facility]    Date of Admission  10/24/24    Date of discharge  10/27/24    Diagnosis  Acute Kidney Injury    Disposition  Home    Were the patients medications reviewed and updated  Yes    Current Symptoms  Middle abdominal pain    Middle abdominal pain severity  Mild    Middle abdominal pain onset  Other (comment) (Comment)  Had a procedure done ablasion          TCM Call       Type Value    Post hospital issues  None    Should patient be enrolled in anticoag monitoring?  No    Scheduled for follow up?  Yes    Patients specialists  Cardiologist    Did you obtain your prescribed medications  Yes    Do you need help managing your prescriptions or medications  No    Is transportation to your appointment needed  No    I have advised the patient to call PCP with any new or worsening symptoms  Jen  Gallupfredi    Living Arrangements  Spouse or Significiant other    Support System  Spouse    The type of support provided  Emotional; Financial; Physical    Do you have social support  Yes, as much as I need    Are you recieving any outpatient services  No    Are you recieving home care services  No    Are you using any community resources  No    Current waiver services  No    Have you fallen in the last 12 months  No    Interperter language line needed  No    Counseling  Patient    Counseling topics  patient and family education          Patient had been feeling well and developed left-sided weakness and was hospitalized with stroke and the right middle cerebral artery.  This was embolic in nature and the clot was removed and has had complete resolution of symptoms.  Was found to have hydronephrosis in the hospital and also question about possible prediabetes.  Has followed up with urology and cardiology.  Did show problems with paroxysmal atrial fibrillation and is now on Xarelto and has been watching diet better.  Had developed atrial fibs flutter and was hospitalized although she did not particularly feel this.  Had ablation done which resulted back in sinus rhythm and is now on amlodipine.  Seen today for transition of care      Review of Systems   Constitutional:  Negative for chills and fever.   HENT:  Negative for congestion and trouble swallowing.    Eyes:  Negative for pain and visual disturbance.   Respiratory:  Negative for cough and shortness of breath.    Cardiovascular:  Negative for chest pain, palpitations and leg swelling.   Gastrointestinal:  Negative for abdominal pain, constipation, diarrhea and vomiting.   Endocrine: Positive for polyuria (Nocturia x 1-2).   Genitourinary:  Positive for enuresis.   Musculoskeletal:  Negative for back pain.   Skin:  Negative for color change and rash.   Neurological:  Negative for seizures and syncope.   Psychiatric/Behavioral:  Negative for sleep disturbance.     All other systems reviewed and are negative.    Objective     Blood Pressure 122/60 (BP Location: Left arm, Patient Position: Sitting, Cuff Size: Standard)   Height 5' (1.524 m)   Weight 63.9 kg (140 lb 12.8 oz)   Body Mass Index 27.50 kg/m²     Physical Exam  Vitals and nursing note reviewed.   Constitutional:       General: She is not in acute distress.     Appearance: Normal appearance. She is well-developed.   HENT:      Head: Normocephalic and atraumatic.      Nose: Nose normal.      Mouth/Throat:      Mouth: Mucous membranes are moist.   Eyes:      Conjunctiva/sclera: Conjunctivae normal.   Neck:      Vascular: No carotid bruit.   Cardiovascular:      Rate and Rhythm: Normal rate and regular rhythm.      Heart sounds: No murmur (Rate is 66 heart sounds are distinct) heard.  Pulmonary:      Effort: Pulmonary effort is normal. No respiratory distress.      Breath sounds: Normal breath sounds.   Abdominal:      General: Abdomen is flat.      Palpations: Abdomen is soft. There is no mass.      Tenderness: There is no abdominal tenderness.   Musculoskeletal:         General: No swelling.      Cervical back: Neck supple. No tenderness.      Right lower leg: No edema.      Left lower leg: No edema.   Lymphadenopathy:      Cervical: No cervical adenopathy.   Skin:     General: Skin is warm and dry.      Capillary Refill: Capillary refill takes less than 2 seconds.      Findings: No rash.   Neurological:      Mental Status: She is alert.      Motor: No weakness.      Coordination: Coordination normal.      Gait: Gait normal.      Deep Tendon Reflexes: Reflexes normal.   Psychiatric:         Mood and Affect: Mood normal.       Medications have been reviewed by provider in current encounter

## 2024-11-06 NOTE — TELEPHONE ENCOUNTER
Patient returned missed calls and is scheduled with Dr Fagan on 1/15 at 9:45 Am. Pt can only do AM appts due to work schedule.    Pt was provided tel number to [facility]. Pt stated that she had an appt scheduled with them but had to cancel it due to the office moving. Pt will attempt to reschedule.

## 2024-11-06 NOTE — ASSESSMENT & PLAN NOTE
Anesthesia ROS/Med Hx    Overall Review:    Pt. does not have difficult airway    Neuro/Psych Review:  Neuro/Psych Comments: dementia  Pt. positive for psychiatric history    Cardiovascular Review:    Pt. positive for hyperlipidemia    GI/HEPATIC/RENAL Review:    Pt. negative for GI/Hepatic/Renal ROS    End/Other Review:    Pt. negative for endo/other ROS      Anesthesia Plan     ASA Status: 3  Anesthesia Type: MAC  Reviewed: Pre-Induction Reassessment, DNR Status, Medications, NPO Status, Patient Summary, Problem List, Allergies and Past Med History  The proposed anesthetic plan, including its risks and benefits, have been discussed with the Patient - along with the risks and benefits of alternatives.  Questions were encouraged and answered and the patient and/or representative agrees to proceed.      Physical Exam  Mallampati: III  Cardio Rhythm: Regular  Cardio Rate: Normal  cardiovascular exam normal  Breath sounds clear to auscultation:  Yes  pulmonary exam normal  abdominal exam normal  Dental Note: Teeth in poor repair with multiple broken teeth                   Currently being monitored for arrhythmia.  Patient does not notice symptoms related to this

## 2024-11-06 NOTE — PATIENT INSTRUCTIONS
Overall seems to be doing very well after the ablation and certainly seems to be in sinus rhythm today.  Reviewed meds and should continue current dose and follow-up.  Patient has had flu shot

## 2024-11-11 ENCOUNTER — TELEPHONE (OUTPATIENT)
Age: 74
End: 2024-11-11

## 2024-11-11 ENCOUNTER — TELEPHONE (OUTPATIENT)
Dept: CARDIOLOGY CLINIC | Facility: CLINIC | Age: 74
End: 2024-11-11

## 2024-11-11 NOTE — LETTER
November 12, 2024    Radha Dodson  [address] [facility] [address]      Dear Dr. Golden,    Radha Dodson is a patient of our practice. She does not have cardiac restrictions to undergo a routine dental cleaning on 11/14/2024. No medication recommendations or need for prophylactic antibiotics.    If you have any questions or concerns, please don't hesitate to call.    Sincerely,         Cora BARILLAS

## 2024-11-11 NOTE — TELEPHONE ENCOUNTER
Received call from pt/  she is s/p ILR implant an afib/flutter ablation 10/24.  She has a dentist appointment this week on 11/14 for a routine cleaning.  Her dentist is requesting a clearance letter.      Her dentist is Daniel Golden DMD. Please fax it to [phone]

## 2024-11-12 ENCOUNTER — IN-CLINIC DEVICE VISIT (OUTPATIENT)
Dept: CARDIOLOGY CLINIC | Facility: CLINIC | Age: 74
End: 2024-11-12
Payer: COMMERCIAL

## 2024-11-12 DIAGNOSIS — I48.91 ATRIAL FIBRILLATION, UNSPECIFIED TYPE (HCC): Primary | ICD-10-CM

## 2024-11-12 PROCEDURE — 93291 INTERROG DEV EVAL SCRMS IP: CPT | Performed by: INTERNAL MEDICINE

## 2024-11-12 NOTE — PROGRESS NOTES
Results for orders placed or performed in visit on 11/12/24   Cardiac EP device report    Narrative    DEVICE INTERROGATED IN THE [facility] OFFICE: LOOP: WOUND CHECK: INCISION CLEAN AND DRY WITH EDGES APPROXIMATED; SUTURES REMOVED; WOUND CARE AND RESTRICTIONS REVIEWED WITH PATIENT. PICTURE IN MEDIA. BATTERY VOLTAGE OK. PRESENTING RHYTHM: NSR @ 56 BPM. R-WAVES: 0.48mV. NO PATIENT OR DEVICE ACTIVATED EPISODES. NO PROGRAMMING CHANGES MADE TO DEVICE PARAMETERS. NORMAL DEVICE FUNCTION. CH

## 2024-11-14 NOTE — TELEPHONE ENCOUNTER
Received call from Rafia at dentist office, dentist is asking if patient is cleared to receive dental fillings as well. Please advise.

## 2024-11-14 NOTE — TELEPHONE ENCOUNTER
Yes, ok for fillings. Just not able to hold anticoagulation right now, but otherwise may proceed with dental work.

## 2024-11-15 ENCOUNTER — HOSPITAL ENCOUNTER (OUTPATIENT)
Dept: RADIOLOGY | Facility: CLINIC | Age: 74
End: 2024-11-15
Payer: COMMERCIAL

## 2024-11-15 VITALS — WEIGHT: 140.4 LBS | BODY MASS INDEX: 29.47 KG/M2 | HEIGHT: 58 IN

## 2024-11-15 VITALS — WEIGHT: 147 LBS | HEIGHT: 60 IN | BODY MASS INDEX: 28.86 KG/M2

## 2024-11-15 DIAGNOSIS — Z78.0 ASYMPTOMATIC POSTMENOPAUSAL STATE: ICD-10-CM

## 2024-11-15 DIAGNOSIS — Z12.31 ENCOUNTER FOR SCREENING MAMMOGRAM FOR BREAST CANCER: ICD-10-CM

## 2024-11-15 PROCEDURE — 77063 BREAST TOMOSYNTHESIS BI: CPT

## 2024-11-15 PROCEDURE — 77080 DXA BONE DENSITY AXIAL: CPT

## 2024-11-15 PROCEDURE — 77067 SCR MAMMO BI INCL CAD: CPT

## 2024-11-19 ENCOUNTER — RESULTS FOLLOW-UP (OUTPATIENT)
Dept: FAMILY MEDICINE CLINIC | Facility: CLINIC | Age: 74
End: 2024-11-19

## 2024-11-19 NOTE — RESULT ENCOUNTER NOTE
Please call the patient regarding her abnormal result.  Is going to need follow-up for the left breast and should be contacted by nurse for this

## 2024-12-11 ENCOUNTER — OFFICE VISIT (OUTPATIENT)
Dept: CARDIOLOGY CLINIC | Facility: CLINIC | Age: 74
End: 2024-12-11
Payer: COMMERCIAL

## 2024-12-11 VITALS
SYSTOLIC BLOOD PRESSURE: 162 MMHG | HEIGHT: 58 IN | TEMPERATURE: 98 F | BODY MASS INDEX: 29.85 KG/M2 | HEART RATE: 61 BPM | OXYGEN SATURATION: 98 % | DIASTOLIC BLOOD PRESSURE: 70 MMHG | WEIGHT: 142.2 LBS

## 2024-12-11 DIAGNOSIS — Z86.73 HISTORY OF CARDIOEMBOLIC STROKE: ICD-10-CM

## 2024-12-11 DIAGNOSIS — E78.2 MIXED HYPERLIPIDEMIA: ICD-10-CM

## 2024-12-11 DIAGNOSIS — I48.19 PERSISTENT ATRIAL FIBRILLATION (HCC): Primary | ICD-10-CM

## 2024-12-11 PROCEDURE — 99214 OFFICE O/P EST MOD 30 MIN: CPT | Performed by: INTERNAL MEDICINE

## 2024-12-11 PROCEDURE — G2211 COMPLEX E/M VISIT ADD ON: HCPCS | Performed by: INTERNAL MEDICINE

## 2024-12-11 NOTE — PROGRESS NOTES
Electrophysiology Follow Up  [facility]  [facility] [address], [place] [address]      Name: Radha Dodson  : 1950  MRN: [id number]    Assessment & Plan  Persistent atrial fibrillation (HCC)  anticoagulated with Xarelto / [id number] score of 4 (age, sex, CVA)  EF of 40% per echo 10/2024 / severe dilation of left atrium noted  rate control: metoprolol succinate 25mg daily  antiarrhythmic therapy: amiodarone 100mg daily  prior cardioversion: failed CVx4  then successful with amio load   now status post ablation of atrial fibrillation with pulmonary vein isolation and posterior wall isolation using PFA along with CTI line by Dr. Lacy on 10/24/2024  Mixed hyperlipidemia  Maintained on statin  Medtronic loop recorder in situ  Implanted 10/24/2024 for further A-fib surveillance  History of cardioembolic stroke  Stroke 2024  Follow-up Zio monitor after this event showed A-fib, thus she was started on anticoagulation  Atrial fibrillation and flutter (HCC)         Discussion/Plan:    Patient recently underwent PFA afib/flutter ablation on 10/24/24 by Dr. Lacy    Since this procedure they have been maintained on Amiodarone, metoprolol, and xarelto as above      Latest 2024 lab eval + CXR showed TSH WNL, some mild LFT elevation, and pulmonary vascular congestion on CXR    Since this procedure She reports she has been feeling well and denies acute cardiac complaint    She affirms their groin access sites have healed well    She denies any significant adverse bleeding events on Xarelto    No EKG performed today /2 virtual visit    She checked her pulse during this visit which was 54, and reports her HR's have been in th 50-60's at rest post-ablation    24 loop interrogation negative for repeat afib symptom    Plan to discontinue amiodarone 25 (3 months post-ablation) and continue metoprolol + xarelto at current dosing w/ ongoing loop  monitoring moving forwards    Patient has been instructed to follow up in our EP office in 6 months or as needed. She will call our office with any questions or concerns in the meantime.    Rhythm History:   Atrial fibrillation:      Atrial flutter:      SVT:      VT/VF/PVC:     Device history:   Pacemaker:     Defibrillator:     BIV PPM:     BIV ICD:     ILR:    Interim History/HPI:   Interim history: Radha Dodson is a 74 y.o. female with a PMH of persistent afib, HLD, Medtronic loop in situ, and hx of stroke .    She presents today for routine outpatient follow up given a recent PFA afib/flutter ablation on 10/24/24. Since this procedure She reports she has been feeling well and denies acute cardiac complaint. She affirms their groin access sites have healed well.     EKG: None obtained 2/2 virtual visit    Review of Systems   Constitutional:  Negative for appetite change, chills, fatigue, fever and unexpected weight change.   Respiratory:  Negative for chest tightness and shortness of breath.    Cardiovascular:  Negative for chest pain, palpitations and leg swelling.   Neurological:  Negative for dizziness, syncope, weakness and light-headedness.         OBJECTIVE:   Vitals:   There were no vitals taken for this visit.  There is no height or weight on file to calculate BMI.        Physical Exam:   Physical Exam  Constitutional:       General: She is not in acute distress.     Appearance: Normal appearance. She is not ill-appearing or toxic-appearing.   HENT:      Head: Normocephalic and atraumatic.      Right Ear: External ear normal.      Left Ear: External ear normal.      Nose: Nose normal.   Pulmonary:      Effort: Pulmonary effort is normal.   Neurological:      Mental Status: She is alert.            Medications:      Current Outpatient Medications:     amiodarone 100 mg tablet, Take 1 tablet (100 mg total) by mouth daily with breakfast, Disp: 90 tablet, Rfl: 0    atorvastatin (LIPITOR) 40 mg tablet, TAKE 1  TABLET BY MOUTH EVERY DAY WITH DINNER, Disp: 30 tablet, Rfl: 4    metoprolol succinate (TOPROL-XL) 25 mg 24 hr tablet, Take 1 tablet (25 mg total) by mouth daily, Disp: 90 tablet, Rfl: 0    Multiple Vitamin (multivitamin) tablet, Take 1 tablet by mouth daily, Disp: , Rfl:     NON FORMULARY, in the morning Taking a Calcium Supplement, Disp: , Rfl:     rivaroxaban (Xarelto) 20 mg tablet, Take 1 tablet (20 mg total) by mouth daily with breakfast, Disp: 90 tablet, Rfl: 3       Historical Information   Past Medical History:   Diagnosis Date    High cholesterol     Stroke (HCC)        Past Surgical History:   Procedure Laterality Date    BREAST BIOPSY Left     benign - 20 years ago    CARDIAC ELECTROPHYSIOLOGY PROCEDURE N/A 10/24/2024    Procedure: Cardiac eps/afib ablation PFA;  Surgeon: Tyrone Lacy DO;  Location: [facility];  Service: Cardiology    CARDIAC ELECTROPHYSIOLOGY PROCEDURE N/A 10/24/2024    Procedure: Cardiac eps/aflutter ablation;  Surgeon: Tyrone Lacy DO;  Location: [facility];  Service: Cardiology    CARDIAC ELECTROPHYSIOLOGY PROCEDURE N/A 10/24/2024    Procedure: Cardiac loop recorder implant;  Surgeon: Tyrone Lacy DO;  Location: [facility];  Service: Cardiology    IR STROKE ALERT  05/15/2024       Social History     Substance and Sexual Activity   Alcohol Use Yes    Comment: Social     Social History     Substance and Sexual Activity   Drug Use Never     Social History     Tobacco Use   Smoking Status Never   Smokeless Tobacco Never       Family History   Problem Relation Age of Onset    Atrial fibrillation Mother     Lung cancer Father     No Known Problems Sister     No Known Problems Maternal Grandmother     No Known Problems Maternal Grandfather     No Known Problems Paternal Grandmother     No Known Problems Paternal Grandfather     Breast cancer Maternal Aunt         age- 60's    No Known Problems Maternal Aunt     No Known Problems Paternal Aunt     No Known  Problems Paternal Aunt     No Known Problems Paternal Aunt          Labs & Results:  Below is the patient's most recent value for Albumin, ALT, AST, BUN, Calcium, Chloride, Cholesterol, CO2, Creatinine, GFR, Glucose, HDL, Hematocrit, Hemoglobin, Hemoglobin A1C, LDL, Magnesium, Phosphorus, Platelets, Potassium, PSA, Sodium, Triglycerides, and WBC.   Lab Results   Component Value Date    ALT 44 09/28/2024    AST 33 09/28/2024    BUN 30 (H) 10/27/2024    CALCIUM 8.5 10/27/2024     10/27/2024    CO2 24 10/27/2024    CREATININE 1.20 10/27/2024    HDL 45 (L) 05/15/2024    HCT 32.5 (L) 10/27/2024    HGB 10.6 (L) 10/27/2024    HGBA1C 6.3 (H) 09/27/2024    MG 2.0 09/29/2024    PHOS 3.0 09/29/2024     10/27/2024    K 4.7 10/27/2024    TRIG 179 (H) 05/15/2024    WBC 11.74 (H) 10/27/2024     Note: for a comprehensive list of the patient's lab results, access the Results Review activity.

## 2024-12-11 NOTE — PROGRESS NOTES
[facility]  [address]  [address]  [facility] [id number] [phone]    Patient Name: Radha Dodson  YOB: 1950 ;female  MR No: [id number]        Diagnosis ICD-10-CM Associated Orders   1. Persistent atrial fibrillation (HCC)  I48.19 Ambulatory referral to Cardiac Electrophysiology      2. History of cardioembolic stroke  Z86.73       3. Mixed hyperlipidemia  E78.2            Assessment and recommendations:    1. Persistent atrial fibrillation (HCC)  Assessment & Plan:  Patient is status post successful atrial fibrillation ablation using PFA.  She remains in normal sinus rhythm.  She also has an implantable loop recorder and most recent check in November 2024 showed:  BATTERY VOLTAGE OK. PRESENTING RHYTHM: NSR @ 56 BPM. R-WAVES: 0.48mV. NO PATIENT OR DEVICE ACTIVATED EPISODES. NO PROGRAMMING CHANGES MADE TO DEVICE PARAMETERS. NORMAL DEVICE FUNCTION   I think is appropriate to discontinue her amiodarone now.  She does have a televisit coming up with the EP next week and she will discuss that with them and reach out to me with any questions or concerns.  Orders:  -     Ambulatory referral to Cardiac Electrophysiology  2. History of cardioembolic stroke  3. Mixed hyperlipidemia  Assessment & Plan:  Lipid panel 5/15/2024: C 180. T 179. H 45. L 99  She has since been started on atorvastatin 40 mg daily.  She is due for an updated lipid profile.     Echocardiogram from October 2024 showed mildly reduced LVEF due to rapid atrial fibrillation with biatrial enlargement and mild mitral regurgitation.  We will reimage with her next visit.      CHIEF COMPLAINT:          HPI:   74-year-old female with past medical history significant for mixed hyperlipidemia, paroxysmal atrial fibrillation leading to cardioembolic stroke in the right MCA territory, status post ablation of atrial fibrillation with pulmonary vein isolation and posterior wall isolation  using PFA along with CTI line by Dr. Lacy on 10/24/2024, presents for routine follow-up visit.  Patient is doing quite well and denies any palpitations, chest pain or syncope.  She also underwent a [employer] implantable loop recorder insertion.  73-year-old female with no significant past medical history who was recently hospitalized in May 2024 with an acute right MCA stroke with left sided hemiparesis and left facial droop.  Because of worsening of symptoms, she received TNK and mechanical thrombectomy with excellent recovery.  She was referred to cardiology as an outpatient to rule out atrial fibrillation as a possible cause of her recent stroke.       Past Medical History:   Diagnosis Date    High cholesterol     Stroke (HCC)           CURRENT  MEDICATIONS:      Current Outpatient Medications:     amiodarone 100 mg tablet, Take 1 tablet (100 mg total) by mouth daily with breakfast, Disp: 90 tablet, Rfl: 0    atorvastatin (LIPITOR) 40 mg tablet, TAKE 1 TABLET BY MOUTH EVERY DAY WITH DINNER, Disp: 30 tablet, Rfl: 4    metoprolol succinate (TOPROL-XL) 25 mg 24 hr tablet, Take 1 tablet (25 mg total) by mouth daily, Disp: 90 tablet, Rfl: 0    Multiple Vitamin (multivitamin) tablet, Take 1 tablet by mouth daily, Disp: , Rfl:     NON FORMULARY, in the morning Taking a Calcium Supplement, Disp: , Rfl:     rivaroxaban (Xarelto) 20 mg tablet, Take 1 tablet (20 mg total) by mouth daily with breakfast, Disp: 90 tablet, Rfl: 3    ALLERGIES  No Known Allergies    Lab Results   Component Value Date    LDLCALC 99 05/15/2024    HDL 45 (L) 05/15/2024    CHOLESTEROL 180 05/15/2024    TRIG 179 (H) 05/15/2024    CREATININE 1.20 10/27/2024    CREATININE 2.00 (H) 10/26/2024    K 4.7 10/27/2024    K 5.2 10/26/2024    SODIUM 138 10/27/2024    SODIUM 134 (L) 10/26/2024       REVIEW OF SYSTEMS   Positive for: Osteoarthritis  Negative for: All remaining as reviewed below and in HPI.    SYSTEM SYMPTOMS REVIEWED:  General--weight change,  "fever, night sweats  Respiratory--cough, wheezing, shortness of breath, sputum production  Cardiovascular--chest pain, syncope, dyspnea on exertion, edema, decline in exercise tolerance, claudication   Gastrointestinal--persistent vomiting, diarrhea, abdominal distention, blood in stool   Muscular or skeletal--joint pain or swelling   Neurologic--headaches, syncope, abnormal movement  Hematologic--history of easy bruising and bleeding   Endocrine--thyroid enlargement, heat or cold intolerance, polyuria   Psychiatric--anxiety, depression     General physical examination:    General appearance: Alert, no acute distress, appears stated age, normal weight.  HEENT: Mucous membranes are moist.  No obvious abnormality noted.  Neck: Supple with no lymphadenopathy.  No JVD.  Carotid pulses are intact.  No carotid bruit.  Cardiovascular system: Regular rhythm with occasional ectopy.  Normal S1 and S2.  No murmurs.  No rubs or gallops. Extremities: No edema. No cyanosis.  Pulmonary: Respirations unlabored.  Good air entry bilaterally.  Clear to auscultation bilaterally.  Gastrointestinal: Abdomen is soft and nontender.  Bowel sounds are positive.  Musculoskeletal: Upper Extremities: Normal upper motor strength. Lower Extremity: Normal motor strength. Gait: Normal.   Skin: Skin is warm. No rashes or lesions.  Neurological: Patient is alert and oriented with no gross motor deficits.  Psychiatric: Mood is normal.  Behavior is normal.    Vitals:    12/11/24 0819   BP: 162/70   Pulse: 61   Temp: 98 °F (36.7 °C)   SpO2: 98%      Body mass index is 29.98 kg/m².  Wt Readings from Last 3 Encounters:   12/11/24 64.5 kg (142 lb 3.2 oz)   11/15/24 63.7 kg (140 lb 6.4 oz)   11/15/24 66.7 kg (147 lb)             Sohan Hightower MD, FACC, JHONNY    Portions of the record  have been created with voice recognition software.  Occasional grammatical mistakes or wrong word or \"sound alike\" substitutions may have occurred due to the inherent " limitations of voice recognition software. Please reach out to me directly for any clarifications.

## 2024-12-11 NOTE — ASSESSMENT & PLAN NOTE
Stroke 05/2024  Follow-up Zio monitor after this event showed A-fib, thus she was started on anticoagulation

## 2024-12-11 NOTE — ASSESSMENT & PLAN NOTE
Patient is status post successful atrial fibrillation ablation using PFA.  She remains in normal sinus rhythm.  She also has an implantable loop recorder and most recent check in November 2024 showed:  BATTERY VOLTAGE OK. PRESENTING RHYTHM: NSR @ 56 BPM. R-WAVES: 0.48mV. NO PATIENT OR DEVICE ACTIVATED EPISODES. NO PROGRAMMING CHANGES MADE TO DEVICE PARAMETERS. NORMAL DEVICE FUNCTION   I think is appropriate to discontinue her amiodarone now.  She does have a televisit coming up with the EP next week and she will discuss that with them and reach out to me with any questions or concerns.

## 2024-12-11 NOTE — ASSESSMENT & PLAN NOTE
anticoagulated with Xarelto / [id number] score of 4 (age, sex, CVA)  EF of 40% per echo 10/2024 / severe dilation of left atrium noted  rate control: metoprolol succinate 25mg daily  antiarrhythmic therapy: amiodarone 100mg daily  prior cardioversion: failed CVx4 9/27 then successful with amio load 9/28  now status post ablation of atrial fibrillation with pulmonary vein isolation and posterior wall isolation using PFA along with CTI line by Dr. Lacy on 10/24/2024

## 2024-12-11 NOTE — ASSESSMENT & PLAN NOTE
Lipid panel 5/15/2024: C 180. T 179. H 45. L 99  She has since been started on atorvastatin 40 mg daily.  She is due for an updated lipid profile.

## 2024-12-17 ENCOUNTER — TELEMEDICINE (OUTPATIENT)
Dept: CARDIOLOGY CLINIC | Facility: CLINIC | Age: 74
End: 2024-12-17

## 2024-12-17 DIAGNOSIS — I48.92 ATRIAL FIBRILLATION AND FLUTTER (HCC): ICD-10-CM

## 2024-12-17 DIAGNOSIS — I48.91 ATRIAL FIBRILLATION AND FLUTTER (HCC): ICD-10-CM

## 2024-12-17 DIAGNOSIS — Z95.818 IMPLANTABLE LOOP RECORDER PRESENT: ICD-10-CM

## 2024-12-17 DIAGNOSIS — E78.2 MIXED HYPERLIPIDEMIA: ICD-10-CM

## 2024-12-17 DIAGNOSIS — I48.19 PERSISTENT ATRIAL FIBRILLATION (HCC): Primary | ICD-10-CM

## 2024-12-17 DIAGNOSIS — Z86.73 HISTORY OF CARDIOEMBOLIC STROKE: ICD-10-CM

## 2024-12-17 RX ORDER — AMIODARONE HYDROCHLORIDE 100 MG/1
100 TABLET ORAL
Qty: 60 TABLET | Refills: 0 | Status: SHIPPED | OUTPATIENT
Start: 2024-12-17 | End: 2024-12-17

## 2024-12-17 RX ORDER — AMIODARONE HYDROCHLORIDE 100 MG/1
100 TABLET ORAL
Qty: 30 TABLET | Refills: 0 | Status: SHIPPED | OUTPATIENT
Start: 2024-12-17 | End: 2025-03-17

## 2025-01-03 ENCOUNTER — TELEPHONE (OUTPATIENT)
Dept: UROLOGY | Facility: CLINIC | Age: 75
End: 2025-01-03

## 2025-01-03 NOTE — TELEPHONE ENCOUNTER
"Called patient to r/s 1/15/25 with Dr. Fagan. Patient stated\"she will have to call us back\" . Cancelled appt.   "

## 2025-01-13 ENCOUNTER — HOSPITAL ENCOUNTER (OUTPATIENT)
Dept: RADIOLOGY | Facility: CLINIC | Age: 75
Discharge: HOME | End: 2025-01-13
Payer: COMMERCIAL

## 2025-01-13 VITALS — WEIGHT: 142 LBS | BODY MASS INDEX: 29.81 KG/M2 | HEIGHT: 58 IN

## 2025-01-13 DIAGNOSIS — R92.8 ABNORMAL SCREENING MAMMOGRAM: ICD-10-CM

## 2025-01-13 PROCEDURE — 76642 ULTRASOUND BREAST LIMITED: CPT

## 2025-01-13 PROCEDURE — 77065 DX MAMMO INCL CAD UNI: CPT

## 2025-01-13 PROCEDURE — G0279 TOMOSYNTHESIS, MAMMO: HCPCS

## 2025-01-13 NOTE — PROGRESS NOTES
Call placed to patient regarding recommendation for;    _____ RIGHT ___X___LEFT      __X___Ultrasound guided  ______Stereotactic breast biopsy.    Pt states that procedure was explained to her, additional questions answered at this time    __X___Verbalized understanding.    Reviewed clip placement with patient, pt states understanding: Yes: __X__ No: ____  Comments:    Blood thinners: No: _____ Yes: __X___ What: XARELTO    Biopsy teaching sheet given:  _____yes __X__no (All teaching points discussed during call, pt with no questions at this time, pt adv to arrive at 0730 for 0800 biopsy)    Pt given name/# for any further questions/needs    Pt agreeable to a post procedure call and states we can give her biopsy results to her over the phone

## 2025-01-14 DIAGNOSIS — I48.92 ATRIAL FIBRILLATION AND FLUTTER (HCC): ICD-10-CM

## 2025-01-14 DIAGNOSIS — I48.91 ATRIAL FIBRILLATION AND FLUTTER (HCC): ICD-10-CM

## 2025-01-15 ENCOUNTER — RESULTS FOLLOW-UP (OUTPATIENT)
Dept: FAMILY MEDICINE CLINIC | Facility: CLINIC | Age: 75
End: 2025-01-15

## 2025-01-15 RX ORDER — METOPROLOL SUCCINATE 25 MG/1
25 TABLET, EXTENDED RELEASE ORAL DAILY
Qty: 90 TABLET | Refills: 1 | Status: SHIPPED | OUTPATIENT
Start: 2025-01-15 | End: 2025-04-15

## 2025-01-15 NOTE — RESULT ENCOUNTER NOTE
Please call the patient regarding her abnormal result.  An ultrasound-guided biopsy is recommended for the left breast.  She will be contacted by nurse to set this up

## 2025-01-15 NOTE — TELEPHONE ENCOUNTER
----- Message from Ketan Mahmood MD sent at 1/15/2025  1:06 PM EST -----  Please call the patient regarding her abnormal result.  An ultrasound-guided biopsy is recommended for the left breast.  She will be contacted by nurse to set this up

## 2025-02-05 ENCOUNTER — HOSPITAL ENCOUNTER (OUTPATIENT)
Dept: RADIOLOGY | Facility: CLINIC | Age: 75
Discharge: HOME | End: 2025-02-05
Payer: COMMERCIAL

## 2025-02-05 VITALS — BODY MASS INDEX: 29.81 KG/M2 | HEIGHT: 58 IN | WEIGHT: 142 LBS

## 2025-02-05 VITALS — SYSTOLIC BLOOD PRESSURE: 158 MMHG | HEART RATE: 56 BPM | DIASTOLIC BLOOD PRESSURE: 83 MMHG

## 2025-02-05 DIAGNOSIS — R92.8 ABNORMAL MAMMOGRAM: ICD-10-CM

## 2025-02-05 PROCEDURE — 88305 TISSUE EXAM BY PATHOLOGIST: CPT | Performed by: PATHOLOGY

## 2025-02-05 PROCEDURE — A4648 IMPLANTABLE TISSUE MARKER: HCPCS

## 2025-02-05 PROCEDURE — 19083 BX BREAST 1ST LESION US IMAG: CPT

## 2025-02-05 RX ORDER — LIDOCAINE HYDROCHLORIDE 10 MG/ML
5 INJECTION, SOLUTION EPIDURAL; INFILTRATION; INTRACAUDAL; PERINEURAL ONCE
Status: COMPLETED | OUTPATIENT
Start: 2025-02-05 | End: 2025-02-05

## 2025-02-05 RX ADMIN — LIDOCAINE HYDROCHLORIDE 5 ML: 10 INJECTION, SOLUTION EPIDURAL; INFILTRATION; INTRACAUDAL; PERINEURAL at 08:28

## 2025-02-05 NOTE — PROGRESS NOTES
Procedure type:    ___x__ultrasound guided _____stereotactic _____ MRI guided    Breast:    __x___Left _____Right    Location: Left breast 9:00 6cmfn    Needle: 12g Bharat    # of passes: 4    Clip: open coil    Performed by: Dr. Freeman    Pressure held for 5 minutes by: Tiffanie Arias Strips:    __x___yes _____no    Band aid:    __x___yes_____no    Tape and guaze:    _____yes __x___no    Tolerated procedure:    ___x__yes _____no

## 2025-02-05 NOTE — DISCHARGE INSTR - OTHER ORDERS
POST LARGE CORE BREAST BIOPSY PATIENT INFORMATION      Place an ice pack inside your bra over the top of the dressing every hour for 20 minutes (20 minutes on, 60 minutes off). Do this until bedtime.    Do not shower or bathe until the following morning.    You may bathe your breast carefully with the steri-strips in place.  Be careful    Not to loosen them.  The steri-strips will fall off in 3-5 days.    You may have mild discomfort, and you may have some bruising where the   Needle entered the skin.  This should clear within 5-7 days.    If you need medicine for discomfort, take acetaminophen products such as   Tylenol. You may also take Advil or Motrin products.    Do not participate in strenuous activities such as-tennis, aerobics, skiing,  Weight lifting, etc. for 24 hours. Refrain from swimming/soaking for 72 hours.    Wearing a bra for sleeping may be more comfortable for the first 24-48 hours.    Watch for continued bleeding, pain or fever over 101. If any of these symptoms occur, please contact our    breast nurse navigator at the location where your biopsy was performed.    During normal business hours (7:30 am-4:00 pm) please call the nurse navigator at the site where your   procedure was performed:    [facility]/[place]:  [phone], [phone] or [phone]  [facility]:     [phone] or [phone]  [facility]:    [phone] or [phone]  [facility]/[place]:   [phone] or [phone]  [facility]:   [phone]  [facility]:     [phone]              After 4 PM - please call your physician or go to the nearest Emergency Department location.            9.         The final results of your biopsy are usually available within one week.

## 2025-02-06 ENCOUNTER — RESULTS FOLLOW-UP (OUTPATIENT)
Dept: FAMILY MEDICINE CLINIC | Facility: CLINIC | Age: 75
End: 2025-02-06

## 2025-02-06 PROCEDURE — 88305 TISSUE EXAM BY PATHOLOGIST: CPT | Performed by: PATHOLOGY

## 2025-02-06 NOTE — TELEPHONE ENCOUNTER
----- Message from Ketan Mahmood MD sent at 2/6/2025  2:25 PM EST -----  Please call patient and inform of normal biopsy report which just shows fibrous tissue

## 2025-02-06 NOTE — PROGRESS NOTES
Post procedure call completed    Bleeding: _____yes __X___no (pt denies)    Pain: _____yes ___X___no (pt reports mild tenderness, has not needed any OTC medication)    Redness/Swelling: ______yes ___X___no (pt denies)    Band aid removed: _____yes ___X__no (discussed removing when she showers)    Steri-Strips intact: ___X___yes _____no (discussed with patient to remove steri strips on Monday if they have not come off on their own)    Pt with no questions at this time, adv will call when results available, adv to call with any questions or concerns, has name/# for contact

## 2025-02-10 ENCOUNTER — TELEPHONE (OUTPATIENT)
Dept: MAMMOGRAPHY | Facility: CLINIC | Age: 75
End: 2025-02-10

## 2025-02-11 ENCOUNTER — TELEPHONE (OUTPATIENT)
Dept: MAMMOGRAPHY | Facility: CLINIC | Age: 75
End: 2025-02-11

## 2025-02-21 ENCOUNTER — RA CDI HCC (OUTPATIENT)
Dept: OTHER | Facility: HOSPITAL | Age: 75
End: 2025-02-21

## 2025-02-28 ENCOUNTER — OFFICE VISIT (OUTPATIENT)
Dept: FAMILY MEDICINE CLINIC | Facility: CLINIC | Age: 75
End: 2025-02-28
Payer: COMMERCIAL

## 2025-02-28 VITALS
SYSTOLIC BLOOD PRESSURE: 130 MMHG | DIASTOLIC BLOOD PRESSURE: 72 MMHG | WEIGHT: 144.6 LBS | HEIGHT: 58 IN | BODY MASS INDEX: 30.35 KG/M2

## 2025-02-28 DIAGNOSIS — E78.2 MIXED HYPERLIPIDEMIA: Primary | ICD-10-CM

## 2025-02-28 DIAGNOSIS — N13.30 HYDROURETERONEPHROSIS: ICD-10-CM

## 2025-02-28 DIAGNOSIS — I48.91 ATRIAL FIBRILLATION AND FLUTTER (HCC): ICD-10-CM

## 2025-02-28 DIAGNOSIS — R73.03 PREDIABETES: ICD-10-CM

## 2025-02-28 DIAGNOSIS — I48.92 ATRIAL FIBRILLATION AND FLUTTER (HCC): ICD-10-CM

## 2025-02-28 PROCEDURE — 99214 OFFICE O/P EST MOD 30 MIN: CPT | Performed by: FAMILY MEDICINE

## 2025-02-28 PROCEDURE — G2211 COMPLEX E/M VISIT ADD ON: HCPCS | Performed by: FAMILY MEDICINE

## 2025-02-28 NOTE — PROGRESS NOTES
Name: Radha Dodson      : 1950      MRN: [id number]  Encounter Provider: Ketan Mahmood MD  Encounter Date: 2025   Encounter department: [facility]    Assessment & Plan  Mixed hyperlipidemia  Overall stable, continue current regimen       Atrial fibrillation and flutter (HCC)  Seems in sinus rhythm at this time.  Continue current meds and follow-up       Prediabetes  Watch sweets and starch in diet and push daily walking activity       Hydroureteronephrosis  Seems stable, does continue follow-up with urology            History of Present Illness     Patient has history of prediabetes, paroxysmal atrial fibrillation, CVA from embolus, hypercholesterolemia and hydronephrosis.  Overall has been feeling well.  Is on Xarelto and follows up with cardiology and urology      Review of Systems   Constitutional:  Negative for chills and fever.   HENT:  Negative for ear pain and sore throat.    Eyes:  Negative for pain and visual disturbance.   Respiratory:  Negative for cough and shortness of breath.    Cardiovascular:  Negative for chest pain and palpitations.   Gastrointestinal:  Negative for abdominal pain and vomiting.   Endocrine: Positive for polyuria.   Genitourinary:  Positive for dysuria.   Musculoskeletal:  Negative for arthralgias and back pain.   Skin:  Negative for color change and rash.   Neurological:  Negative for seizures and syncope.   All other systems reviewed and are negative.    Past Medical History:   Diagnosis Date    High cholesterol     Stroke (HCC)      Past Surgical History:   Procedure Laterality Date    BREAST BIOPSY Left     benign - 20 years ago    CARDIAC ELECTROPHYSIOLOGY PROCEDURE N/A 10/24/2024    Procedure: Cardiac eps/afib ablation PFA;  Surgeon: Tyrone Lacy DO;  Location: [facility];  Service: Cardiology    CARDIAC ELECTROPHYSIOLOGY PROCEDURE N/A 10/24/2024    Procedure: Cardiac eps/aflutter ablation;  Surgeon: Tyrone  DO Bambi;  Location: [facility];  Service: Cardiology    CARDIAC ELECTROPHYSIOLOGY PROCEDURE N/A 10/24/2024    Procedure: Cardiac loop recorder implant;  Surgeon: Tyrone Lacy DO;  Location: [facility];  Service: Cardiology    IR STROKE ALERT  05/15/2024    US GUIDED BREAST BIOPSY LEFT COMPLETE Left 2/5/2025     Family History   Problem Relation Age of Onset    Atrial fibrillation Mother     Lung cancer Father     No Known Problems Sister     No Known Problems Maternal Grandmother     No Known Problems Maternal Grandfather     No Known Problems Paternal Grandmother     No Known Problems Paternal Grandfather     Breast cancer Maternal Aunt         age- 60's    No Known Problems Maternal Aunt     No Known Problems Paternal Aunt     No Known Problems Paternal Aunt     No Known Problems Paternal Aunt      Social History     Tobacco Use    Smoking status: Never    Smokeless tobacco: Never   Vaping Use    Vaping status: Never Used   Substance and Sexual Activity    Alcohol use: Yes     Comment: Social    Drug use: Never    Sexual activity: Not Currently     Current Outpatient Medications on File Prior to Visit   Medication Sig    atorvastatin (LIPITOR) 40 mg tablet TAKE 1 TABLET BY MOUTH EVERY DAY WITH DINNER    metoprolol succinate (TOPROL-XL) 25 mg 24 hr tablet TAKE 1 TABLET (25 MG TOTAL) BY MOUTH DAILY.    Multiple Vitamin (multivitamin) tablet Take 1 tablet by mouth daily    NON FORMULARY in the morning Taking a Calcium Supplement    rivaroxaban (Xarelto) 20 mg tablet Take 1 tablet (20 mg total) by mouth daily with breakfast     No Known Allergies  Immunization History   Administered Date(s) Administered    COVID-19 PFIZER VACCINE 0.3 ML IM 03/29/2021, 04/19/2021, 11/16/2021    INFLUENZA 11/29/2006, 10/22/2014, 10/21/2015, 10/24/2016, 09/28/2017, 10/03/2018, 10/12/2021, 10/27/2022, 10/04/2023    Influenza Split High Dose Preservative Free IM 10/09/2024     Objective   Blood Pressure 130/72 (BP  "Location: Left arm, Patient Position: Sitting, Cuff Size: Standard)   Height 4' 9.75\" (1.467 m)   Weight 65.6 kg (144 lb 9.6 oz)   Body Mass Index 30.48 kg/m²     Physical Exam  Vitals and nursing note reviewed.   Constitutional:       General: She is not in acute distress.     Appearance: She is well-developed.   HENT:      Head: Normocephalic and atraumatic.   Eyes:      Conjunctiva/sclera: Conjunctivae normal.   Cardiovascular:      Rate and Rhythm: Normal rate and regular rhythm.      Heart sounds: No murmur heard.  Pulmonary:      Effort: Pulmonary effort is normal. No respiratory distress.      Breath sounds: Normal breath sounds.   Abdominal:      Palpations: Abdomen is soft.      Tenderness: There is no abdominal tenderness.   Musculoskeletal:         General: No swelling.      Cervical back: Neck supple.   Skin:     General: Skin is warm and dry.      Capillary Refill: Capillary refill takes less than 2 seconds.   Neurological:      Mental Status: She is alert.      Deep Tendon Reflexes: Reflexes normal.   Psychiatric:         Mood and Affect: Mood normal.         "

## 2025-02-28 NOTE — PATIENT INSTRUCTIONS
Overall seems to be doing very well.  Try to push daily walking activity and watch the starch in the diet.  Continue current meds and follow-up

## 2025-03-06 ENCOUNTER — REMOTE DEVICE CLINIC VISIT (OUTPATIENT)
Dept: CARDIOLOGY CLINIC | Facility: CLINIC | Age: 75
End: 2025-03-06
Payer: COMMERCIAL

## 2025-03-06 ENCOUNTER — RESULTS FOLLOW-UP (OUTPATIENT)
Dept: NON INVASIVE DIAGNOSTICS | Facility: HOSPITAL | Age: 75
End: 2025-03-06

## 2025-03-06 DIAGNOSIS — I48.19 PERSISTENT ATRIAL FIBRILLATION (HCC): Primary | ICD-10-CM

## 2025-03-06 PROCEDURE — 93298 REM INTERROG DEV EVAL SCRMS: CPT | Performed by: INTERNAL MEDICINE

## 2025-03-06 NOTE — PROGRESS NOTES
"[employer]EL[employer] TRANSMISSION: LOOP RECORDER. PRESENTING RHYTHM SB W/ PAC @ 50 BPM. BATTERY STATUS \"OK.\" NO PATIENT OR DEVICE ACTIVATED EPISODES. NORMAL DEVICE FUNCTION. DL   "

## 2025-04-27 DIAGNOSIS — I63.511 ACUTE ISCHEMIC RIGHT MCA STROKE (HCC): ICD-10-CM

## 2025-04-29 RX ORDER — ATORVASTATIN CALCIUM 40 MG/1
40 TABLET, FILM COATED ORAL
Qty: 30 TABLET | Refills: 0 | Status: SHIPPED | OUTPATIENT
Start: 2025-04-29

## 2025-04-29 NOTE — TELEPHONE ENCOUNTER
Patient needs an appointment. Please contact the patient to schedule an appointment. Last office visit:  6/17/24-return to the office in 6 months to see myself or Dr. Clark,courtesy provided

## 2025-05-23 NOTE — PROGRESS NOTES
Electrophysiology Follow Up  [facility]  [facility] [address], [place] [address]    Name: Radha Dodson  : 1950  MRN: [id number]    Assessment & Plan  Persistent atrial fibrillation (HCC)  prior cardioversion: failed CVx4  then successful with amio load   now status post ablation of atrial fibrillation with pulmonary vein isolation and posterior wall isolation using PFA along with CTI line by Dr. Lacy on 10/24/2024  ECHO (10/2024) - EF 40%  Current medication regimen:  AC: xarelto 20mg daily ([id number] 4 (age, sex, CVA hx))  AAD: None  Previously on amiodarone  Rate: metoprolol succinate 25mg daily   Mixed hyperlipidemia  Maintained on statin  Continue PCP f/u  Medtronic loop recorder in situ  Implanted 10/24/2024 for further A-fib surveillance   Continuing regular scheduled interrogations  History of cardioembolic stroke  Stroke 2024  Follow-up Zio monitor after this event showed A-fib, thus she was started on anticoagulation       Discussion/Plan:    Patient recently underwent PFA afib/flutter ablation on 10/24/24 by Dr. Lacy     In February her amiodarone was discontinued    Since then she's been maintained on xarelto and metoprolol as above    She reports that since her last EP visit she has been feeling well and currently denies acute cardiac complaint    EKG in office today showing sinus bradycardia w/ ventricular rate 47bpm    I interrogated her loop recorder today showing no noted afib episodes thusfar    No acute medication changes made at this time    To continue scheduled loop monitoring moving forwards    Modifiable risk factors for atrial fibrillation were discussed today including weight loss, avoiding alcohol/tobacco products, and treatment of RICKI/HTN/DM    Patient has been instructed to follow up in our EP office in 1 year or as needed. She will call our office with any questions or concerns in the meantime.    Rhythm  History:   Atrial fibrillation:      Atrial flutter:      SVT:      VT/VF/PVC:     Device history:   Pacemaker:     Defibrillator:     BIV PPM:     BIV ICD:     ILR:    Interim History/HPI:   Interim history: Radha Dodson is a 74 y.o. female with a PMH of afib/flutter, loop in situ, CVA hx, HLD and stroke hx.    She presents today for routine outpatient f/u given her history of afib. Since her last outpatient EP appointment she reports she has been feeling well and currently denies acute cardiac complaint.  She denies any significant adverse bleeding events whilst on Xarelto.    EKG: inus bradycardia w/ ventricular rate 47bpm    Review of Systems   Constitutional:  Negative for appetite change, chills, fatigue, fever and unexpected weight change.   Respiratory:  Negative for chest tightness and shortness of breath.    Cardiovascular:  Negative for chest pain, palpitations and leg swelling.   Neurological:  Negative for dizziness, syncope, weakness and light-headedness.         OBJECTIVE:   Vitals:   There were no vitals taken for this visit.  There is no height or weight on file to calculate BMI.        Physical Exam:   Physical Exam  Constitutional:       General: She is not in acute distress.     Appearance: Normal appearance. She is not ill-appearing.   HENT:      Head: Normocephalic and atraumatic.      Nose: Nose normal.     Eyes:      General:         Right eye: No discharge.         Left eye: No discharge.     Neck:      Vascular: No carotid bruit.     Cardiovascular:      Rate and Rhythm: Normal rate and regular rhythm.      Pulses: Normal pulses.      Heart sounds: Normal heart sounds.   Pulmonary:      Effort: Pulmonary effort is normal.      Breath sounds: Normal breath sounds.     Musculoskeletal:      Right lower leg: No edema.      Left lower leg: No edema.     Skin:     General: Skin is warm and dry.      Capillary Refill: Capillary refill takes less than 2 seconds.     Neurological:      Mental  Status: She is alert.            Medications:    Current Medications[1]       Historical Information   Past Medical History[2]    Past Surgical History[3]    Social History     Substance and Sexual Activity   Alcohol Use Yes    Comment: Social     Social History     Substance and Sexual Activity   Drug Use Never     Tobacco Use History[4]    Family History[5]      Labs & Results:  Below is the patient's most recent value for Albumin, ALT, AST, BUN, Calcium, Chloride, Cholesterol, CO2, Creatinine, GFR, Glucose, HDL, Hematocrit, Hemoglobin, Hemoglobin A1C, LDL, Magnesium, Phosphorus, Platelets, Potassium, PSA, Sodium, Triglycerides, and WBC.   Lab Results   Component Value Date    ALT 44 09/28/2024    AST 33 09/28/2024    BUN 30 (H) 10/27/2024    CALCIUM 8.5 10/27/2024     10/27/2024    CO2 24 10/27/2024    CREATININE 1.20 10/27/2024    HDL 45 (L) 05/15/2024    HCT 32.5 (L) 10/27/2024    HGB 10.6 (L) 10/27/2024    HGBA1C 6.3 (H) 09/27/2024    MG 2.0 09/29/2024    PHOS 3.0 09/29/2024     10/27/2024    K 4.7 10/27/2024    TRIG 179 (H) 05/15/2024    WBC 11.74 (H) 10/27/2024     Note: for a comprehensive list of the patient's lab results, access the Results Review activity.         [1]   Current Outpatient Medications:     atorvastatin (LIPITOR) 40 mg tablet, TAKE 1 TABLET BY MOUTH EVERY DAY WITH DINNER, Disp: 30 tablet, Rfl: 0    metoprolol succinate (TOPROL-XL) 25 mg 24 hr tablet, TAKE 1 TABLET (25 MG TOTAL) BY MOUTH DAILY., Disp: 90 tablet, Rfl: 1    Multiple Vitamin (multivitamin) tablet, Take 1 tablet by mouth daily, Disp: , Rfl:     NON FORMULARY, in the morning Taking a Calcium Supplement, Disp: , Rfl:     rivaroxaban (Xarelto) 20 mg tablet, Take 1 tablet (20 mg total) by mouth daily with breakfast, Disp: 90 tablet, Rfl: 3  [2]   Past Medical History:  Diagnosis Date    High cholesterol     Stroke (HCC)    [3]   Past Surgical History:  Procedure Laterality Date    BREAST BIOPSY Left     benign - 20  years ago    CARDIAC ELECTROPHYSIOLOGY PROCEDURE N/A 10/24/2024    Procedure: Cardiac eps/afib ablation PFA;  Surgeon: Tyrone Lacy DO;  Location: [facility];  Service: Cardiology    CARDIAC ELECTROPHYSIOLOGY PROCEDURE N/A 10/24/2024    Procedure: Cardiac eps/aflutter ablation;  Surgeon: Tyrone Lacy DO;  Location: [facility];  Service: Cardiology    CARDIAC ELECTROPHYSIOLOGY PROCEDURE N/A 10/24/2024    Procedure: Cardiac loop recorder implant;  Surgeon: Tyrone Lacy DO;  Location: [facility];  Service: Cardiology    IR STROKE ALERT  05/15/2024    US GUIDED BREAST BIOPSY LEFT COMPLETE Left 2/5/2025   [4]   Social History  Tobacco Use   Smoking Status Never   Smokeless Tobacco Never   [5]   Family History  Problem Relation Name Age of Onset    Atrial fibrillation Mother      Lung cancer Father      No Known Problems Sister      No Known Problems Maternal Grandmother      No Known Problems Maternal Grandfather      No Known Problems Paternal Grandmother      No Known Problems Paternal Grandfather      Breast cancer Maternal Aunt          age- 60's    No Known Problems Maternal Aunt      No Known Problems Paternal Aunt      No Known Problems Paternal Aunt      No Known Problems Paternal Aunt

## 2025-05-23 NOTE — ASSESSMENT & PLAN NOTE
prior cardioversion: failed CVx4 9/27 then successful with amio load 9/28  now status post ablation of atrial fibrillation with pulmonary vein isolation and posterior wall isolation using PFA along with CTI line by Dr. Lacy on 10/24/2024  ECHO (10/2024) - EF 40%  Current medication regimen:  AC: xarelto 20mg daily ([id number] 4 (age, sex, CVA hx))  AAD: None  Previously on amiodarone  Rate: metoprolol succinate 25mg daily

## 2025-05-26 DIAGNOSIS — I63.511 ACUTE ISCHEMIC RIGHT MCA STROKE (HCC): ICD-10-CM

## 2025-05-28 RX ORDER — ATORVASTATIN CALCIUM 40 MG/1
40 TABLET, FILM COATED ORAL
Qty: 30 TABLET | Refills: 0 | Status: SHIPPED | OUTPATIENT
Start: 2025-05-28

## 2025-05-30 NOTE — TELEPHONE ENCOUNTER
Patient called to schedule a follow up   No answer left a message to the Voicemail to please call us to schedule a follow up       Provided our contact information for call back

## 2025-06-02 ENCOUNTER — OFFICE VISIT (OUTPATIENT)
Dept: CARDIOLOGY CLINIC | Facility: CLINIC | Age: 75
End: 2025-06-02

## 2025-06-02 VITALS
BODY MASS INDEX: 31.07 KG/M2 | SYSTOLIC BLOOD PRESSURE: 140 MMHG | WEIGHT: 148 LBS | DIASTOLIC BLOOD PRESSURE: 72 MMHG | HEART RATE: 47 BPM | HEIGHT: 58 IN

## 2025-06-02 DIAGNOSIS — Z86.73 HISTORY OF CARDIOEMBOLIC STROKE: ICD-10-CM

## 2025-06-02 DIAGNOSIS — I48.19 PERSISTENT ATRIAL FIBRILLATION (HCC): Primary | ICD-10-CM

## 2025-06-02 DIAGNOSIS — E78.2 MIXED HYPERLIPIDEMIA: ICD-10-CM

## 2025-06-02 DIAGNOSIS — Z95.818 IMPLANTABLE LOOP RECORDER PRESENT: ICD-10-CM

## 2025-06-02 LAB
ATRIAL RATE: 47 BPM
P AXIS: 71 DEGREES
PR INTERVAL: 130 MS
QRS AXIS: 9 DEGREES
QRSD INTERVAL: 86 MS
QT INTERVAL: 514 MS
QTC INTERVAL: 455 MS
T WAVE AXIS: -88 DEGREES
VENTRICULAR RATE: 47 BPM

## 2025-06-05 ENCOUNTER — REMOTE DEVICE CLINIC VISIT (OUTPATIENT)
Dept: CARDIOLOGY CLINIC | Facility: CLINIC | Age: 75
End: 2025-06-05
Payer: COMMERCIAL

## 2025-06-05 ENCOUNTER — RESULTS FOLLOW-UP (OUTPATIENT)
Dept: NON INVASIVE DIAGNOSTICS | Facility: HOSPITAL | Age: 75
End: 2025-06-05

## 2025-06-05 DIAGNOSIS — I48.91 ATRIAL FIBRILLATION AND FLUTTER (HCC): Primary | ICD-10-CM

## 2025-06-05 DIAGNOSIS — I48.92 ATRIAL FIBRILLATION AND FLUTTER (HCC): Primary | ICD-10-CM

## 2025-06-05 PROCEDURE — 93298 REM INTERROG DEV EVAL SCRMS: CPT | Performed by: STUDENT IN AN ORGANIZED HEALTH CARE EDUCATION/TRAINING PROGRAM

## 2025-06-05 NOTE — PROGRESS NOTES
"CARELINK TRANSMISSION: LOOP RECORDER. PRESENTING RHYTHM SB @ 43 BPM. BATTERY STATUS \"OK.\" NO PATIENT OR DEVICE ACTIVATED EPISODES. NORMAL DEVICE FUNCTION. DL   "

## 2025-06-11 ENCOUNTER — OFFICE VISIT (OUTPATIENT)
Dept: CARDIOLOGY CLINIC | Facility: CLINIC | Age: 75
End: 2025-06-11
Payer: COMMERCIAL

## 2025-06-11 ENCOUNTER — APPOINTMENT (OUTPATIENT)
Dept: LAB | Facility: HOSPITAL | Age: 75
End: 2025-06-11
Payer: COMMERCIAL

## 2025-06-11 VITALS
WEIGHT: 145 LBS | TEMPERATURE: 97.2 F | DIASTOLIC BLOOD PRESSURE: 62 MMHG | BODY MASS INDEX: 29.23 KG/M2 | SYSTOLIC BLOOD PRESSURE: 136 MMHG | HEIGHT: 59 IN | OXYGEN SATURATION: 100 % | HEART RATE: 45 BPM

## 2025-06-11 DIAGNOSIS — E78.5 HYPERLIPIDEMIA: ICD-10-CM

## 2025-06-11 DIAGNOSIS — I48.19 PERSISTENT ATRIAL FIBRILLATION (HCC): ICD-10-CM

## 2025-06-11 DIAGNOSIS — Z86.73 HISTORY OF CARDIOEMBOLIC STROKE: ICD-10-CM

## 2025-06-11 DIAGNOSIS — E78.2 MIXED HYPERLIPIDEMIA: ICD-10-CM

## 2025-06-11 DIAGNOSIS — I51.9 ASYMPTOMATIC LEFT VENTRICULAR SYSTOLIC DYSFUNCTION (LVSD): ICD-10-CM

## 2025-06-11 DIAGNOSIS — I48.19 PERSISTENT ATRIAL FIBRILLATION (HCC): Primary | ICD-10-CM

## 2025-06-11 LAB
ANION GAP SERPL CALCULATED.3IONS-SCNC: 4 MMOL/L (ref 4–13)
BUN SERPL-MCNC: 22 MG/DL (ref 5–25)
CALCIUM SERPL-MCNC: 9.3 MG/DL (ref 8.4–10.2)
CHLORIDE SERPL-SCNC: 104 MMOL/L (ref 96–108)
CHOLEST SERPL-MCNC: 142 MG/DL (ref ?–200)
CO2 SERPL-SCNC: 31 MMOL/L (ref 21–32)
CREAT SERPL-MCNC: 1.08 MG/DL (ref 0.6–1.3)
ERYTHROCYTE [DISTWIDTH] IN BLOOD BY AUTOMATED COUNT: 12.4 % (ref 11.6–15.1)
GFR SERPL CREATININE-BSD FRML MDRD: 51 ML/MIN/1.73SQ M
GLUCOSE P FAST SERPL-MCNC: 100 MG/DL (ref 65–99)
HCT VFR BLD AUTO: 41.2 % (ref 34.8–46.1)
HDLC SERPL-MCNC: 56 MG/DL
HGB BLD-MCNC: 13.7 G/DL (ref 11.5–15.4)
LDLC SERPL CALC-MCNC: 70 MG/DL (ref 0–100)
MCH RBC QN AUTO: 30 PG (ref 26.8–34.3)
MCHC RBC AUTO-ENTMCNC: 33.3 G/DL (ref 31.4–37.4)
MCV RBC AUTO: 90 FL (ref 82–98)
PLATELET # BLD AUTO: 266 THOUSANDS/UL (ref 149–390)
PMV BLD AUTO: 10.7 FL (ref 8.9–12.7)
POTASSIUM SERPL-SCNC: 4.3 MMOL/L (ref 3.5–5.3)
RBC # BLD AUTO: 4.56 MILLION/UL (ref 3.81–5.12)
SODIUM SERPL-SCNC: 139 MMOL/L (ref 135–147)
TRIGL SERPL-MCNC: 80 MG/DL (ref ?–150)
WBC # BLD AUTO: 4.13 THOUSAND/UL (ref 4.31–10.16)

## 2025-06-11 PROCEDURE — 80061 LIPID PANEL: CPT

## 2025-06-11 PROCEDURE — 99214 OFFICE O/P EST MOD 30 MIN: CPT | Performed by: INTERNAL MEDICINE

## 2025-06-11 PROCEDURE — 80048 BASIC METABOLIC PNL TOTAL CA: CPT

## 2025-06-11 PROCEDURE — G2211 COMPLEX E/M VISIT ADD ON: HCPCS | Performed by: INTERNAL MEDICINE

## 2025-06-11 PROCEDURE — 85027 COMPLETE CBC AUTOMATED: CPT

## 2025-06-11 PROCEDURE — 36415 COLL VENOUS BLD VENIPUNCTURE: CPT

## 2025-06-11 RX ORDER — ASCORBATE CALCIUM 500 MG
500 TABLET ORAL DAILY
COMMUNITY

## 2025-06-11 NOTE — PROGRESS NOTES
"[facility] [place] [id number]  [address]  [facility] [id number] [phone]    Patient Name: Radha Dodson  YOB: 1950 ;female  MR No: [id number]        Diagnosis ICD-10-CM Associated Orders   1. Persistent atrial fibrillation (HCC)  I48.19 CBC and Platelet     Basic metabolic panel     Echo complete w/ contrast if indicated      2. Asymptomatic left ventricular systolic dysfunction (LVSD)  I51.9       3. History of cardioembolic stroke  Z86.73       4. Mixed hyperlipidemia  E78.2            Assessment and recommendations:    1. Persistent atrial fibrillation (HCC)  -     CBC and Platelet; Future; Expected date: Collect anytime  -     Basic metabolic panel; Future; Expected date: Collect anytime  -     Echo complete w/ contrast if indicated; Future; Expected date: 06/11/2025  2. Asymptomatic left ventricular systolic dysfunction (LVSD)  3. History of cardioembolic stroke  4. Mixed hyperlipidemia     Patient remains in normal sinus rhythm with no bleeding issues with Xarelto.  Most recent device check in June 2025: LOOP RECORDER. PRESENTING RHYTHM SB @ 43 BPM. BATTERY STATUS \"OK.\" NO PATIENT OR DEVICE ACTIVATED EPISODES. NORMAL DEVICE FUNCTION.  Echocardiogram from October 2024 showed moderate concentric left ventricular hypertrophy mildly reduced left ventricular systolic function of 35 to 40%, with severely dilated left atrium, mild mitral regurgitation and mild tricuspid regurgitation.  Will reimage later this year for LV function assessment.  She does have mild asymptomatic bradycardia.  I have advised the patient to keep an eye on her heart rate and if it drops below 45 beats a minute during the daytime, to discontinue the metoprolol.  Her CBC from October 2024 showed mild anemia?.  Check latest labs.    CHIEF COMPLAINT:      Atrial fibrillation, mixed hyperlipidemia    HPI:   74-year-old female with past medical history significant for " mixed hyperlipidemia, paroxysmal atrial fibrillation leading to cardioembolic stroke in the right MCA territory in May 2024 , status post ablation of atrial fibrillation with pulmonary vein isolation and posterior wall isolation using PFA along with CTI line by Dr. Lacy on 10/24/2024, presents for routine follow-up visit.  Patient is doing quite well and denies any palpitations, chest pain or syncope.  She also underwent a Medtronic implantable loop recorder insertion.  Review of record reveals that patient was hospitalized in May 2024    with acute right MCA stroke with left-sided hemiparesis and left facial droop.  She received TNK and mechanical thrombectomy with excellent recovery.                                      Past Medical History[1]       CURRENT  MEDICATIONS:    Current Medications[2]    ALLERGIES  No Known Allergies    Lab Results   Component Value Date    LDLCALC 99 05/15/2024    HDL 45 (L) 05/15/2024    CHOLESTEROL 180 05/15/2024    TRIG 179 (H) 05/15/2024    CREATININE 1.20 10/27/2024    CREATININE 2.00 (H) 10/26/2024    K 4.7 10/27/2024    K 5.2 10/26/2024    SODIUM 138 10/27/2024    SODIUM 134 (L) 10/26/2024         REVIEW OF SYSTEMS   Positive for: Osteoarthritis  Negative for: All remaining as reviewed below and in HPI.    SYSTEM SYMPTOMS REVIEWED:  General--weight change, fever, night sweats  Respiratory--cough, wheezing, shortness of breath, sputum production  Cardiovascular--chest pain, syncope, dyspnea on exertion, edema, decline in exercise tolerance, claudication   Gastrointestinal--persistent vomiting, diarrhea, abdominal distention, blood in stool   Muscular or skeletal--joint pain or swelling   Neurologic--headaches, syncope, abnormal movement  Hematologic--history of easy bruising and bleeding   Endocrine--thyroid enlargement, heat or cold intolerance, polyuria   Psychiatric--anxiety, depression     General physical examination:    General appearance: Alert, no acute distress,  "appears stated age.  Mildly overweight  HEENT: Mucous membranes are moist.  No obvious abnormality noted.  Neck: Supple with no lymphadenopathy.  No JVD.  Carotid pulses are intact.  No carotid bruit.  Cardiovascular system: Regular rhythm.  Normal S1 and S2.  No murmurs.  No rubs or gallops. Extremities: No edema. No cyanosis.  Pulmonary: Respirations unlabored.  Good air entry bilaterally.  Clear to auscultation bilaterally.  Gastrointestinal: Abdomen is soft and nontender.  Bowel sounds are positive.  Musculoskeletal: Upper Extremities: Normal upper motor strength. Lower Extremity: Normal motor strength. Gait: Normal.   Skin: Skin is warm. No rashes or lesions.  Neurological: Patient is alert and oriented with no gross motor deficits.  Psychiatric: Mood is normal.  Behavior is normal.    Vitals:    06/11/25 0755   BP: 136/62   Pulse: (!) 45   Temp: (!) 97.2 °F (36.2 °C)   SpO2: 100%      Body mass index is 29.29 kg/m².  Wt Readings from Last 3 Encounters:   06/11/25 65.8 kg (145 lb)   06/02/25 67.1 kg (148 lb)   02/28/25 65.6 kg (144 lb 9.6 oz)             Sohan Hightower MD, [facility], [facility]    Portions of the record  have been created with voice recognition software.  Occasional grammatical mistakes or wrong word or \"sound alike\" substitutions may have occurred due to the inherent limitations of voice recognition software. Please reach out to me directly for any clarifications.          [1]   Past Medical History:  Diagnosis Date    High cholesterol     Stroke (HCC)    [2]   Current Outpatient Medications:     atorvastatin (LIPITOR) 40 mg tablet, TAKE 1 TABLET BY MOUTH EVERY DAY WITH DINNER, Disp: 30 tablet, Rfl: 0    Calcium Ascorbate (VITAMIN C) 500 mg tablet, Take 500 mg by mouth daily, Disp: , Rfl:     metoprolol succinate (TOPROL-XL) 25 mg 24 hr tablet, TAKE 1 TABLET (25 MG TOTAL) BY MOUTH DAILY., Disp: 90 tablet, Rfl: 1    Multiple Vitamin (multivitamin) tablet, Take 1 tablet by mouth in the morning., Disp: , Rfl: "     rivaroxaban (Xarelto) 20 mg tablet, Take 1 tablet (20 mg total) by mouth daily with breakfast, Disp: 90 tablet, Rfl: 3

## 2025-06-30 ENCOUNTER — TELEPHONE (OUTPATIENT)
Dept: NEUROLOGY | Facility: CLINIC | Age: 75
End: 2025-06-30

## 2025-06-30 DIAGNOSIS — I63.511 ACUTE ISCHEMIC RIGHT MCA STROKE (HCC): ICD-10-CM

## 2025-06-30 RX ORDER — ATORVASTATIN CALCIUM 40 MG/1
40 TABLET, FILM COATED ORAL
Qty: 30 TABLET | Refills: 0 | Status: SHIPPED | OUTPATIENT
Start: 2025-06-30

## 2025-07-02 DIAGNOSIS — I48.0 PAROXYSMAL ATRIAL FIBRILLATION (HCC): ICD-10-CM

## 2025-07-02 DIAGNOSIS — I63.511 ACUTE ISCHEMIC RIGHT MCA STROKE (HCC): ICD-10-CM

## 2025-07-02 RX ORDER — RIVAROXABAN 20 MG/1
20 TABLET, FILM COATED ORAL
Qty: 90 TABLET | Refills: 0 | Status: SHIPPED | OUTPATIENT
Start: 2025-07-02

## 2025-07-03 RX ORDER — ATORVASTATIN CALCIUM 40 MG/1
40 TABLET, FILM COATED ORAL
Qty: 30 TABLET | Refills: 0 | OUTPATIENT
Start: 2025-07-03

## 2025-08-06 DIAGNOSIS — I63.511 ACUTE ISCHEMIC RIGHT MCA STROKE (HCC): ICD-10-CM

## 2025-08-07 RX ORDER — ATORVASTATIN CALCIUM 40 MG/1
40 TABLET, FILM COATED ORAL
Qty: 30 TABLET | Refills: 0 | OUTPATIENT
Start: 2025-08-07

## 2025-08-13 ENCOUNTER — TELEPHONE (OUTPATIENT)
Dept: NEUROLOGY | Facility: CLINIC | Age: 75
End: 2025-08-13

## (undated) DEVICE — NEEDLE TRANSSEPTAL BRK-1 71CM

## (undated) DEVICE — CATH EP ADVISOR HD GRID MAPPING 8F

## (undated) DEVICE — CABLE CATH CONNECTION FARASTAR

## (undated) DEVICE — GUIDE SHEATH SLO 8.5 FR

## (undated) DEVICE — AMPLATZ EXTRA STIFF WIRE GUIDE: Brand: AMPLATZ

## (undated) DEVICE — PINNACLE INTRODUCER SHEATH: Brand: PINNACLE

## (undated) DEVICE — CATH EP  INQUIRY 6F 2-5-2MM  LRG CRV STERABLE DECAPOLAR 110CM

## (undated) DEVICE — CATH ULTRASOUND ACUNAV ICE 8FR 90CM GE VIVID-I

## (undated) DEVICE — TUBING SET COOL POINT

## (undated) DEVICE — CATH ABLATION FLEXABILITY SE 8FR BI-DIR F-J

## (undated) DEVICE — REF PATCH ENSITE X

## (undated) DEVICE — SHEATH STEERABLE FARADRIVE

## (undated) DEVICE — CATH ABLATION FARAWAVE PULSED FIELD 31MM